# Patient Record
Sex: FEMALE | Race: WHITE | NOT HISPANIC OR LATINO | Employment: UNEMPLOYED | URBAN - METROPOLITAN AREA
[De-identification: names, ages, dates, MRNs, and addresses within clinical notes are randomized per-mention and may not be internally consistent; named-entity substitution may affect disease eponyms.]

---

## 2017-01-01 ENCOUNTER — APPOINTMENT (OUTPATIENT)
Dept: RADIOLOGY | Facility: MEDICAL CENTER | Age: 52
End: 2017-01-01
Attending: EMERGENCY MEDICINE
Payer: COMMERCIAL

## 2017-01-01 ENCOUNTER — HOSPITAL ENCOUNTER (EMERGENCY)
Facility: MEDICAL CENTER | Age: 52
End: 2017-01-01
Attending: EMERGENCY MEDICINE
Payer: COMMERCIAL

## 2017-01-01 VITALS
HEIGHT: 64 IN | HEART RATE: 101 BPM | RESPIRATION RATE: 18 BRPM | TEMPERATURE: 97.3 F | DIASTOLIC BLOOD PRESSURE: 104 MMHG | WEIGHT: 215.39 LBS | SYSTOLIC BLOOD PRESSURE: 141 MMHG | OXYGEN SATURATION: 96 % | BODY MASS INDEX: 36.77 KG/M2

## 2017-01-01 DIAGNOSIS — K52.9 GASTROENTERITIS, ACUTE: ICD-10-CM

## 2017-01-01 LAB
ALBUMIN SERPL BCP-MCNC: 3.7 G/DL (ref 3.2–4.9)
ALBUMIN/GLOB SERPL: 1.1 G/DL
ALP SERPL-CCNC: 265 U/L (ref 30–99)
ALT SERPL-CCNC: 23 U/L (ref 2–50)
ANION GAP SERPL CALC-SCNC: 15 MMOL/L (ref 0–11.9)
AST SERPL-CCNC: 14 U/L (ref 12–45)
BASOPHILS # BLD AUTO: 0.3 % (ref 0–1.8)
BASOPHILS # BLD: 0.04 K/UL (ref 0–0.12)
BILIRUB SERPL-MCNC: 0.5 MG/DL (ref 0.1–1.5)
BUN SERPL-MCNC: 22 MG/DL (ref 8–22)
CALCIUM SERPL-MCNC: 9.6 MG/DL (ref 8.5–10.5)
CHLORIDE SERPL-SCNC: 101 MMOL/L (ref 96–112)
CO2 SERPL-SCNC: 18 MMOL/L (ref 20–33)
CREAT SERPL-MCNC: 0.95 MG/DL (ref 0.5–1.4)
EOSINOPHIL # BLD AUTO: 0.07 K/UL (ref 0–0.51)
EOSINOPHIL NFR BLD: 0.5 % (ref 0–6.9)
ERYTHROCYTE [DISTWIDTH] IN BLOOD BY AUTOMATED COUNT: 43 FL (ref 35.9–50)
GFR SERPL CREATININE-BSD FRML MDRD: >60 ML/MIN/1.73 M 2
GLOBULIN SER CALC-MCNC: 3.3 G/DL (ref 1.9–3.5)
GLUCOSE SERPL-MCNC: 127 MG/DL (ref 65–99)
HCT VFR BLD AUTO: 41.2 % (ref 37–47)
HGB BLD-MCNC: 13.4 G/DL (ref 12–16)
IMM GRANULOCYTES # BLD AUTO: 0.06 K/UL (ref 0–0.11)
IMM GRANULOCYTES NFR BLD AUTO: 0.5 % (ref 0–0.9)
LACTATE BLD-SCNC: 1.5 MMOL/L (ref 0.5–2)
LACTATE BLD-SCNC: 3 MMOL/L (ref 0.5–2)
LYMPHOCYTES # BLD AUTO: 1.48 K/UL (ref 1–4.8)
LYMPHOCYTES NFR BLD: 11.1 % (ref 22–41)
MCH RBC QN AUTO: 28.8 PG (ref 27–33)
MCHC RBC AUTO-ENTMCNC: 32.5 G/DL (ref 33.6–35)
MCV RBC AUTO: 88.6 FL (ref 81.4–97.8)
MONOCYTES # BLD AUTO: 0.63 K/UL (ref 0–0.85)
MONOCYTES NFR BLD AUTO: 4.7 % (ref 0–13.4)
NEUTROPHILS # BLD AUTO: 11.03 K/UL (ref 2–7.15)
NEUTROPHILS NFR BLD: 82.9 % (ref 44–72)
NRBC # BLD AUTO: 0 K/UL
NRBC BLD AUTO-RTO: 0 /100 WBC
PLATELET # BLD AUTO: 568 K/UL (ref 164–446)
PMV BLD AUTO: 9 FL (ref 9–12.9)
POTASSIUM SERPL-SCNC: 4.1 MMOL/L (ref 3.6–5.5)
PROT SERPL-MCNC: 7 G/DL (ref 6–8.2)
RBC # BLD AUTO: 4.65 M/UL (ref 4.2–5.4)
SODIUM SERPL-SCNC: 134 MMOL/L (ref 135–145)
WBC # BLD AUTO: 13.3 K/UL (ref 4.8–10.8)

## 2017-01-01 PROCEDURE — 71010 DX-CHEST-PORTABLE (1 VIEW): CPT

## 2017-01-01 PROCEDURE — 99285 EMERGENCY DEPT VISIT HI MDM: CPT

## 2017-01-01 PROCEDURE — 96361 HYDRATE IV INFUSION ADD-ON: CPT

## 2017-01-01 PROCEDURE — 96374 THER/PROPH/DIAG INJ IV PUSH: CPT

## 2017-01-01 PROCEDURE — 80053 COMPREHEN METABOLIC PANEL: CPT

## 2017-01-01 PROCEDURE — 85025 COMPLETE CBC W/AUTO DIFF WBC: CPT

## 2017-01-01 PROCEDURE — 700111 HCHG RX REV CODE 636 W/ 250 OVERRIDE (IP): Performed by: EMERGENCY MEDICINE

## 2017-01-01 PROCEDURE — 83605 ASSAY OF LACTIC ACID: CPT | Mod: 91

## 2017-01-01 PROCEDURE — 96376 TX/PRO/DX INJ SAME DRUG ADON: CPT

## 2017-01-01 PROCEDURE — 74177 CT ABD & PELVIS W/CONTRAST: CPT

## 2017-01-01 PROCEDURE — 96375 TX/PRO/DX INJ NEW DRUG ADDON: CPT

## 2017-01-01 PROCEDURE — 87040 BLOOD CULTURE FOR BACTERIA: CPT

## 2017-01-01 RX ORDER — MORPHINE SULFATE 4 MG/ML
4 INJECTION, SOLUTION INTRAMUSCULAR; INTRAVENOUS ONCE
Status: COMPLETED | OUTPATIENT
Start: 2017-01-01 | End: 2017-01-01

## 2017-01-01 RX ORDER — SODIUM CHLORIDE 9 MG/ML
30 INJECTION, SOLUTION INTRAVENOUS
Status: COMPLETED | OUTPATIENT
Start: 2017-01-01 | End: 2017-01-01

## 2017-01-01 RX ORDER — DICYCLOMINE HCL 20 MG
20 TABLET ORAL EVERY 6 HOURS
Qty: 20 TAB | Refills: 0 | Status: SHIPPED | OUTPATIENT
Start: 2017-01-01 | End: 2017-07-28

## 2017-01-01 RX ORDER — ONDANSETRON 2 MG/ML
4 INJECTION INTRAMUSCULAR; INTRAVENOUS ONCE
Status: COMPLETED | OUTPATIENT
Start: 2017-01-01 | End: 2017-01-01

## 2017-01-01 RX ORDER — DIPHENOXYLATE HYDROCHLORIDE AND ATROPINE SULFATE 2.5; .025 MG/1; MG/1
1 TABLET ORAL 4 TIMES DAILY PRN
Qty: 30 TAB | Refills: 0 | Status: SHIPPED | OUTPATIENT
Start: 2017-01-01 | End: 2017-07-28

## 2017-01-01 RX ORDER — ONDANSETRON 4 MG/1
4 TABLET, ORALLY DISINTEGRATING ORAL EVERY 8 HOURS PRN
Qty: 15 TAB | Refills: 0 | Status: SHIPPED | OUTPATIENT
Start: 2017-01-01 | End: 2017-07-28

## 2017-01-01 RX ADMIN — MORPHINE SULFATE 4 MG: 4 INJECTION INTRAVENOUS at 13:47

## 2017-01-01 RX ADMIN — MORPHINE SULFATE 4 MG: 4 INJECTION INTRAVENOUS at 15:14

## 2017-01-01 RX ADMIN — SODIUM CHLORIDE 2931 ML: 9 INJECTION, SOLUTION INTRAVENOUS at 13:43

## 2017-01-01 RX ADMIN — ONDANSETRON 4 MG: 2 INJECTION, SOLUTION INTRAMUSCULAR; INTRAVENOUS at 13:47

## 2017-01-01 ASSESSMENT — LIFESTYLE VARIABLES: DO YOU DRINK ALCOHOL: NO

## 2017-01-01 ASSESSMENT — PAIN SCALES - GENERAL: PAINLEVEL_OUTOF10: 6

## 2017-01-01 NOTE — ED AVS SNAPSHOT
1/1/2017          Luann Morales  [address]  [facility] [address]    Dear Luann:    [facility] wants to ensure your discharge home is safe and you or your loved ones have had all your questions answered regarding your care after you leave the hospital.    You may receive a telephone call within two days of your discharge.  This call is to make certain you understand your discharge instructions as well as ensure we provided you with the best care possible during your stay with us.     The call will only last approximately 3-5 minutes and will be done by a nurse.    Once again, we want to ensure your discharge home is safe and that you have a clear understanding of any next steps in your care.  If you have any questions or concerns, please do not hesitate to contact us, we are here for you.  Thank you for choosing [facility] for your healthcare needs.    Sincerely,    Sergio Pinon    [facility]

## 2017-01-01 NOTE — ED PROVIDER NOTES
ED Provider Note    Scribed for Elio Mix M.D. by Pushpa Merritt. 1/1/2017  1:27 PM    Primary care provider: Lashon Ponce D.O.  Means of arrival: Walk-in  History obtained from: Patient  History limited by: None    CHIEF COMPLAINT  Chief Complaint   Patient presents with   • Nausea/Vomiting/Diarrhea       HPI  Luann Morales is a 51 y.o. female who presents to the Emergency Department with nausea, vomiting, diarrhea, and onset yesterday. The patient is 2 weeks status post breath reduction surgery and reports of diffuse abdominal pain since then. She began developing nausea, vomiting, and copious amounts of diarrhea yesterday with last emesis at 6:30 AM this morning with continued dry-heaves. She denies any hematochezia or shortness of breath. The patient states she has been around her daughter in law who is also sick with nausea and vomiting, but not as severe. She has past history of abdominal surgery cholecystectomy. She has an extensive list of allergies to medications. The patient does report recent antibiotic use and finished them on [place] Day.     REVIEW OF SYSTEMS  Pertinent negatives include no hematochezia or shortness of breath. As above, all other systems reviewed and are negative.   See HPI for further details.     PAST MEDICAL HISTORY   has a past medical history of Indigestion; Unspecified disorder of thyroid (1999); Heart burn; Arthritis; Cancer (HCC) (1990's); Essential hypertension; Hypothyroidism; Large breasts; and Perimenopausal.    SURGICAL HISTORY   has past surgical history that includes lumbar fusion posterior (9/2001); thyroidectomy total (2000); brody by laparoscopy (2005); wrist exploration (1998); primary c section (1987, 1995); egd w/endoscopic ultrasound (11/24/2009); hysteroscopy with video operative (4/11/2014); dilation and curettage (4/11/2014); and breast reconstruction.    SOCIAL HISTORY  Social History   Substance Use Topics   • Smoking status: Former Smoker -- 0.50  "packs/day for 5 years     Types: Cigarettes     Quit date: 01/01/1996   • Smokeless tobacco: Never Used      Comment: 1995   • Alcohol Use: Yes      Comment: 2 drink per week      History   Drug Use No       FAMILY HISTORY  Family History   Problem Relation Age of Onset   • Hypertension Mother    • Heart Attack Father        CURRENT MEDICATIONS  Home Medications     Reviewed by Humera Moss R.N. (Registered Nurse) on 01/01/17 at 1307  Med List Status: Complete    Medication Last Dose Status    albuterol 108 (90 BASE) MCG/ACT Aero Soln inhalation aerosol  Active    Cholecalciferol (VITAMIN D3) 2000 UNITS Tab  Active    levothyroxine (SYNTHROID) 125 MCG TABS  Active    lisinopril (PRINIVIL, ZESTRIL) 40 MG tablet  Active    omeprazole (PRILOSEC) 20 MG CPDR  Active                ALLERGIES  Allergies   Allergen Reactions   • Bactrim [Sulfamethoxazole W-Trimethoprim]    • Diclofenac Hives and Itching   • Doxycycline Hives   • Hydrocodone Hives   • Sulfamethizole Hives   • Tetracycline Hives   • Tramadol Hives   • Zpack [Azithromycin] Hives       PHYSICAL EXAM  VITAL SIGNS: /104 mmHg  Pulse 125  Temp(Src) 36.3 °C (97.3 °F) (Temporal)  Resp 24  Ht 1.626 m (5' 4\")  Wt 97.7 kg (215 lb 6.2 oz)  BMI 36.95 kg/m2  SpO2 100%  LMP 03/30/2014    Constitutional: Well developed, Well nourished, No acute distress, Non-toxic appearance.   HENT: Normocephalic, Atraumatic, Bilateral external ears normal, Oropharynx is clear mucous membranes are moist. No oral exudates or nasal discharge.   Eyes: Pupils are equal round and reactive, EOMI, Conjunctiva normal, No discharge.   Neck: Normal range of motion, No tenderness, Supple, No stridor. No meningismus.  Lymphatic: No lymphadenopathy noted.   Cardiovascular: Tachycardic, Regular rhythm without murmur rub or gallop.  Thorax & Lungs: Clear breath sounds bilaterally without wheezes, rhonchi or rales. There is no chest wall tenderness.   Abdomen: Diffuse 4 quadrant " tenderness, Soft non-distended. There is no rebound or guarding. No organomegaly is appreciated. Bowel sounds are normal.  Skin: Normal without rash.   Back: No CVA or spinal tenderness.   Extremities: Intact distal pulses, No edema, No tenderness, No cyanosis, No clubbing. Capillary refill is less than 2 seconds.  Musculoskeletal: Good range of motion in all major joints. No tenderness to palpation or major deformities noted.   Neurologic: Alert & oriented x 3, Normal motor function, Normal sensory function, No focal deficits noted. Reflexes are normal.  Psychiatric: Affect normal, Judgment normal, Mood normal. There is no suicidal ideation or patient reported hallucinations.     DIAGNOSTIC STUDIES / PROCEDURES    LABS  Labs Reviewed   LACTIC ACID - Abnormal; Notable for the following:     Lactic Acid 3.0 (*)     All other components within normal limits   CBC WITH DIFFERENTIAL - Abnormal; Notable for the following:     WBC 13.3 (*)     MCHC 32.5 (*)     Platelet Count 568 (*)     Neutrophils-Polys 82.90 (*)     Lymphocytes 11.10 (*)     Neutrophils (Absolute) 11.03 (*)     All other components within normal limits   COMP METABOLIC PANEL - Abnormal; Notable for the following:     Sodium 134 (*)     Co2 18 (*)     Anion Gap 15.0 (*)     Glucose 127 (*)     Alkaline Phosphatase 265 (*)     All other components within normal limits   LACTIC ACID   ESTIMATED GFR   URINALYSIS   URINE CULTURE(NEW)   BLOOD CULTURE   BLOOD CULTURE   All labs reviewed by me.    RADIOLOGY  CT-ABDOMEN-PELVIS WITH   Final Result      1.  There is bowel wall thickening with edema involving a large portion of the small bowel with normal appearing terminal ileum and duodenum. These findings may represent gastroenteritis. There is no bowel obstruction.   2.  There is no free air.   3.  There is a small amount of free fluid in the abdomen and in the pelvis.   4.  Surgically absent gallbladder without biliary dilatation.   5.  Left renal cortical  cyst.   6.  Cyst in the right lobe of the liver.   7.  There is postoperative change involving the right breast and axilla.      DX-CHEST-PORTABLE (1 VIEW)   Final Result      1.  There is no acute cardiopulmonary process.      The radiologist's interpretation of all radiological studies have been reviewed by me.    COURSE & MEDICAL DECISION MAKING  Nursing notes, VS, PMSFHx reviewed in chart.    1:27 PM Patient seen and examined at bedside. Ordered for DX-chest and labs to evaluate. Patient was treated with normal saline 2.931L infusion, morphine 4mg IV and Zofran 4mg IM for her symptoms.     2:57 PM Reviewed the patient's lab and imaging results, which are noted above. The patient was quite dehydrated and laboratory evaluation reveals a bicarb low at 18 with an anion gap of 15 and an initial lactate of 3.0. She was given significant fluid administration    3:02 PM Patient was reevaluated at bedside. The patient states her abdominal pain has not improved, but she has not experienced any emesis since arrival. Upon reassessment, the patient's physical exam is the same as before. Discussed lab and radiology results with the patient and informed them of the results, which are noted above. The patient does report recent antibiotic use and finished them on [place] Day. The patient will be medicated with another dose of morphine. Ordered CT-abdomen,pelvis with.     This shows bowel wall thickening with edema involving a large portion of the small bowel which is likely gastroenteritis. No evidence of free air, significant fluid collection, surgical process. The patient's white blood cell counts elevated at 13,300 with 83% neutrophil shift and she has significant elevation of platelet count at 568 but no evidence of clot    4:07 PM Reviewed CT result, which is noted above. I evaluated the patient at bedside and discussed the CT results with her. She is feeling better after her 2nd dose of morphine. If the patient is able  to tolerate PO challenge, then she is stable to be discharged home with Bentyl 20mg PO, Zofran 4mg ODT, and Limotil 2.5-0.025mg PO.    I have signed into and reviewed the patient's prescription monitoring program data prior to prescribing a scheduled drug. The patient will return for new or worsening symptoms and is stable at the time of discharge.    DISPOSITION:  Patient will be discharged home in stable condition.    FINAL IMPRESSION  1. Gastroenteritis, acute       IPushpa (Bonifacioibesperanza), am scribing for, and in the presence of, Elio Mix M.D..    Electronically signed by: Pushpa Merritt (Mira), 1/1/2017    Elio RUSSELL M.D. personally performed the services described in this documentation, as scribed by Pushpa Merritt in my presence, and it is both accurate and complete.    The note accurately reflects work and decisions made by me.  Elio Mix  1/1/2017  4:10 PM

## 2017-01-01 NOTE — ED NOTES
Pt reports abdominal pain. Pt is 2 weeks s/p breast reduction surgery. Pt reports n/v/d x 48 hours. Pt appears to be in mild distress. Sepsis score 3. Charge RN aware.

## 2017-01-01 NOTE — ED NOTES
Pt c/o midline abdominal pain, n/v/d x 2 days. Pt states pain in abdomen is intermittent, sharp. Pt states she is unable to tolerate anything PO. Pt had breast reduction surgery on Dec. 19, 2016. Incision CDI, no evidence of infection noted. Pt denies fevers, urinary symptoms. States he daughter in law was sick with same symptoms recently but not as bad. A/o x4, speaking in full sentences.

## 2017-01-01 NOTE — ED AVS SNAPSHOT
Revee Access Code: [email or web address]  Expires: 1/27/2017  9:03 AM    Revee  A secure, online tool to manage your health information     [employer]’s Revee® is a secure, online tool that connects you to your personalized health information from the privacy of your home -- day or night - making it very easy for you to manage your healthcare. Once the activation process is completed, you can even access your medical information using the Revee elena, which is available for free in the Apple Elena store or Google Play store.     Revee provides the following levels of access (as shown below):   My Chart Features   [facility] Primary Care Doctor [facility]  Specialists [facility]  Urgent  Care Non-[facility]  Primary Care  Doctor   Email your healthcare team securely and privately 24/7 X X X X   Manage appointments: schedule your next appointment; view details of past/upcoming appointments X      Request prescription refills. X      View recent personal medical records, including lab and immunizations X X X X   View health record, including health history, allergies, medications X X X X   Read reports about your outpatient visits, procedures, consult and ER notes X X X X   See your discharge summary, which is a recap of your hospital and/or ER visit that includes your diagnosis, lab results, and care plan. X X       How to register for Revee:  1. Go to  [email or web address].  2. Click on the Sign Up Now box, which takes you to the New Member Sign Up page. You will need to provide the following information:  a. Enter your Revee Access Code exactly as it appears at the top of this page. (You will not need to use this code after you’ve completed the sign-up process. If you do not sign up before the expiration date, you must request a new code.)   b. Enter your date of birth.   c. Enter your home email address.   d. Click Submit, and follow the next screen’s instructions.  3. Create a Revee ID. This will be your Revee  login ID and cannot be changed, so think of one that is secure and easy to remember.  4. Create a [employer] password. You can change your password at any time.  5. Enter your Password Reset Question and Answer. This can be used at a later time if you forget your password.   6. Enter your e-mail address. This allows you to receive e-mail notifications when new information is available in No Chains.  7. Click Sign Up. You can now view your health information.    For assistance activating your [employer] Chains account, call [phone]

## 2017-01-02 NOTE — DISCHARGE INSTRUCTIONS
Viral Gastroenteritis  Viral gastroenteritis is also known as stomach flu. This condition affects the stomach and intestinal tract. It can cause sudden diarrhea and vomiting. The illness typically lasts 3 to 8 days. Most people develop an immune response that eventually gets rid of the virus. While this natural response develops, the virus can make you quite ill.  CAUSES   Many different viruses can cause gastroenteritis, such as rotavirus or noroviruses. You can catch one of these viruses by consuming contaminated food or water. You may also catch a virus by sharing utensils or other personal items with an infected person or by touching a contaminated surface.  SYMPTOMS   The most common symptoms are diarrhea and vomiting. These problems can cause a severe loss of body fluids (dehydration) and a body salt (electrolyte) imbalance. Other symptoms may include:  · Fever.  · Headache.  · Fatigue.  · Abdominal pain.  DIAGNOSIS   Your caregiver can usually diagnose viral gastroenteritis based on your symptoms and a physical exam. A stool sample may also be taken to test for the presence of viruses or other infections.  TREATMENT   This illness typically goes away on its own. Treatments are aimed at rehydration. The most serious cases of viral gastroenteritis involve vomiting so severely that you are not able to keep fluids down. In these cases, fluids must be given through an intravenous line (IV).  HOME CARE INSTRUCTIONS   · Drink enough fluids to keep your urine clear or pale yellow. Drink small amounts of fluids frequently and increase the amounts as tolerated.  · Ask your caregiver for specific rehydration instructions.  · Avoid:  ¨ Foods high in sugar.  ¨ Alcohol.  ¨ Carbonated drinks.  ¨ Tobacco.  ¨ Juice.  ¨ Caffeine drinks.  ¨ Extremely hot or cold fluids.  ¨ Fatty, greasy foods.  ¨ Too much intake of anything at one time.  ¨ Dairy products until 24 to 48 hours after diarrhea stops.  · You may consume probiotics.  Probiotics are active cultures of beneficial bacteria. They may lessen the amount and number of diarrheal stools in adults. Probiotics can be found in yogurt with active cultures and in supplements.  · Wash your hands well to avoid spreading the virus.  · Only take over-the-counter or prescription medicines for pain, discomfort, or fever as directed by your caregiver. Do not give aspirin to children. Antidiarrheal medicines are not recommended.  · Ask your caregiver if you should continue to take your regular prescribed and over-the-counter medicines.  · Keep all follow-up appointments as directed by your caregiver.  SEEK IMMEDIATE MEDICAL CARE IF:   · You are unable to keep fluids down.  · You do not urinate at least once every 6 to 8 hours.  · You develop shortness of breath.  · You notice blood in your stool or vomit. This may look like coffee grounds.  · You have abdominal pain that increases or is concentrated in one small area (localized).  · You have persistent vomiting or diarrhea.  · You have a fever.  · The patient is a child younger than 3 months, and he or she has a fever.  · The patient is a child older than 3 months, and he or she has a fever and persistent symptoms.  · The patient is a child older than 3 months, and he or she has a fever and symptoms suddenly get worse.  · The patient is a baby, and he or she has no tears when crying.  MAKE SURE YOU:   · Understand these instructions.  · Will watch your condition.  · Will get help right away if you are not doing well or get worse.     This information is not intended to replace advice given to you by your health care provider. Make sure you discuss any questions you have with your health care provider.     Document Released: 12/18/2006 Document Revised: 03/11/2013 Document Reviewed: 10/03/2012  [employer] Interactive Patient Education ©2016 [employer]

## 2017-01-02 NOTE — ED NOTES
MD at bedside prior to d/c. Pt given d/c instruction. 3 rx's given. Pt to lobby by wheelchair. Pt took all belongings from room.

## 2017-01-06 LAB
BACTERIA BLD CULT: NORMAL
BACTERIA BLD CULT: NORMAL
SIGNIFICANT IND 70042: NORMAL
SIGNIFICANT IND 70042: NORMAL
SITE SITE: NORMAL
SITE SITE: NORMAL
SOURCE SOURCE: NORMAL
SOURCE SOURCE: NORMAL

## 2017-07-05 ENCOUNTER — HOSPITAL ENCOUNTER (OUTPATIENT)
Dept: RADIOLOGY | Facility: MEDICAL CENTER | Age: 52
End: 2017-07-05
Attending: SURGERY
Payer: COMMERCIAL

## 2017-07-05 DIAGNOSIS — M25.532 LEFT WRIST PAIN: ICD-10-CM

## 2017-07-05 DIAGNOSIS — S63.592D OTHER SPECIFIED SPRAIN OF LEFT WRIST, SUBSEQUENT ENCOUNTER: ICD-10-CM

## 2017-07-05 PROCEDURE — 73221 MRI JOINT UPR EXTREM W/O DYE: CPT | Mod: LT

## 2017-07-28 DIAGNOSIS — Z01.812 PRE-OPERATIVE LABORATORY EXAMINATION: ICD-10-CM

## 2017-07-28 DIAGNOSIS — Z01.810 PRE-OPERATIVE CARDIOVASCULAR EXAMINATION: ICD-10-CM

## 2017-07-28 LAB
ANION GAP SERPL CALC-SCNC: 7 MMOL/L (ref 0–11.9)
BUN SERPL-MCNC: 15 MG/DL (ref 8–22)
CALCIUM SERPL-MCNC: 9.4 MG/DL (ref 8.5–10.5)
CHLORIDE SERPL-SCNC: 104 MMOL/L (ref 96–112)
CO2 SERPL-SCNC: 24 MMOL/L (ref 20–33)
CREAT SERPL-MCNC: 1.01 MG/DL (ref 0.5–1.4)
EKG IMPRESSION: NORMAL
GFR SERPL CREATININE-BSD FRML MDRD: 57 ML/MIN/1.73 M 2
GLUCOSE SERPL-MCNC: 78 MG/DL (ref 65–99)
POTASSIUM SERPL-SCNC: 4 MMOL/L (ref 3.6–5.5)
SODIUM SERPL-SCNC: 135 MMOL/L (ref 135–145)

## 2017-07-28 PROCEDURE — 93005 ELECTROCARDIOGRAM TRACING: CPT

## 2017-07-28 PROCEDURE — 93010 ELECTROCARDIOGRAM REPORT: CPT | Performed by: INTERNAL MEDICINE

## 2017-07-28 PROCEDURE — 80048 BASIC METABOLIC PNL TOTAL CA: CPT

## 2017-07-28 PROCEDURE — 36415 COLL VENOUS BLD VENIPUNCTURE: CPT

## 2017-08-02 ENCOUNTER — HOSPITAL ENCOUNTER (OUTPATIENT)
Facility: MEDICAL CENTER | Age: 52
End: 2017-08-02
Attending: SURGERY | Admitting: SURGERY
Payer: COMMERCIAL

## 2017-08-02 VITALS
BODY MASS INDEX: 40.8 KG/M2 | RESPIRATION RATE: 17 BRPM | TEMPERATURE: 97.2 F | OXYGEN SATURATION: 90 % | HEART RATE: 72 BPM | HEIGHT: 64 IN | WEIGHT: 238.98 LBS

## 2017-08-02 PROBLEM — M25.532 LEFT WRIST PAIN: Status: ACTIVE | Noted: 2017-08-02

## 2017-08-02 PROBLEM — S63.599A: Status: ACTIVE | Noted: 2017-08-02

## 2017-08-02 PROCEDURE — 500312 HCHG CONNECTOR, BLAKE DRAIN 1-WAY: Performed by: SURGERY

## 2017-08-02 PROCEDURE — 160047 HCHG PACU  - EA ADDL 30 MINS PHASE II: Performed by: SURGERY

## 2017-08-02 PROCEDURE — 160041 HCHG SURGERY MINUTES - EA ADDL 1 MIN LEVEL 4: Performed by: SURGERY

## 2017-08-02 PROCEDURE — 160046 HCHG PACU - 1ST 60 MINS PHASE II: Performed by: SURGERY

## 2017-08-02 PROCEDURE — 160025 RECOVERY II MINUTES (STATS): Performed by: SURGERY

## 2017-08-02 PROCEDURE — 700102 HCHG RX REV CODE 250 W/ 637 OVERRIDE(OP)

## 2017-08-02 PROCEDURE — A9270 NON-COVERED ITEM OR SERVICE: HCPCS

## 2017-08-02 PROCEDURE — 160048 HCHG OR STATISTICAL LEVEL 1-5: Performed by: SURGERY

## 2017-08-02 PROCEDURE — 160036 HCHG PACU - EA ADDL 30 MINS PHASE I: Performed by: SURGERY

## 2017-08-02 PROCEDURE — 700111 HCHG RX REV CODE 636 W/ 250 OVERRIDE (IP)

## 2017-08-02 PROCEDURE — 700101 HCHG RX REV CODE 250

## 2017-08-02 PROCEDURE — 160002 HCHG RECOVERY MINUTES (STAT): Performed by: SURGERY

## 2017-08-02 PROCEDURE — 501838 HCHG SUTURE GENERAL: Performed by: SURGERY

## 2017-08-02 PROCEDURE — 160029 HCHG SURGERY MINUTES - 1ST 30 MINS LEVEL 4: Performed by: SURGERY

## 2017-08-02 PROCEDURE — 160035 HCHG PACU - 1ST 60 MINS PHASE I: Performed by: SURGERY

## 2017-08-02 PROCEDURE — 160009 HCHG ANES TIME/MIN: Performed by: SURGERY

## 2017-08-02 PROCEDURE — 501480 HCHG STOCKINETTE: Performed by: SURGERY

## 2017-08-02 PROCEDURE — 501336 HCHG SHAVER: Performed by: SURGERY

## 2017-08-02 PROCEDURE — 500881 HCHG PACK, EXTREMITY: Performed by: SURGERY

## 2017-08-02 PROCEDURE — 502240 HCHG MISC OR SUPPLY RC 0272: Performed by: SURGERY

## 2017-08-02 RX ORDER — HYDROMORPHONE HYDROCHLORIDE 2 MG/ML
INJECTION, SOLUTION INTRAMUSCULAR; INTRAVENOUS; SUBCUTANEOUS
Status: COMPLETED
Start: 2017-08-02 | End: 2017-08-02

## 2017-08-02 RX ORDER — OXYCODONE HYDROCHLORIDE 5 MG/1
5 TABLET ORAL EVERY 4 HOURS PRN
Qty: 30 TAB | Refills: 0 | Status: SHIPPED | OUTPATIENT
Start: 2017-08-02 | End: 2018-02-14

## 2017-08-02 RX ORDER — MAGNESIUM HYDROXIDE 1200 MG/15ML
LIQUID ORAL
Status: DISCONTINUED | OUTPATIENT
Start: 2017-08-02 | End: 2017-08-02 | Stop reason: HOSPADM

## 2017-08-02 RX ORDER — SODIUM CHLORIDE, SODIUM LACTATE, POTASSIUM CHLORIDE, CALCIUM CHLORIDE 600; 310; 30; 20 MG/100ML; MG/100ML; MG/100ML; MG/100ML
INJECTION, SOLUTION INTRAVENOUS CONTINUOUS
Status: DISCONTINUED | OUTPATIENT
Start: 2017-08-02 | End: 2017-08-02 | Stop reason: HOSPADM

## 2017-08-02 RX ORDER — BUPIVACAINE HYDROCHLORIDE AND EPINEPHRINE 5; 5 MG/ML; UG/ML
INJECTION, SOLUTION PERINEURAL
Status: DISCONTINUED | OUTPATIENT
Start: 2017-08-02 | End: 2017-08-02 | Stop reason: HOSPADM

## 2017-08-02 RX ORDER — HALOPERIDOL 5 MG/ML
INJECTION INTRAMUSCULAR
Status: COMPLETED
Start: 2017-08-02 | End: 2017-08-02

## 2017-08-02 RX ORDER — DIPHENHYDRAMINE HYDROCHLORIDE 50 MG/ML
INJECTION INTRAMUSCULAR; INTRAVENOUS
Status: COMPLETED
Start: 2017-08-02 | End: 2017-08-02

## 2017-08-02 RX ORDER — OXYCODONE HCL 5 MG/5 ML
SOLUTION, ORAL ORAL
Status: COMPLETED
Start: 2017-08-02 | End: 2017-08-02

## 2017-08-02 RX ORDER — ACETAMINOPHEN 500 MG
1500 TABLET ORAL
COMMUNITY
End: 2018-02-14

## 2017-08-02 RX ADMIN — FENTANYL CITRATE 50 MCG: 50 INJECTION, SOLUTION INTRAMUSCULAR; INTRAVENOUS at 11:45

## 2017-08-02 RX ADMIN — HYDROMORPHONE HYDROCHLORIDE 0.5 MG: 2 INJECTION INTRAMUSCULAR; INTRAVENOUS; SUBCUTANEOUS at 09:35

## 2017-08-02 RX ADMIN — HYDROMORPHONE HYDROCHLORIDE 0.5 MG: 2 INJECTION INTRAMUSCULAR; INTRAVENOUS; SUBCUTANEOUS at 09:45

## 2017-08-02 RX ADMIN — OXYCODONE HYDROCHLORIDE 10 MG: 5 SOLUTION ORAL at 09:22

## 2017-08-02 RX ADMIN — HYDROMORPHONE HYDROCHLORIDE 0.5 MG: 2 INJECTION INTRAMUSCULAR; INTRAVENOUS; SUBCUTANEOUS at 09:30

## 2017-08-02 RX ADMIN — SODIUM CHLORIDE, SODIUM LACTATE, POTASSIUM CHLORIDE, CALCIUM CHLORIDE 1000 ML: 600; 310; 30; 20 INJECTION, SOLUTION INTRAVENOUS at 07:30

## 2017-08-02 RX ADMIN — HALOPERIDOL LACTATE 1 MG: 5 INJECTION, SOLUTION INTRAMUSCULAR at 11:45

## 2017-08-02 RX ADMIN — HYDROMORPHONE HYDROCHLORIDE 0.5 MG: 2 INJECTION INTRAMUSCULAR; INTRAVENOUS; SUBCUTANEOUS at 09:40

## 2017-08-02 RX ADMIN — FENTANYL CITRATE 50 MCG: 50 INJECTION, SOLUTION INTRAMUSCULAR; INTRAVENOUS at 09:22

## 2017-08-02 RX ADMIN — DIPHENHYDRAMINE HYDROCHLORIDE 12.5 MG: 50 INJECTION INTRAMUSCULAR; INTRAVENOUS at 11:30

## 2017-08-02 ASSESSMENT — PAIN SCALES - GENERAL
PAINLEVEL_OUTOF10: 4
PAINLEVEL_OUTOF10: 6
PAINLEVEL_OUTOF10: 4
PAINLEVEL_OUTOF10: 10
PAINLEVEL_OUTOF10: 3
PAINLEVEL_OUTOF10: 3
PAINLEVEL_OUTOF10: 4
PAINLEVEL_OUTOF10: 3
PAINLEVEL_OUTOF10: 8
PAINLEVEL_OUTOF10: 4
PAINLEVEL_OUTOF10: 3
PAINLEVEL_OUTOF10: 3
PAINLEVEL_OUTOF10: 4
PAINLEVEL_OUTOF10: 5
PAINLEVEL_OUTOF10: 3
PAINLEVEL_OUTOF10: 5
PAINLEVEL_OUTOF10: 4
PAINLEVEL_OUTOF10: 3

## 2017-08-02 NOTE — OR SURGEON
Operative Report    PreOp Diagnosis: left wrist TFCC tear and ulnotriquetral ligament tear    PostOp Diagnosis: same    Procedure(s):  WRIST ARTHROSCOPY TRIANGULAR FIBROCARTILAGE COMPLEX DEBRIDEMENT - Wound Class: Clean  LIGAMENT REPAIR ULNOTRIQUETRAL  - Wound Class: Clean    Surgeon(s):  Davion Grey M.D.    Anesthesiologist/Type of Anesthesia:  Anesthesiologist: Liss Abbasi M.D./General    Surgical Staff:  Assistant: Ronal Wilson PA-C  Circulator: Eli Claire R.N.  Relief Circulator: Bowen Pagan R.N.  Scrub Person: Ilda Kramer    Specimens:  * No specimens in log *    Estimated Blood Loss: minimal    Findings: see dictation    Complications: none noted.         8/2/2017 9:12 AM Davion Grey

## 2017-08-02 NOTE — DISCHARGE INSTRUCTIONS
ACTIVITY: Rest and take it easy for the first 24 hours.  A responsible adult is recommended to remain with you during that time.  It is normal to feel sleepy.  We encourage you to not do anything that requires balance, judgment or coordination.    MILD FLU-LIKE SYMPTOMS ARE NORMAL. YOU MAY EXPERIENCE GENERALIZED MUSCLE ACHES, THROAT IRRITATION, HEADACHE AND/OR SOME NAUSEA.    FOR 24 HOURS DO NOT:  Drive, operate machinery or run household appliances.  Drink beer or alcoholic beverages.   Make important decisions or sign legal documents.    SPECIAL INSTRUCTIONS:   Keep splint on, clean, and dry until clinic follow-up.   Elevate above heart and ice frequently for at least 2 weeks.   No heavy lifting or grasping for at least 6 weeks.   No driving while on narcotic pain medications.   Contact auto-insurance carrier for clearance to begin driving again.   Call MD or come to ER for any major concerns.     .    DIET: To avoid nausea, slowly advance diet as tolerated, avoiding spicy or greasy foods for the first day.  Add more substantial food to your diet according to your physician's instructions. INCREASE FLUIDS AND FIBER TO AVOID CONSTIPATION.      FOLLOW-UP APPOINTMENT:  A follow-up appointment should be arranged with your doctor in 1-2 weeks; call to schedule.    You should CALL YOUR PHYSICIAN if you develop:  Fever greater than 101 degrees F.  Pain not relieved by medication, or persistent nausea or vomiting.  Excessive bleeding (blood soaking through dressing) or unexpected drainage from the wound.  Extreme redness or swelling around the incision site, drainage of pus or foul smelling drainage.  Inability to urinate or empty your bladder within 8 hours.  Problems with breathing or chest pain.    You should call 911 if you develop problems with breathing or chest pain.  If you are unable to contact your doctor or surgical center, you should go to the nearest emergency room or urgent care center.  Physician's  telephone #: [phone]    If any questions arise, call your doctor.  If your doctor is not available, please feel free to call the Surgical Center at [phone].  The Center is open Monday through Friday from 7AM to 7PM.  You can also call the HEALTH HOTLINE open 24 hours/day, 7 days/week and speak to a nurse at [phone], or toll free at [phone].    A registered nurse may call you a few days after your surgery to see how you are doing after your procedure.    MEDICATIONS: Resume taking daily medication.  Take prescribed pain medication with food.  If no medication is prescribed, you may take non-aspirin pain medication if needed.  PAIN MEDICATION CAN BE VERY CONSTIPATING.  Take a stool softener or laxative such as senokot, pericolace, or milk of magnesia if needed.    Prescription given for Roxicodone.  Last pain medication given at 9:22 am.    If your physician has prescribed pain medication that includes Acetaminophen (Tylenol), do not take additional Acetaminophen (Tylenol) while taking the prescribed medication.    Depression / Suicide Risk    As you are discharged from this [facility] facility, it is important to learn how to keep safe from harming yourself.    Recognize the warning signs:  · Abrupt changes in personality, positive or negative- including increase in energy   · Giving away possessions  · Change in eating patterns- significant weight changes-  positive or negative  · Change in sleeping patterns- unable to sleep or sleeping all the time   · Unwillingness or inability to communicate  · Depression  · Unusual sadness, discouragement and loneliness  · Talk of wanting to die  · Neglect of personal appearance   · Rebelliousness- reckless behavior  · Withdrawal from people/activities they love  · Confusion- inability to concentrate     If you or a loved one observes any of these behaviors or has concerns about self-harm, here's what you can do:  · Talk about it- your feelings and  reasons for harming yourself  · Remove any means that you might use to hurt yourself (examples: pills, rope, extension cords, firearm)  · Get professional help from the community (Mental Health, Substance Abuse, psychological counseling)  · Do not be alone:Call your Safe Contact- someone whom you trust who will be there for you.  · Call your local CRISIS HOTLINE 007-3963 or 678-814-7645  · Call your local Children's Mobile Crisis Response Team [place] (620) 283-8279 or www.[employer]  · Call the toll free National Suicide Prevention Hotlines   · National Suicide Prevention Lifeline 976-924-ANYR (7089)  · National Hope Line Network 800-SUICIDE (223-1427)

## 2017-08-02 NOTE — OP REPORT
DATE OF SERVICE:  08/02/2017    SURGEON:  Davion Grey MD    ASSISTANT:  Ronal Wilson PA-C    PREOPERATIVE DIAGNOSES:  1.  Left wrist triangular fibrocartilage complex tear.  2.  Left ulnotriquetral ligament tear.    POSTOPERATIVE DIAGNOSES:  1.  Left wrist triangular fibrocartilage complex tear.  2.  Left ulnotriquetral ligament tear.    PROCEDURES:  1.  Left wrist arthroscopic TFCC debridement.  2.  Left wrist open ulnotriquetral ligament repair.    ANESTHESIOLOGIST:  Liss Abbasi MD    ANESTHESIA:  General endotracheal anesthesia.    COMPLICATIONS:  None noted.    DRAINS:  None.    SPECIMENS:  None.    ESTIMATED BLOOD LOSS:  Minimal.    INDICATIONS:  The patient is a 51-year-old female well known to me through my   outpatient clinic for the above-mentioned diagnoses.  She believes and agrees   she has failed conservative treatment and is a candidate for the   above-mentioned procedures.  Prior to the procedure, she understood the risks,   benefits and alternatives to surgery.  She understood the risks to include,   but not limited to the risk of infection requiring repeat surgery, bleeding   requiring blood transfusion, nerve, blood vessel, and tendon injury requiring   repair, chronic pain, failure to alleviate her symptoms requiring revision   surgery, DVT, pulmonary embolism, heart attack, stroke, even death.  Patient   states despite these risks, she wished to proceed with surgery.    DESCRIPTION OF PROCEDURE:  Patient was met in the preoperative holding area.    Her left wrist was initialed as correct operative site.  She had an   opportunity to ask questions, all questions were answered and informed consent   was obtained.  The patient was brought to the operating room and laid supine   on the operating table.  All bony and dependent prominences were well padded.    General endotracheal anesthesia was induced without noted complication.    Ancef was administered for infection  prophylaxis.  Left upper extremity was   prepped and draped in usual sterile fashion.  A formal time-out was performed,   in which all parties agreed upon the correct patient, procedure, and   operative site.  I began the procedure by placing the patient's left wrist in   the traction tower, approximately 12 pounds of traction.  I then using Esmarch   to exsanguinate the extremity inflated the tourniquet to 250 mmHg.  I bluntly   established a 3-4 portal, performed diagnostic arthroscopy.  She had   well-preserved scaphoid, lunate and distal radius articular surfaces.  There   was some fraying of the membranous portion of the scapholunate ligament, but   the volar and dorsal portions were intact.  There was no evidence of   instability.  I visualized ulnarly.  There was a central TFCC tear with a flap   of TFCC that was unstable flipping back and forth with wrist motion.  I   bluntly established 4-5 portal, used a shaver to smoothly contour the central   TFCC tear to a smooth service without unstable flaps of cartilage, I have more   ulnar and volar.  I have visualized an ulnotriquetral ligament split tear   with significant amount of synovitis within.  I used the shaver to debride the   synovitis and then used the Meniscus Mender 2 set to pass in the interval   between ECU and FCU the cannulated needles and used a loop grasper to pass in   2-0 PDS suture on both sides of the ulnotriquetral ligament split tear.  I   then made an open incision on the ulnar side and the interval between ECU and   FCU, identified the superficial branch of the ulnar nerve.  I tied the suture   down well visualizing arthroscopically that close down the split in the   ulnotriquetral ligament.  I then deflated the tourniquet, used Bovie cautery   for hemostasis, copiously irrigated the wounds with normal saline and   infiltrated subcutaneous tissues with a total of 20 mL of 0.5% Marcaine with   epinephrine and closed the incision with  interrupted 4-0 nylon suture, covered   with sterile Xeroform, 4x4s, and a well-padded sugar-tong splint.  The   patient awoke from anesthesia without noted complication and was transferred   in stable condition to PACU where she had no immediate post-term complaints.    POSTOPERATIVE PLAN:  The patient will be discharged home per same day   criteria, sedentary use of the upper extremity and follow up in clinic in   10-14 days for suture removal and the cast.       ____________________________________     MD JOSELO Boykin / LEILANI    DD:  08/02/2017 09:27:40  DT:  08/02/2017 10:21:24    D#:  [id number]  Job#:  [id number]

## 2017-08-02 NOTE — IP AVS SNAPSHOT
" Home Care Instructions                                                                                                                Name:Luann Morales  Medical Record Number:[id number]  CSN: [id number]    YOB: 1965   Age: 51 y.o.  Sex: female  HT:1.626 m (5' 4\") WT: 108.4 kg (238 lb 15.7 oz)          Admit Date: 8/2/2017     Discharge Date:   Today's Date: 8/2/2017  Attending Doctor:  AASHISH Lin*                  Allergies:  Bactrim; Diclofenac; Doxycycline; Hydrocodone; Sulfamethizole; Tetracycline; Tramadol; and Zpack                Discharge Instructions           ACTIVITY: Rest and take it easy for the first 24 hours.  A responsible adult is recommended to remain with you during that time.  It is normal to feel sleepy.  We encourage you to not do anything that requires balance, judgment or coordination.    MILD FLU-LIKE SYMPTOMS ARE NORMAL. YOU MAY EXPERIENCE GENERALIZED MUSCLE ACHES, THROAT IRRITATION, HEADACHE AND/OR SOME NAUSEA.    FOR 24 HOURS DO NOT:  Drive, operate machinery or run household appliances.  Drink beer or alcoholic beverages.   Make important decisions or sign legal documents.    SPECIAL INSTRUCTIONS:   Keep splint on, clean, and dry until clinic follow-up.   Elevate above heart and ice frequently for at least 2 weeks.   No heavy lifting or grasping for at least 6 weeks.   No driving while on narcotic pain medications.   Contact auto-insurance carrier for clearance to begin driving again.   Call MD or come to ER for any major concerns.     .    DIET: To avoid nausea, slowly advance diet as tolerated, avoiding spicy or greasy foods for the first day.  Add more substantial food to your diet according to your physician's instructions. INCREASE FLUIDS AND FIBER TO AVOID CONSTIPATION.      FOLLOW-UP APPOINTMENT:  A follow-up appointment should be arranged with your doctor in 1-2 weeks; call to schedule.    You should CALL YOUR PHYSICIAN if you develop:  Fever greater " than 101 degrees F.  Pain not relieved by medication, or persistent nausea or vomiting.  Excessive bleeding (blood soaking through dressing) or unexpected drainage from the wound.  Extreme redness or swelling around the incision site, drainage of pus or foul smelling drainage.  Inability to urinate or empty your bladder within 8 hours.  Problems with breathing or chest pain.    You should call 911 if you develop problems with breathing or chest pain.  If you are unable to contact your doctor or surgical center, you should go to the nearest emergency room or urgent care center.  Physician's telephone #: [phone]    If any questions arise, call your doctor.  If your doctor is not available, please feel free to call the Surgical Center at [phone].  The Center is open Monday through Friday from 7AM to 7PM.  You can also call the [facility] HOTLINE open 24 hours/day, 7 days/week and speak to a nurse at [phone], or toll free at [phone].    A registered nurse may call you a few days after your surgery to see how you are doing after your procedure.    MEDICATIONS: Resume taking daily medication.  Take prescribed pain medication with food.  If no medication is prescribed, you may take non-aspirin pain medication if needed.  PAIN MEDICATION CAN BE VERY CONSTIPATING.  Take a stool softener or laxative such as senokot, pericolace, or milk of magnesia if needed.    Prescription given for Roxicodone.  Last pain medication given at 9:22 am.    If your physician has prescribed pain medication that includes Acetaminophen (Tylenol), do not take additional Acetaminophen (Tylenol) while taking the prescribed medication.    Depression / Suicide Risk    As you are discharged from this [facility] facility, it is important to learn how to keep safe from harming yourself.    Recognize the warning signs:  · Abrupt changes in personality, positive or negative- including increase in energy   · Giving away  possessions  · Change in eating patterns- significant weight changes-  positive or negative  · Change in sleeping patterns- unable to sleep or sleeping all the time   · Unwillingness or inability to communicate  · Depression  · Unusual sadness, discouragement and loneliness  · Talk of wanting to die  · Neglect of personal appearance   · Rebelliousness- reckless behavior  · Withdrawal from people/activities they love  · Confusion- inability to concentrate     If you or a loved one observes any of these behaviors or has concerns about self-harm, here's what you can do:  · Talk about it- your feelings and reasons for harming yourself  · Remove any means that you might use to hurt yourself (examples: pills, rope, extension cords, firearm)  · Get professional help from the community (Mental Health, Substance Abuse, psychological counseling)  · Do not be alone:Call your Safe Contact- someone whom you trust who will be there for you.  · Call your local CRISIS HOTLINE 435-2938 or 037-985-2380  · Call your local Children's Mobile Crisis Response Team [place] (219) 330-0572 or wwwDadShed  · Call the toll free National Suicide Prevention Hotlines   · National Suicide Prevention Lifeline 420-703-OHTW (7638)  · National Hope Line Network 800-SUICIDE (428-0406)       Medication List      START taking these medications        Instructions    Morning Afternoon Evening Bedtime    oxycodone immediate-release 5 MG Tabs   Commonly known as:  ROXICODONE        Take 1 Tab by mouth every four hours as needed for Severe Pain.   Dose:  5 mg                          CONTINUE taking these medications        Instructions    Morning Afternoon Evening Bedtime    acetaminophen 500 MG Tabs   Commonly known as:  TYLENOL        Take 1,500 mg by mouth 1 time daily as needed.   Dose:  1500 mg                        levothyroxine 125 MCG Tabs   Commonly known as:  SYNTHROID        Take 125 mcg by mouth Every morning on an empty stomach.    Dose:  125 mcg                        lisinopril 40 MG tablet   Commonly known as:  PRINIVIL, ZESTRIL        Take 40 mg by mouth every morning. Indications: High Blood Pressure   Dose:  40 mg                        MOVE FREE PO        Take 2 Tabs by mouth every morning.   Dose:  2 Tab                        omeprazole 20 MG delayed-release capsule   Commonly known as:  PRILOSEC        Take 20 mg by mouth every morning. Indications: Gastroesophageal Reflux Disease   Dose:  20 mg                             Where to Get Your Medications      You can get these medications from any pharmacy     Bring a paper prescription for each of these medications    - oxycodone immediate-release 5 MG Tabs            Medication Information     Above and/or attached are the medications your physician expects you to take upon discharge. Review all of your home medications and newly ordered medications with your doctor and/or pharmacist. Follow medication instructions as directed by your doctor and/or pharmacist. Please keep your medication list with you and share with your physician. Update the information when medications are discontinued, doses are changed, or new medications (including over-the-counter products) are added; and carry medication information at all times in the event of emergency situations.        Resources     Quit Smoking / Tobacco Use:    I understand the use of any tobacco products increases my chance of suffering from future heart disease or stroke and could cause other illnesses which may shorten my life. Quitting the use of tobacco products is the single most important thing I can do to improve my health. For further information on smoking / tobacco cessation call a Toll Free Quit Line at 1-521.793.2552 (*[employer]) or 1-206.913.7644 (American Lung Association) or you can access the web based program at www.lungusa.org.    [place] Tobacco Users Help Line:  (510) 893-6371       Toll Free:  7-904-690-7113  Quit Tobacco Program [facility] (399)873-9105    Crisis Hotline:    National Crisis Hotline:  4-991-YECHCCD or 1-455.337.5222    [place] Crisis Hotline:    1-324.466.9644 or 666-960-4227    Discharge Survey:   Thank you for choosing [facility]. We hope we did everything we could to make your hospital stay a pleasant one. You may be receiving a survey and we would appreciate your time and participation in answering the questions. Your input is very valuable to us in our efforts to improve our service to our patients and their families.            Signatures     My signature on this form indicates that:    1. I acknowledge receipt and understanding of these Home Care Instruction.  2. My questions regarding this information have been answered to my satisfaction.  3. I have formulated a plan with my discharge nurse to obtain my prescribed medications for home.    __________________________________      __________________________________                   Patient Signature                                 Guardian/Responsible Adult Signature      __________________________________                 __________       ________                       Nurse Signature                                               Date                 Time

## 2017-08-02 NOTE — IP AVS SNAPSHOT
8/2/2017    Luann Morales  [address]  [facility] [address]    Dear Luann:    [facility] wants to ensure your discharge home is safe and you or your loved ones have had all of your questions answered regarding your care after you leave the hospital.    Below is a list of resources and contact information should you have any questions regarding your hospital stay, follow-up instructions, or active medical symptoms.    Questions or Concerns Regarding… Contact   Medical Questions Related to Your Discharge  (7 days a week, 8am-5pm) Contact a Nurse Care Coordinator   [phone]   Medical Questions Not Related to Your Discharge  (24 hours a day / 7 days a week)  Contact the Nurse Health Line   [phone]    Medications or Discharge Instructions Refer to your discharge packet   or contact your [facility] Primary Care Provider   [phone]   Follow-up Appointment(s) Schedule your appointment via [employer]   or contact Scheduling [phone]   Billing Review your statement via [employer]  or contact Billing [phone]   Medical Records Review your records via [employer]   or contact Medical Records [phone]     You may receive a telephone call within two days of discharge. This call is to make certain you understand your discharge instructions and have the opportunity to have any questions answered. You can also easily access your medical information, test results and upcoming appointments via the [employer] free online health management tool. You can learn more and sign up at [employer]. For assistance setting up your [employer] account, please call [phone].    Once again, we want to ensure your discharge home is safe and that you have a clear understanding of any next steps in your care. If you have any questions or concerns, please do not hesitate to contact us, we are here for you. Thank you for choosing [facility] for your healthcare needs.    Sincerely,    Your [facility] Healthcare Team

## 2017-09-23 ENCOUNTER — HOSPITAL ENCOUNTER (OUTPATIENT)
Dept: LAB | Facility: MEDICAL CENTER | Age: 52
End: 2017-09-23
Attending: FAMILY MEDICINE
Payer: COMMERCIAL

## 2017-09-23 LAB
25(OH)D3 SERPL-MCNC: 59 NG/ML (ref 30–100)
ALBUMIN SERPL BCP-MCNC: 4.1 G/DL (ref 3.2–4.9)
ALBUMIN/GLOB SERPL: 1.3 G/DL
ALP SERPL-CCNC: 156 U/L (ref 30–99)
ALT SERPL-CCNC: 16 U/L (ref 2–50)
ANION GAP SERPL CALC-SCNC: 8 MMOL/L (ref 0–11.9)
APPEARANCE UR: CLEAR
AST SERPL-CCNC: 16 U/L (ref 12–45)
BASOPHILS # BLD AUTO: 0.7 % (ref 0–1.8)
BASOPHILS # BLD: 0.03 K/UL (ref 0–0.12)
BILIRUB SERPL-MCNC: 0.4 MG/DL (ref 0.1–1.5)
BILIRUB UR QL STRIP.AUTO: NEGATIVE
BUN SERPL-MCNC: 14 MG/DL (ref 8–22)
CALCIUM SERPL-MCNC: 9.2 MG/DL (ref 8.5–10.5)
CHLORIDE SERPL-SCNC: 106 MMOL/L (ref 96–112)
CHOLEST SERPL-MCNC: 172 MG/DL (ref 100–199)
CO2 SERPL-SCNC: 25 MMOL/L (ref 20–33)
COLOR UR: YELLOW
CREAT SERPL-MCNC: 0.88 MG/DL (ref 0.5–1.4)
CULTURE IF INDICATED INDCX: NO UA CULTURE
EOSINOPHIL # BLD AUTO: 0.16 K/UL (ref 0–0.51)
EOSINOPHIL NFR BLD: 3.6 % (ref 0–6.9)
ERYTHROCYTE [DISTWIDTH] IN BLOOD BY AUTOMATED COUNT: 48.1 FL (ref 35.9–50)
EST. AVERAGE GLUCOSE BLD GHB EST-MCNC: 128 MG/DL
GFR SERPL CREATININE-BSD FRML MDRD: >60 ML/MIN/1.73 M 2
GLOBULIN SER CALC-MCNC: 3.2 G/DL (ref 1.9–3.5)
GLUCOSE SERPL-MCNC: 92 MG/DL (ref 65–99)
GLUCOSE UR STRIP.AUTO-MCNC: NEGATIVE MG/DL
HBA1C MFR BLD: 6.1 % (ref 0–5.6)
HCT VFR BLD AUTO: 39.3 % (ref 37–47)
HDLC SERPL-MCNC: 55 MG/DL
HGB BLD-MCNC: 12.5 G/DL (ref 12–16)
IMM GRANULOCYTES # BLD AUTO: 0.02 K/UL (ref 0–0.11)
IMM GRANULOCYTES NFR BLD AUTO: 0.4 % (ref 0–0.9)
KETONES UR STRIP.AUTO-MCNC: NEGATIVE MG/DL
LDLC SERPL CALC-MCNC: 97 MG/DL
LEUKOCYTE ESTERASE UR QL STRIP.AUTO: NEGATIVE
LYMPHOCYTES # BLD AUTO: 1.26 K/UL (ref 1–4.8)
LYMPHOCYTES NFR BLD: 28.2 % (ref 22–41)
MCH RBC QN AUTO: 28 PG (ref 27–33)
MCHC RBC AUTO-ENTMCNC: 31.8 G/DL (ref 33.6–35)
MCV RBC AUTO: 88.1 FL (ref 81.4–97.8)
MICRO URNS: NORMAL
MONOCYTES # BLD AUTO: 0.34 K/UL (ref 0–0.85)
MONOCYTES NFR BLD AUTO: 7.6 % (ref 0–13.4)
NEUTROPHILS # BLD AUTO: 2.66 K/UL (ref 2–7.15)
NEUTROPHILS NFR BLD: 59.5 % (ref 44–72)
NITRITE UR QL STRIP.AUTO: NEGATIVE
NRBC # BLD AUTO: 0 K/UL
NRBC BLD AUTO-RTO: 0 /100 WBC
PH UR STRIP.AUTO: 6 [PH]
PLATELET # BLD AUTO: 291 K/UL (ref 164–446)
PMV BLD AUTO: 10.1 FL (ref 9–12.9)
POTASSIUM SERPL-SCNC: 4.1 MMOL/L (ref 3.6–5.5)
PROT SERPL-MCNC: 7.3 G/DL (ref 6–8.2)
PROT UR QL STRIP: NEGATIVE MG/DL
RBC # BLD AUTO: 4.46 M/UL (ref 4.2–5.4)
RBC UR QL AUTO: NEGATIVE
SODIUM SERPL-SCNC: 139 MMOL/L (ref 135–145)
SP GR UR STRIP.AUTO: 1.02
TRIGL SERPL-MCNC: 101 MG/DL (ref 0–149)
TSH SERPL DL<=0.005 MIU/L-ACNC: 0.14 UIU/ML (ref 0.3–3.7)
UROBILINOGEN UR STRIP.AUTO-MCNC: 0.2 MG/DL
WBC # BLD AUTO: 4.5 K/UL (ref 4.8–10.8)

## 2017-09-23 PROCEDURE — 80053 COMPREHEN METABOLIC PANEL: CPT

## 2017-09-23 PROCEDURE — 84443 ASSAY THYROID STIM HORMONE: CPT

## 2017-09-23 PROCEDURE — 82306 VITAMIN D 25 HYDROXY: CPT

## 2017-09-23 PROCEDURE — 81003 URINALYSIS AUTO W/O SCOPE: CPT

## 2017-09-23 PROCEDURE — 80061 LIPID PANEL: CPT

## 2017-09-23 PROCEDURE — 36415 COLL VENOUS BLD VENIPUNCTURE: CPT

## 2017-09-23 PROCEDURE — 83036 HEMOGLOBIN GLYCOSYLATED A1C: CPT

## 2017-09-23 PROCEDURE — 85025 COMPLETE CBC W/AUTO DIFF WBC: CPT

## 2017-11-18 ENCOUNTER — HOSPITAL ENCOUNTER (OUTPATIENT)
Dept: LAB | Facility: MEDICAL CENTER | Age: 52
End: 2017-11-18
Attending: FAMILY MEDICINE
Payer: COMMERCIAL

## 2017-11-18 LAB
ALBUMIN SERPL BCP-MCNC: 3.8 G/DL (ref 3.2–4.9)
ALBUMIN/GLOB SERPL: 1.3 G/DL
ALP SERPL-CCNC: 138 U/L (ref 30–99)
ALT SERPL-CCNC: 14 U/L (ref 2–50)
ANION GAP SERPL CALC-SCNC: 7 MMOL/L (ref 0–11.9)
AST SERPL-CCNC: 15 U/L (ref 12–45)
BILIRUB SERPL-MCNC: 0.3 MG/DL (ref 0.1–1.5)
BUN SERPL-MCNC: 18 MG/DL (ref 8–22)
CALCIUM SERPL-MCNC: 9.1 MG/DL (ref 8.5–10.5)
CHLORIDE SERPL-SCNC: 107 MMOL/L (ref 96–112)
CO2 SERPL-SCNC: 24 MMOL/L (ref 20–33)
CREAT SERPL-MCNC: 0.88 MG/DL (ref 0.5–1.4)
GFR SERPL CREATININE-BSD FRML MDRD: >60 ML/MIN/1.73 M 2
GLOBULIN SER CALC-MCNC: 3 G/DL (ref 1.9–3.5)
GLUCOSE SERPL-MCNC: 90 MG/DL (ref 65–99)
POTASSIUM SERPL-SCNC: 4.1 MMOL/L (ref 3.6–5.5)
PROT SERPL-MCNC: 6.8 G/DL (ref 6–8.2)
SODIUM SERPL-SCNC: 138 MMOL/L (ref 135–145)

## 2017-11-18 PROCEDURE — 80053 COMPREHEN METABOLIC PANEL: CPT

## 2017-11-18 PROCEDURE — 36415 COLL VENOUS BLD VENIPUNCTURE: CPT

## 2018-01-03 ENCOUNTER — OFFICE VISIT (OUTPATIENT)
Dept: URGENT CARE | Facility: PHYSICIAN GROUP | Age: 53
End: 2018-01-03
Payer: COMMERCIAL

## 2018-01-03 VITALS
SYSTOLIC BLOOD PRESSURE: 102 MMHG | TEMPERATURE: 98.2 F | HEART RATE: 76 BPM | BODY MASS INDEX: 39.95 KG/M2 | OXYGEN SATURATION: 97 % | HEIGHT: 64 IN | WEIGHT: 234 LBS | DIASTOLIC BLOOD PRESSURE: 70 MMHG

## 2018-01-03 DIAGNOSIS — E66.01 MORBID OBESITY WITH BMI OF 40.0-44.9, ADULT (HCC): ICD-10-CM

## 2018-01-03 DIAGNOSIS — J01.00 ACUTE NON-RECURRENT MAXILLARY SINUSITIS: ICD-10-CM

## 2018-01-03 PROCEDURE — 99203 OFFICE O/P NEW LOW 30 MIN: CPT | Performed by: PHYSICIAN ASSISTANT

## 2018-01-03 RX ORDER — AMOXICILLIN AND CLAVULANATE POTASSIUM 875; 125 MG/1; MG/1
1 TABLET, FILM COATED ORAL 2 TIMES DAILY
Qty: 14 TAB | Refills: 0 | Status: SHIPPED | OUTPATIENT
Start: 2018-01-03 | End: 2018-01-10

## 2018-01-03 ASSESSMENT — ENCOUNTER SYMPTOMS
SORE THROAT: 1
WHEEZING: 0
SINUS PRESSURE: 1
CHILLS: 1
SHORTNESS OF BREATH: 0
MYALGIAS: 0
GASTROINTESTINAL NEGATIVE: 1
SINUS PAIN: 1
FEVER: 1
PALPITATIONS: 0
COUGH: 1
HEADACHES: 1
SWOLLEN GLANDS: 1

## 2018-01-03 NOTE — LETTER
January 3, 2018         Patient: Luann Morales   YOB: 1965   Date of Visit: 1/3/2018           To Whom it May Concern:    Luann Morales was seen in my clinic on 1/3/2018. She may return to work on Thursday Jan. 4th.    If you have any questions or concerns, please don't hesitate to call.        Sincerely,           Jefry Hahn P.A.-C.  Electronically Signed

## 2018-01-03 NOTE — PROGRESS NOTES
Subjective:      Luann Morales is a 52 y.o. female who presents with Cough (Chest and sinus congestion, runny nose x 1 wk )            Sinus Problem   This is a new problem. The current episode started 1 to 4 weeks ago. The problem has been gradually worsening since onset. There has been no fever. The fever has been present for less than 1 day. Associated symptoms include chills, congestion, coughing, ear pain, headaches, sinus pressure, a sore throat and swollen glands. Pertinent negatives include no shortness of breath. Past treatments include spray decongestants (OTC cough and cold). The treatment provided mild relief.       PMH:  has a past medical history of Arthritis; Cancer ([facility]) (2000); Dental disorder; Essential hypertension; Heart burn; Hypothyroidism; Indigestion; Pain (07-); Snoring; and Unspecified disorder of thyroid (1999).  MEDS:   Current Outpatient Prescriptions:   •  acetaminophen (TYLENOL) 500 MG Tab, Take 1,500 mg by mouth 1 time daily as needed., Disp: , Rfl:   •  Glucosamine-Chondroitin (MOVE FREE PO), Take 2 Tabs by mouth every morning., Disp: , Rfl:   •  levothyroxine (SYNTHROID) 125 MCG TABS, Take 125 mcg by mouth Every morning on an empty stomach., Disp: , Rfl:   •  lisinopril (PRINIVIL, ZESTRIL) 40 MG tablet, Take 40 mg by mouth every morning. Indications: High Blood Pressure, Disp: , Rfl:   •  omeprazole (PRILOSEC) 20 MG CPDR, Take 20 mg by mouth every morning. Indications: Gastroesophageal Reflux Disease, Disp: , Rfl:   •  oxycodone immediate-release (ROXICODONE) 5 MG Tab, Take 1 Tab by mouth every four hours as needed for Severe Pain., Disp: 30 Tab, Rfl: 0  ALLERGIES:   Allergies   Allergen Reactions   • Bactrim [Sulfamethoxazole W-Trimethoprim] Itching   • Diclofenac Hives and Itching   • Doxycycline Hives   • Hydrocodone Itching and Vomiting   • Sulfamethizole Itching   • Tetracycline Itching   • Tramadol Itching   • Zpack [Azithromycin] Itching     SURGHX:   Past  Surgical History:   Procedure Laterality Date   • WRIST ARTHROSCOPY Left 2017    Procedure: WRIST ARTHROSCOPY TRIANGULAR FIBROCARTILAGE COMPLEX DEBRIDEMENT;  Surgeon: Davion Grey M.D.;  Location: SURGERY [facility];  Service:    • LIGAMENT REPAIR  2017    Procedure: LIGAMENT REPAIR ULNOTRIQUETRAL ;  Surgeon: Davion Grey M.D.;  Location: SURGERY [facility];  Service:    • BREAST RECONSTRUCTION  2016    reduction   • HYSTEROSCOPY WITH VIDEO OPERATIVE  2014    Performed by Pepe Deleon M.D. at SURGERY SAME DAY [facility]   • DILATION AND CURETTAGE  2014    Performed by Pepe Deleon M.D. at SURGERY SAME DAY [facility]   • TUBAL LIGATION     • EGD W/ENDOSCOPIC ULTRASOUND  2009    Performed by YARELI ANDERS at SURGERY [facility]   • ANIBAL BY LAPAROSCOPY     • LUMBAR FUSION POSTERIOR  2001   • THYROIDECTOMY TOTAL     • WRIST EXPLORATION      right; excision bone   • PRIMARY C SECTION  ,          SOCHX:  reports that she quit smoking about 22 years ago. Her smoking use included Cigarettes. She has a 2.50 pack-year smoking history. She has never used smokeless tobacco. She reports that she drinks alcohol. She reports that she does not use drugs.  FH: family history includes Heart Attack in her father; Hypertension in her mother.      Review of Systems   Constitutional: Positive for chills and fever (Resolved).   HENT: Positive for congestion, ear pain, sinus pain, sinus pressure and sore throat.    Respiratory: Positive for cough. Negative for shortness of breath and wheezing.    Cardiovascular: Negative for chest pain, palpitations and leg swelling.   Gastrointestinal: Negative.    Musculoskeletal: Negative for joint pain and myalgias.   Neurological: Positive for headaches.       Medications, Allergies, and current problem list reviewed today in Epic     Objective:     /70   Pulse 76   Temp 36.8 °C (98.2  "°F)   Ht 1.626 m (5' 4\")   Wt 106.1 kg (234 lb)   LMP 03/30/2014   SpO2 97%   BMI 40.17 kg/m²      Physical Exam   Constitutional: She is oriented to person, place, and time. She appears well-developed and well-nourished. No distress.   HENT:   Head: Normocephalic and atraumatic.   Right Ear: Hearing, tympanic membrane, external ear and ear canal normal. Tympanic membrane is not erythematous. No decreased hearing is noted.   Left Ear: Hearing, tympanic membrane, external ear and ear canal normal. Tympanic membrane is not erythematous. No decreased hearing is noted.   Nose: Right sinus exhibits maxillary sinus tenderness. Left sinus exhibits maxillary sinus tenderness.   Mouth/Throat: Normal dentition. Posterior oropharyngeal erythema present. No oropharyngeal exudate.   Eyes: Conjunctivae are normal. Right eye exhibits no discharge. Left eye exhibits no discharge. No scleral icterus.   Neck: Normal range of motion. Neck supple.   Cardiovascular: Normal rate, regular rhythm and normal heart sounds.    No murmur heard.  Pulmonary/Chest: Effort normal and breath sounds normal. No respiratory distress. She has no wheezes.   Lymphadenopathy:        Head (right side): Submandibular adenopathy present.        Head (left side): Submandibular adenopathy present.     She has no cervical adenopathy.        Right cervical: No superficial cervical adenopathy present.       Left cervical: No superficial cervical adenopathy present.   Neurological: She is alert and oriented to person, place, and time.   Skin: Skin is warm, dry and intact. She is not diaphoretic. No cyanosis. Nails show no clubbing.   Psychiatric: She has a normal mood and affect. Her behavior is normal. Judgment and thought content normal.   Nursing note and vitals reviewed.              Assessment/Plan:     1. Acute non-recurrent maxillary sinusitis  amoxicillin-clavulanate (AUGMENTIN) 875-125 MG Tab   2. Morbid obesity with BMI of 40.0-44.9, adult (CMS-HCC) "  Patient identified as having weight management issue.  Appropriate orders and counseling given.     Take full course of antibiotic  Tylenol and Motrin for fever and pain  OTC meds as discussed including oral decongestant if tolerated  Increase fluids and rest  Nasal spray, nasal wash, Chrissy pot  Return to clinic or go to ED if symptoms worsen or persist. Indications for ED discussed at length. Patient voices understanding. Follow-up with your primary care provider in 3-5 days. Red flags discussed.    Please note that this dictation was created using voice recognition software. I have made every reasonable attempt to correct obvious errors, but I expect that there are errors of grammar and possibly content that I did not discover before finalizing the note.

## 2018-01-04 PROBLEM — E66.01 MORBID OBESITY WITH BMI OF 40.0-44.9, ADULT (HCC): Status: ACTIVE | Noted: 2018-01-04

## 2018-01-06 ENCOUNTER — HOSPITAL ENCOUNTER (OUTPATIENT)
Dept: LAB | Facility: MEDICAL CENTER | Age: 53
End: 2018-01-06
Attending: FAMILY MEDICINE
Payer: COMMERCIAL

## 2018-01-06 LAB
T4 FREE SERPL-MCNC: 1.56 NG/DL (ref 0.53–1.43)
TSH SERPL DL<=0.005 MIU/L-ACNC: 0.03 UIU/ML (ref 0.38–5.33)

## 2018-01-06 PROCEDURE — 36415 COLL VENOUS BLD VENIPUNCTURE: CPT

## 2018-01-06 PROCEDURE — 84443 ASSAY THYROID STIM HORMONE: CPT

## 2018-01-06 PROCEDURE — 84439 ASSAY OF FREE THYROXINE: CPT

## 2018-01-07 ENCOUNTER — HOSPITAL ENCOUNTER (OUTPATIENT)
Facility: MEDICAL CENTER | Age: 53
End: 2018-01-07
Attending: FAMILY MEDICINE
Payer: COMMERCIAL

## 2018-01-07 PROCEDURE — 87493 C DIFF AMPLIFIED PROBE: CPT

## 2018-01-07 PROCEDURE — 89055 LEUKOCYTE ASSESSMENT FECAL: CPT

## 2018-01-07 PROCEDURE — 87338 HPYLORI STOOL AG IA: CPT

## 2018-01-08 ENCOUNTER — OFFICE VISIT (OUTPATIENT)
Dept: URGENT CARE | Facility: PHYSICIAN GROUP | Age: 53
End: 2018-01-08
Payer: COMMERCIAL

## 2018-01-08 ENCOUNTER — HOSPITAL ENCOUNTER (OUTPATIENT)
Facility: MEDICAL CENTER | Age: 53
End: 2018-01-08
Attending: FAMILY MEDICINE
Payer: COMMERCIAL

## 2018-01-08 VITALS
DIASTOLIC BLOOD PRESSURE: 74 MMHG | WEIGHT: 228 LBS | RESPIRATION RATE: 18 BRPM | HEIGHT: 64 IN | BODY MASS INDEX: 38.93 KG/M2 | TEMPERATURE: 98.4 F | SYSTOLIC BLOOD PRESSURE: 126 MMHG | OXYGEN SATURATION: 96 % | HEART RATE: 119 BPM

## 2018-01-08 DIAGNOSIS — R19.7 DIARRHEA, UNSPECIFIED TYPE: ICD-10-CM

## 2018-01-08 DIAGNOSIS — G43.A0 CYCLICAL VOMITING WITH NAUSEA, INTRACTABILITY OF VOMITING NOT SPECIFIED: ICD-10-CM

## 2018-01-08 LAB
C DIFF DNA SPEC QL NAA+PROBE: NEGATIVE
C DIFF TOX GENS STL QL NAA+PROBE: NEGATIVE
G LAMBLIA+C PARVUM AG STL QL RAPID: NORMAL
H PYLORI AG STL QL IA: DETECTED
SIGNIFICANT IND 70042: NORMAL
SITE SITE: NORMAL
SOURCE SOURCE: NORMAL
WBC STL QL MICRO: NORMAL

## 2018-01-08 PROCEDURE — 87329 GIARDIA AG IA: CPT

## 2018-01-08 PROCEDURE — 89055 LEUKOCYTE ASSESSMENT FECAL: CPT

## 2018-01-08 PROCEDURE — 87046 STOOL CULTR AEROBIC BACT EA: CPT

## 2018-01-08 PROCEDURE — 87338 HPYLORI STOOL AG IA: CPT

## 2018-01-08 PROCEDURE — 87899 AGENT NOS ASSAY W/OPTIC: CPT

## 2018-01-08 PROCEDURE — 87045 FECES CULTURE AEROBIC BACT: CPT

## 2018-01-08 PROCEDURE — 87493 C DIFF AMPLIFIED PROBE: CPT

## 2018-01-08 PROCEDURE — 99214 OFFICE O/P EST MOD 30 MIN: CPT | Performed by: PHYSICIAN ASSISTANT

## 2018-01-08 PROCEDURE — 87328 CRYPTOSPORIDIUM AG IA: CPT

## 2018-01-08 RX ORDER — DICYCLOMINE HCL 20 MG
20 TABLET ORAL EVERY 6 HOURS
Qty: 20 TAB | Refills: 0 | Status: SHIPPED | OUTPATIENT
Start: 2018-01-08 | End: 2018-01-13

## 2018-01-08 RX ORDER — ONDANSETRON 2 MG/ML
4 INJECTION INTRAMUSCULAR; INTRAVENOUS ONCE
Status: COMPLETED | OUTPATIENT
Start: 2018-01-08 | End: 2018-01-08

## 2018-01-08 RX ORDER — DIPHENOXYLATE HYDROCHLORIDE AND ATROPINE SULFATE 2.5; .025 MG/1; MG/1
1 TABLET ORAL 4 TIMES DAILY PRN
Qty: 12 TAB | Refills: 0 | Status: SHIPPED | OUTPATIENT
Start: 2018-01-08 | End: 2018-01-11

## 2018-01-08 RX ORDER — ONDANSETRON 4 MG/1
4 TABLET, ORALLY DISINTEGRATING ORAL EVERY 8 HOURS PRN
Qty: 15 TAB | Refills: 0 | Status: SHIPPED | OUTPATIENT
Start: 2018-01-08 | End: 2018-01-13

## 2018-01-08 RX ADMIN — ONDANSETRON 4 MG: 2 INJECTION INTRAMUSCULAR; INTRAVENOUS at 11:45

## 2018-01-08 ASSESSMENT — ENCOUNTER SYMPTOMS
ANOREXIA: 1
FLANK PAIN: 0
EYES NEGATIVE: 1
BELCHING: 0
HEMATOCHEZIA: 0
MYALGIAS: 0
DIARRHEA: 1
FEVER: 0
VOMITING: 1
ABDOMINAL PAIN: 1
ARTHRALGIAS: 0
NAUSEA: 1
CARDIOVASCULAR NEGATIVE: 1
DIZZINESS: 1
RESPIRATORY NEGATIVE: 1

## 2018-01-08 NOTE — PATIENT INSTRUCTIONS
Nausea and Vomiting  Nausea is a sick feeling that often comes before throwing up (vomiting). Vomiting is a reflex where stomach contents come out of your mouth. Vomiting can cause severe loss of body fluids (dehydration). Children and elderly adults can become dehydrated quickly, especially if they also have diarrhea. Nausea and vomiting are symptoms of a condition or disease. It is important to find the cause of your symptoms.  CAUSES   · Direct irritation of the stomach lining. This irritation can result from increased acid production (gastroesophageal reflux disease), infection, food poisoning, taking certain medicines (such as nonsteroidal anti-inflammatory drugs), alcohol use, or tobacco use.  · Signals from the brain. These signals could be caused by a headache, heat exposure, an inner ear disturbance, increased pressure in the brain from injury, infection, a tumor, or a concussion, pain, emotional stimulus, or metabolic problems.  · An obstruction in the gastrointestinal tract (bowel obstruction).  · Illnesses such as diabetes, hepatitis, gallbladder problems, appendicitis, kidney problems, cancer, sepsis, atypical symptoms of a heart attack, or eating disorders.  · Medical treatments such as chemotherapy and radiation.  · Receiving medicine that makes you sleep (general anesthetic) during surgery.  DIAGNOSIS  Your caregiver may ask for tests to be done if the problems do not improve after a few days. Tests may also be done if symptoms are severe or if the reason for the nausea and vomiting is not clear. Tests may include:  · Urine tests.  · Blood tests.  · Stool tests.  · Cultures (to look for evidence of infection).  · X-rays or other imaging studies.  Test results can help your caregiver make decisions about treatment or the need for additional tests.  TREATMENT  You need to stay well hydrated. Drink frequently but in small amounts. You may wish to drink water, sports drinks, clear broth, or eat frozen  ice pops or gelatin dessert to help stay hydrated. When you eat, eating slowly may help prevent nausea. There are also some antinausea medicines that may help prevent nausea.  HOME CARE INSTRUCTIONS   · Take all medicine as directed by your caregiver.  · If you do not have an appetite, do not force yourself to eat. However, you must continue to drink fluids.  · If you have an appetite, eat a normal diet unless your caregiver tells you differently.  ¨ Eat a variety of complex carbohydrates (rice, wheat, potatoes, bread), lean meats, yogurt, fruits, and vegetables.  ¨ Avoid high-fat foods because they are more difficult to digest.  · Drink enough water and fluids to keep your urine clear or pale yellow.  · If you are dehydrated, ask your caregiver for specific rehydration instructions. Signs of dehydration may include:  ¨ Severe thirst.  ¨ Dry lips and mouth.  ¨ Dizziness.  ¨ Dark urine.  ¨ Decreasing urine frequency and amount.  ¨ Confusion.  ¨ Rapid breathing or pulse.  SEEK IMMEDIATE MEDICAL CARE IF:   · You have blood or brown flecks (like coffee grounds) in your vomit.  · You have black or bloody stools.  · You have a severe headache or stiff neck.  · You are confused.  · You have severe abdominal pain.  · You have chest pain or trouble breathing.  · You do not urinate at least once every 8 hours.  · You develop cold or clammy skin.  · You continue to vomit for longer than 24 to 48 hours.  · You have a fever.  MAKE SURE YOU:   · Understand these instructions.  · Will watch your condition.  · Will get help right away if you are not doing well or get worse.     This information is not intended to replace advice given to you by your health care provider. Make sure you discuss any questions you have with your health care provider.     Document Released: 12/18/2006 Document Revised: 03/11/2013 Document Reviewed: 05/16/2012  [employer] Patient Education ©2016 [employer]    Go to the ER if symptoms change,  get worse, new symptoms develop.

## 2018-01-08 NOTE — LETTER
January 8, 2018         Patient: Luann Morales   YOB: 1965   Date of Visit: 1/8/2018           To Whom it May Concern:    Luann Morales was seen in my clinic on 1/8/2018. She may return to work on 01/10/18.    If you have any questions or concerns, please don't hesitate to call.        Sincerely,           Mary Lazaro P.A.-C.  Electronically Signed

## 2018-01-08 NOTE — PROGRESS NOTES
Subjective:      Luann Morales is a 52 y.o. female who presents with Abdominal Pain (vomiting, diarrhea x 3 days)            Abdominal Pain   This is a new problem. Episode onset: 3 days. The onset quality is gradual. The problem occurs constantly. The problem has been unchanged. The pain is located in the generalized abdominal region. The pain is moderate. The quality of the pain is cramping. The abdominal pain does not radiate. Associated symptoms include anorexia, diarrhea, nausea and vomiting. Pertinent negatives include no arthralgias, belching, dysuria, fever, frequency, hematochezia, hematuria, melena or myalgias. The pain is aggravated by eating (any oral intake of fluids/food). The pain is relieved by nothing. She has tried nothing for the symptoms. The treatment provided no relief. History of similar symptoms of abdominal pain, N/V/D every three months.  Has been seen in ER and by PCP for symptoms multiple times in the past.  Current episode feels the same as previous episodes.  Denies use of marijuana.     Patient states that medication given while in the ED on her previous visit helped resolve abdominal cramping.  Prescribed Zofran, Bentyl and Lomotil with resolution of symptoms.  Current episode with vomiting 10-20 episodes in the past 24 hours, Diarrhea 10-20 episodes in the past 24 hours.  Now with small amount of bright red blood on TP at end of diarrhea;  No history of blood in stool.      Review of Systems   Constitutional: Positive for malaise/fatigue. Negative for fever.   HENT: Negative.    Eyes: Negative.    Respiratory: Negative.    Cardiovascular: Negative.    Gastrointestinal: Positive for abdominal pain, anorexia, diarrhea, nausea and vomiting. Negative for hematochezia and melena.   Genitourinary: Negative for dysuria, flank pain, frequency, hematuria and urgency.   Musculoskeletal: Negative for arthralgias and myalgias.   Neurological: Positive for dizziness.          Objective:  "    /74   Pulse (!) 119   Temp 36.9 °C (98.4 °F)   Resp 18   Ht 1.626 m (5' 4\")   Wt 103.4 kg (228 lb)   LMP 03/30/2014   SpO2 96%   BMI 39.14 kg/m²      Physical Exam   Constitutional: She is oriented to person, place, and time. She appears well-developed and well-nourished.   HENT:   Head: Normocephalic.   Right Ear: External ear normal.   Left Ear: External ear normal.   Mouth/Throat: Oropharynx is clear and moist.   Oral mucous membranes are moist   Eyes: Conjunctivae and EOM are normal. Pupils are equal, round, and reactive to light.   Neck: Normal range of motion. Neck supple.   Cardiovascular: Normal rate, regular rhythm and normal heart sounds.    Pulmonary/Chest: Effort normal and breath sounds normal.   Abdominal: Soft. She exhibits no mass. There is tenderness. There is no guarding.   Diffuse abdominal tenderness   Neurological: She is alert and oriented to person, place, and time.   Skin: Skin is warm and dry.   Psychiatric: She has a normal mood and affect. Her behavior is normal. Judgment and thought content normal.   Nursing note and vitals reviewed.              Assessment/Plan:     1. Cyclical vomiting with nausea, intractability of vomiting not specified  - ondansetron (ZOFRAN) syringe/vial injection 4 mg; 2 mL by Intramuscular route Once.  - REFERRAL TO GASTROENTEROLOGY  - dicyclomine (BENTYL) 20 MG Tab; Take 1 Tab by mouth every 6 hours for 5 days.  Dispense: 20 Tab; Refill: 0  - ondansetron (ZOFRAN ODT) 4 MG TABLET DISPERSIBLE; Take 1 Tab by mouth every 8 hours as needed for up to 5 days.  Dispense: 15 Tab; Refill: 0    2. Diarrhea, unspecified type  - diphenoxylate-atropine (LOMOTIL) 2.5-0.025 MG Tab; Take 1 Tab by mouth 4 times a day as needed for Diarrhea for up to 3 days.  Dispense: 12 Tab; Refill: 0    Patient states current symptoms are identical to previous episodes she has about every 3 months.  She has been evaluated at the ER and by her PCP.  She just submitted stool " samples this morning for evaluation.  Abdominal cramping is diffuse, no point pain/tenderness.  Given Zofran in the UC today IM.  States medications prescribed by the ER on her previous visit helped her symptoms, these including Bentyl, Lomotil and Zofran.  Will provide this same treatment regimen to the patient.  Go to the ER if symptoms change, get worse, new symptoms develop.  Referral placed to follow-up with GI specialist.  Follow-up if symptoms change, get worse or new symptoms develop.

## 2018-01-09 LAB
E COLI SXT1+2 STL IA: NORMAL
SIGNIFICANT IND 70042: NORMAL
SITE SITE: NORMAL
SOURCE SOURCE: NORMAL

## 2018-01-11 ENCOUNTER — HOSPITAL ENCOUNTER (OUTPATIENT)
Dept: LAB | Facility: MEDICAL CENTER | Age: 53
End: 2018-01-11
Attending: INTERNAL MEDICINE
Payer: COMMERCIAL

## 2018-01-11 LAB
AMYLASE SERPL-CCNC: 31 U/L (ref 20–103)
BACTERIA STL CULT: NORMAL
CRP SERPL HS-MCNC: 2 MG/DL (ref 0–0.75)
E COLI SXT1+2 STL IA: NORMAL
ERYTHROCYTE [SEDIMENTATION RATE] IN BLOOD BY WESTERGREN METHOD: 21 MM/HOUR (ref 0–30)
GGT SERPL-CCNC: 71 U/L (ref 7–34)
HAV IGM SERPL QL IA: NEGATIVE
HBV CORE IGM SER QL: NEGATIVE
HBV SURFACE AG SER QL: NEGATIVE
HCV AB SER QL: NEGATIVE
LIPASE SERPL-CCNC: 21 U/L (ref 11–82)
SIGNIFICANT IND 70042: NORMAL
SITE SITE: NORMAL
SOURCE SOURCE: NORMAL
TSH SERPL DL<=0.005 MIU/L-ACNC: 0.16 UIU/ML (ref 0.38–5.33)

## 2018-01-11 PROCEDURE — 82784 ASSAY IGA/IGD/IGG/IGM EACH: CPT

## 2018-01-11 PROCEDURE — 84443 ASSAY THYROID STIM HORMONE: CPT

## 2018-01-11 PROCEDURE — 80074 ACUTE HEPATITIS PANEL: CPT

## 2018-01-11 PROCEDURE — 36415 COLL VENOUS BLD VENIPUNCTURE: CPT

## 2018-01-11 PROCEDURE — 82977 ASSAY OF GGT: CPT

## 2018-01-11 PROCEDURE — 86140 C-REACTIVE PROTEIN: CPT

## 2018-01-11 PROCEDURE — 82150 ASSAY OF AMYLASE: CPT

## 2018-01-11 PROCEDURE — 83516 IMMUNOASSAY NONANTIBODY: CPT

## 2018-01-11 PROCEDURE — 83690 ASSAY OF LIPASE: CPT

## 2018-01-11 PROCEDURE — 85652 RBC SED RATE AUTOMATED: CPT

## 2018-01-13 LAB
IGA SERPL-MCNC: 319 MG/DL (ref 68–408)
TTG IGA SER IA-ACNC: 1 U/ML (ref 0–3)

## 2018-01-27 ENCOUNTER — HOSPITAL ENCOUNTER (OUTPATIENT)
Dept: RADIOLOGY | Facility: MEDICAL CENTER | Age: 53
End: 2018-01-27
Attending: INTERNAL MEDICINE
Payer: COMMERCIAL

## 2018-01-27 DIAGNOSIS — R19.4 CHANGE IN BOWEL HABITS: ICD-10-CM

## 2018-01-27 DIAGNOSIS — R10.13 EPIGASTRIC PAIN: ICD-10-CM

## 2018-01-27 DIAGNOSIS — A04.8 HELICOBACTER PYLORI INFECTION: ICD-10-CM

## 2018-01-27 DIAGNOSIS — R19.7 DIARRHEA, UNSPECIFIED TYPE: ICD-10-CM

## 2018-01-27 DIAGNOSIS — R11.2 NAUSEA AND VOMITING, INTRACTABILITY OF VOMITING NOT SPECIFIED, UNSPECIFIED VOMITING TYPE: ICD-10-CM

## 2018-01-27 DIAGNOSIS — R74.01 ALT (SGPT) LEVEL RAISED: ICD-10-CM

## 2018-01-27 DIAGNOSIS — R63.4 UNEXPLAINED WEIGHT LOSS: ICD-10-CM

## 2018-01-27 DIAGNOSIS — R74.8 ACID PHOSPHATASE ELEVATED: ICD-10-CM

## 2018-01-27 PROCEDURE — 76700 US EXAM ABDOM COMPLETE: CPT

## 2018-02-14 ENCOUNTER — OFFICE VISIT (OUTPATIENT)
Dept: INTERNAL MEDICINE | Facility: MEDICAL CENTER | Age: 53
End: 2018-02-14
Payer: COMMERCIAL

## 2018-02-14 VITALS
OXYGEN SATURATION: 94 % | TEMPERATURE: 97.9 F | BODY MASS INDEX: 39.09 KG/M2 | HEART RATE: 74 BPM | SYSTOLIC BLOOD PRESSURE: 105 MMHG | DIASTOLIC BLOOD PRESSURE: 75 MMHG | WEIGHT: 229 LBS | HEIGHT: 64 IN

## 2018-02-14 DIAGNOSIS — R73.03 PREDIABETES: ICD-10-CM

## 2018-02-14 DIAGNOSIS — E66.9 OBESITY (BMI 30-39.9): ICD-10-CM

## 2018-02-14 DIAGNOSIS — A04.8 HELICOBACTER PYLORI INFECTION: ICD-10-CM

## 2018-02-14 DIAGNOSIS — I10 ESSENTIAL HYPERTENSION: Chronic | ICD-10-CM

## 2018-02-14 DIAGNOSIS — K21.9 GERD WITHOUT ESOPHAGITIS: ICD-10-CM

## 2018-02-14 DIAGNOSIS — Z29.9 PREVENTIVE MEASURE: ICD-10-CM

## 2018-02-14 DIAGNOSIS — E89.0 POSTOPERATIVE HYPOTHYROIDISM: ICD-10-CM

## 2018-02-14 PROCEDURE — 99204 OFFICE O/P NEW MOD 45 MIN: CPT | Mod: GC | Performed by: INTERNAL MEDICINE

## 2018-02-14 RX ORDER — M-VIT,TX,IRON,MINS/CALC/FOLIC 27MG-0.4MG
1 TABLET ORAL DAILY
COMMUNITY
End: 2021-10-07

## 2018-02-14 RX ORDER — LISINOPRIL 5 MG/1
5 TABLET ORAL DAILY
Qty: 30 TAB | Refills: 0 | Status: SHIPPED | OUTPATIENT
Start: 2018-02-14 | End: 2018-03-01

## 2018-02-14 ASSESSMENT — PATIENT HEALTH QUESTIONNAIRE - PHQ9: CLINICAL INTERPRETATION OF PHQ2 SCORE: 0

## 2018-02-14 ASSESSMENT — PAIN SCALES - GENERAL: PAINLEVEL: NO PAIN

## 2018-02-14 NOTE — LETTER
February 14, 2018       Patient: Luann Morales   YOB: 1965   Date of Visit: 2/14/2018         To Whom It May Concern:    Ms. Luann Morales was seen in the office for her appointment today 2/14/2018.    If you have any questions or concerns, please don't hesitate to call [phone]          Sincerely,          Christiana Santos M.D.  Electronically Signed

## 2018-02-14 NOTE — PROGRESS NOTES
New Patient to Establish    Reason to establish: evaluation of ongoing symptoms and f/u chronic conditions    CC: hypotension and right breast redness    HPI: 52-year-old female patient with a past medical history of hypertension on antihypertensive medication, thyroid cancer status post thyroidectomy 1999, recent H. pylori infection and GERD comes in to establish care and follow-up of her coronary conditions.    #1 Hypertension: Patient states she is currently on lisinopril 10 mg and feels dizzy whenever she gets up suddenly and brought blood pressure monitoring log with readings showing systolic blood pressure in 90s and diastolic blood pressure ranging in the 60s. Denies chest pain, palpitations, shortness of breath, blurry vision or problems with urination. She was on lisinopril 20-40 mg once daily in the past which were decreased to her current dosage by her previous PCP.    #2 Postoperative hypothyroidism: Patient was diagnosed with thyroid cancer in the remote past and undergone thyroidectomy in 1999 since then has been on thyroid replacement with levothyroxine. Currently on levothyroxine 137mcg by mouth once daily. Her recent TSH level on 1/11/18 showed up in 0.160. Her previous PCP increased her levothyroxine dose from 125mcg to 137mcg by mouth once daily.  Denies any related symptoms.    #3 Helicobacter pylori infection: The patient was recently diagnosed with H. pylori infection when she was having problems with abdomen like abdominal pain. Followed up with GI-and completed 2 weeks course of antibiotics. Has an upcoming follow-up appointment with GI in March. Denies abdominal pain, nausea, vomiting, dark stools, constipation, blood in the urine.    #4 GERD: Patient states she has a long history of heartburn with reflux symptoms and currently on omeprazole 40 mg with good control of the symptoms. As long as patient takes Prilosec her symptoms are under control.       Patient Active Problem List     Diagnosis Date Noted   • Morbid obesity with BMI of 40.0-44.9, adult ([facility]) 01/04/2018   • Left wrist pain 08/02/2017   • Lunotriquetral ligament tear 08/02/2017   • Palpitations 07/26/2016   • Essential hypertension    • Postoperative hypothyroidism    • Large breasts    • Perimenopausal    • Unspecified disorder of menstruation and other abnormal bleeding from female genital tract 04/11/2014       Past Medical History:   Diagnosis Date   • Arthritis      knees   • Cancer ([facility]) 2000    thyroid ca   • Dental disorder     upper/lower partials   • Essential hypertension    • Heart burn    • Hypothyroidism    • Indigestion    • Pain 07-    left wrist, 6/10   • Snoring     no sleep study   • Unspecified disorder of thyroid 1999    thyroid cancer w/ radiation/surgery       Current Outpatient Prescriptions   Medication Sig Dispense Refill   • therapeutic multivitamin-minerals (THERAGRAN-M) Tab Take 1 Tab by mouth every day.     • Probiotic Product (PROBIOTIC DAILY PO) Take  by mouth.     • Glucosamine-Chondroitin (MOVE FREE PO) Take 2 Tabs by mouth every morning.     • levothyroxine (SYNTHROID) 125 MCG TABS Take 137 mcg by mouth Every morning on an empty stomach.     • lisinopril (PRINIVIL, ZESTRIL) 40 MG tablet Take 10 mg by mouth every morning.     • omeprazole (PRILOSEC) 20 MG CPDR Take 40 mg by mouth every morning.       No current facility-administered medications for this visit.        Allergies as of 02/14/2018 - Reviewed 02/14/2018   Allergen Reaction Noted   • Bactrim [sulfamethoxazole w-trimethoprim] Itching 04/09/2014   • Diclofenac Hives and Itching 10/16/2013   • Doxycycline Hives 10/16/2013   • Hydrocodone Itching and Vomiting 10/16/2013   • Sulfamethizole Itching 10/16/2013   • Tetracycline Itching 10/16/2013   • Tramadol Itching 10/16/2013   • Zpack [azithromycin] Itching 11/20/2009       Social History     Social History   • Marital status:      Spouse name: N/A   • Number of children:  N/A   • Years of education: N/A     Occupational History   • Not on file.     Social History Main Topics   • Smoking status: Former Smoker     Packs/day: 0.50     Years: 5.00     Types: Cigarettes     Quit date: 1996   • Smokeless tobacco: Never Used      Comment:    • Alcohol use 0.0 oz/week      Comment: 5/week   • Drug use: No   • Sexual activity: Not on file     Other Topics Concern   • Not on file     Social History Narrative   • No narrative on file       Family History   Problem Relation Age of Onset   • Hypertension Mother    • Heart Attack Father    • Heart Disease Father    • Alzheimer's Disease Father    • Suicide Attempts Brother    • Diabetes Maternal Grandmother    • Colon Cancer Maternal Grandfather        Past Surgical History:   Procedure Laterality Date   • WRIST ARTHROSCOPY Left 2017    Procedure: WRIST ARTHROSCOPY TRIANGULAR FIBROCARTILAGE COMPLEX DEBRIDEMENT;  Surgeon: Davion Grey M.D.;  Location: SURGERY [facility];  Service:    • LIGAMENT REPAIR  2017    Procedure: LIGAMENT REPAIR ULNOTRIQUETRAL ;  Surgeon: Davion Grey M.D.;  Location: SURGERY [facility];  Service:    • BREAST RECONSTRUCTION  2016    reduction   • HYSTEROSCOPY WITH VIDEO OPERATIVE  2014    Performed by Pepe Deleon M.D. at SURGERY SAME DAY [facility]   • DILATION AND CURETTAGE  2014    Performed by Pepe Deleon M.D. at SURGERY SAME DAY [facility]   • TUBAL LIGATION     • EGD W/ENDOSCOPIC ULTRASOUND  2009    Performed by YARELI ANDERS at SURGERY [facility]   • ANIBAL BY LAPAROSCOPY     • LUMBAR FUSION POSTERIOR  2001   • THYROIDECTOMY TOTAL     • WRIST EXPLORATION      right; excision bone   • PRIMARY C SECTION  ,            ROS: As per HPI. Additional pertinent symptoms as noted below.    Constitutional: Denies fever/chills/weight changes.   Eyes: Denies changes/pain in vision  ENT: Denies sore  "throat/congestion/ear ache.   Cardiovascular: Denies chest pain /palpitations/edema.   Respiratory: Denies SOB/cough/PND/orthopnea  Abdomen: Denies difficulty swallowing/ diarrhea/constipation/abdominal pain/nausea/vomiting  Genitourinary: Normal urinary habits.   Musculo-skeletal: normal ambulation.Denies muscle or joint pains.  Skin: Denies rash/lesions. Positive for rash noted on the right breast yesterday which is not evident today  Neurological: Denies weakness/tingling/numbness/headache  Psychological: good mood and cooperative. Denies anxiety /depression       /75   Pulse 74   Temp 36.6 °C (97.9 °F)   Ht 1.626 m (5' 4\")   Wt 103.9 kg (229 lb)   LMP 03/30/2014   SpO2 94%   BMI 39.31 kg/m²     Physical Exam  General:  Alert and oriented, No apparent distress.    Eyes: Pupils equal and reactive. No scleral icterus.    Throat: Clear no erythema or exudates noted.    Neck: Supple. No lymphadenopathy noted. Thyroid not enlarged.    Lungs: Clear to auscultation and percussion bilaterally. Scars from the previous surgery are noted on the right breast and scar tissue palpated. No redness or swelling or discharge or signs of infection noted    Cardiovascular: Regular rate and rhythm. No murmurs, rubs or gallops.    Abdomen:  Obese .Benign. No rebound or guarding noted.    Extremities: No clubbing, cyanosis, edema.    Skin: Clear. No rash or suspicious skin lesions noted.    Neurological: Oriented to time, place, and person .Cranial nerves intact. No motor/sensory deficits.Reflexes were normal and symmetrical in both upper and lower extremities     Musculoskeletal : NROM of all extremities. No tenderness or deformity noted.     Note: I have reviewed all pertinent labs and diagnostic tests associated with this visit with specific comments listed under the assessment and plan below      Assessment and Plan    1. Essential hypertension  -Today blood pressure is 105/75 patient states her blood pressure readings " at home have been systolic ranging in 90s and diastolic pressures ranging in the 60s  -Also complains of dizziness when she suddenly gets up.  -Currently on lisinopril 10 mg by mouth once daily.  -Advised to decrease the dose of lisinopril from 10 mg to 5 mg by mouth once daily.  -Ordered microalbumin creatinine ratio, CMP and follow-up in 2 weeks to monitor the blood pressure.    2. Helicobacter pylori infection  -Recently diagnosed in January with H. pylori infection when she was having abdominal pain  -Followed up with gastroenterologist and completed 2 weeks course of antibiotics  - Denies any related symptoms currently  -Has an upcoming appointment with gastroenterologist in early March   -Will continue to follow up    4. Obesity (BMI 30-39.9)  -Advised to eat healthy-DASH diet  -Encouraged to exercise regularly at least 30 minutes daily for 5 days a week    5. Postoperative hypothyroidism  -Has a remote history of thyroid cancer and undergone surgery in 1999  -Since then has been on levothyroxine.  -Her recent TSH level in January showed 0.16 and her previous PCP increased her dose of levothyroxine from 125mcg to 137mcg by mouth once daily and patient is advised to continue the same  -Will follow up with repeat TSH level in 3 months   -currently denies any related symptoms    6. GERD without esophagitis  - Has a long history and currently stable with Prilosec 40 mg by mouth once daily.  -Denies any related symptoms and advised to continue the same.    7. Preventive measure  - patient did not had flu vaccination this season  - had tetanus vaccination in 2015  - has upcoming first colonoscopy for screening colorectal cancer in march 2018  - had mammogram last year when she had breast reduction surgery December 2016 and due for one this year and ordered today.  - Had Pap smear around 2-3 years back and due for one. Will order in the next visit    Follow-up: Return in 2 weeks for follow-up of blood pressure or  early if any acute issues.    Risk Assessment (discuss potential complications a function of chronic problems): spent 35 min's discussing plans of management and educating patient regarding her current conditions and related complications    Complexity (discuss number of co-morbidities): discussed HTN, H.pylori infection, redness of mass of right breast, obesity, hypothyroidism and GERD    Signed by: Christiana Santos M.D.

## 2018-02-14 NOTE — PATIENT INSTRUCTIONS

## 2018-02-24 ENCOUNTER — HOSPITAL ENCOUNTER (OUTPATIENT)
Dept: LAB | Facility: MEDICAL CENTER | Age: 53
End: 2018-02-24
Attending: INTERNAL MEDICINE
Payer: COMMERCIAL

## 2018-02-24 DIAGNOSIS — R73.03 PREDIABETES: ICD-10-CM

## 2018-02-24 DIAGNOSIS — I10 ESSENTIAL HYPERTENSION: Chronic | ICD-10-CM

## 2018-02-24 LAB
ALBUMIN SERPL BCP-MCNC: 3.9 G/DL (ref 3.2–4.9)
ALBUMIN/GLOB SERPL: 1.3 G/DL
ALP SERPL-CCNC: 174 U/L (ref 30–99)
ALT SERPL-CCNC: 23 U/L (ref 2–50)
ANION GAP SERPL CALC-SCNC: 7 MMOL/L (ref 0–11.9)
AST SERPL-CCNC: 20 U/L (ref 12–45)
BILIRUB SERPL-MCNC: 0.3 MG/DL (ref 0.1–1.5)
BUN SERPL-MCNC: 16 MG/DL (ref 8–22)
CALCIUM SERPL-MCNC: 9.2 MG/DL (ref 8.5–10.5)
CHLORIDE SERPL-SCNC: 106 MMOL/L (ref 96–112)
CO2 SERPL-SCNC: 26 MMOL/L (ref 20–33)
CREAT SERPL-MCNC: 0.85 MG/DL (ref 0.5–1.4)
CREAT UR-MCNC: 75.7 MG/DL
EST. AVERAGE GLUCOSE BLD GHB EST-MCNC: 134 MG/DL
GLOBULIN SER CALC-MCNC: 3.1 G/DL (ref 1.9–3.5)
GLUCOSE SERPL-MCNC: 86 MG/DL (ref 65–99)
HBA1C MFR BLD: 6.3 % (ref 0–5.6)
MICROALBUMIN UR-MCNC: <0.7 MG/DL
MICROALBUMIN/CREAT UR: NORMAL MG/G (ref 0–30)
POTASSIUM SERPL-SCNC: 4 MMOL/L (ref 3.6–5.5)
PROT SERPL-MCNC: 7 G/DL (ref 6–8.2)
SODIUM SERPL-SCNC: 139 MMOL/L (ref 135–145)

## 2018-02-24 PROCEDURE — 82043 UR ALBUMIN QUANTITATIVE: CPT

## 2018-02-24 PROCEDURE — 83036 HEMOGLOBIN GLYCOSYLATED A1C: CPT

## 2018-02-24 PROCEDURE — 80053 COMPREHEN METABOLIC PANEL: CPT

## 2018-02-24 PROCEDURE — 36415 COLL VENOUS BLD VENIPUNCTURE: CPT

## 2018-02-24 PROCEDURE — 82570 ASSAY OF URINE CREATININE: CPT

## 2018-03-01 ENCOUNTER — OFFICE VISIT (OUTPATIENT)
Dept: INTERNAL MEDICINE | Facility: MEDICAL CENTER | Age: 53
End: 2018-03-01
Payer: COMMERCIAL

## 2018-03-01 VITALS
HEIGHT: 64 IN | OXYGEN SATURATION: 97 % | TEMPERATURE: 97.9 F | WEIGHT: 230 LBS | BODY MASS INDEX: 39.27 KG/M2 | DIASTOLIC BLOOD PRESSURE: 71 MMHG | HEART RATE: 89 BPM | SYSTOLIC BLOOD PRESSURE: 97 MMHG

## 2018-03-01 DIAGNOSIS — Z00.00 HEALTH CARE MAINTENANCE: ICD-10-CM

## 2018-03-01 DIAGNOSIS — E89.0 POSTOPERATIVE HYPOTHYROIDISM: ICD-10-CM

## 2018-03-01 DIAGNOSIS — R73.03 PREDIABETES: ICD-10-CM

## 2018-03-01 DIAGNOSIS — E66.9 OBESITY (BMI 30-39.9): ICD-10-CM

## 2018-03-01 PROCEDURE — 99213 OFFICE O/P EST LOW 20 MIN: CPT | Mod: GE | Performed by: INTERNAL MEDICINE

## 2018-03-01 RX ORDER — LEVOTHYROXINE SODIUM 0.12 MG/1
137 TABLET ORAL
Qty: 30 TAB | Refills: 2 | Status: SHIPPED | OUTPATIENT
Start: 2018-03-01 | End: 2018-06-08 | Stop reason: SDUPTHER

## 2018-03-01 NOTE — PATIENT INSTRUCTIONS
Prediabetes Eating Plan  Prediabetes--also called impaired glucose tolerance or impaired fasting glucose--is a condition that causes blood sugar (blood glucose) levels to be higher than normal. Following a healthy diet can help to keep prediabetes under control. It can also help to lower the risk of type 2 diabetes and heart disease, which are increased in people who have prediabetes. Along with regular exercise, a healthy diet:  · Promotes weight loss.  · Helps to control blood sugar levels.  · Helps to improve the way that the body uses insulin.  What do I need to know about this eating plan?  · Use the glycemic index (GI) to plan your meals. The index tells you how quickly a food will raise your blood sugar. Choose low-GI foods. These foods take a longer time to raise blood sugar.  · Pay close attention to the amount of carbohydrates in the food that you eat. Carbohydrates increase blood sugar levels.  · Keep track of how many calories you take in. Eating the right amount of calories will help you to achieve a healthy weight. Losing about 7 percent of your starting weight can help to prevent type 2 diabetes.  · You may want to follow a Mediterranean diet. This diet includes a lot of vegetables, lean meats or fish, whole grains, fruits, and healthy oils and fats.  What foods can I eat?  Grains   Whole grains, such as whole-wheat or whole-grain breads, crackers, cereals, and pasta. Unsweetened oatmeal. Bulgur. Barley. Quinoa. Brown rice. Corn or whole-wheat flour tortillas or taco shells.  Vegetables   Lettuce. Spinach. Peas. Beets. Cauliflower. Cabbage. Broccoli. Carrots. Tomatoes. Squash. Eggplant. Herbs. Peppers. Onions. Cucumbers. [place] sprouts.  Fruits   Berries. Bananas. Apples. Oranges. Grapes. Papaya. Hay. Pomegranate. Kiwi. Grapefruit. Cherries.  Meats and Other Protein Sources   Seafood. Lean meats, such as chicken and turkey or lean cuts of pork and beef. Tofu. Eggs. Nuts. Beans.  Dairy   Low-fat or  fat-free dairy products, such as yogurt, cottage cheese, and cheese.  Beverages   Water. Tea. Coffee. Sugar-free or diet soda. [place] water. Milk. Milk alternatives, such as soy or almond milk.  Condiments   Mustard. Relish. Low-fat, low-sugar ketchup. Low-fat, low-sugar barbecue sauce. Low-fat or fat-free mayonnaise.  Sweets and Desserts   Sugar-free or low-fat pudding. Sugar-free or low-fat ice cream and other frozen treats.  Fats and Oils   Avocado. Walnuts. Olive oil.  The items listed above may not be a complete list of recommended foods or beverages. Contact your dietitian for more options.   What foods are not recommended?  Grains   Refined white flour and flour products, such as bread, pasta, snack foods, and cereals.  Beverages   Sweetened drinks, such as sweet iced tea and soda.  Sweets and Desserts   Baked goods, such as cake, cupcakes, pastries, cookies, and cheesecake.  The items listed above may not be a complete list of foods and beverages to avoid. Contact your dietitian for more information.   This information is not intended to replace advice given to you by your health care provider. Make sure you discuss any questions you have with your health care provider.  Document Released: 05/03/2016 Document Revised: 05/25/2017 Document Reviewed: 01/13/2016  [employer] Interactive Patient Education © 2017 [employer]  Prediabetes  Prediabetes is the condition of having a blood sugar (blood glucose) level that is higher than it should be, but not high enough for you to be diagnosed with type 2 diabetes. Having prediabetes puts you at risk for developing type 2 diabetes (type 2 diabetes mellitus). Prediabetes may be called impaired glucose tolerance or impaired fasting glucose.  Prediabetes usually does not cause symptoms. Your health care provider can diagnose this condition with blood tests. You may be tested for prediabetes if you are overweight and if you have at least one other risk factor for  prediabetes.  Risk factors for prediabetes include:  · Having a family member with type 2 diabetes.  · Being overweight or obese.  · Being older than age 45.  · Being of American-[place], -American, /, or / descent.  · Having an inactive (sedentary) lifestyle.  · Having a history of gestational diabetes or polycystic ovarian syndrome (PCOS).  · Having low levels of good cholesterol (HDL-C) or high levels of blood fats (triglycerides).  · Having high blood pressure.  What is blood glucose and how is blood glucose measured?     Blood glucose refers to the amount of glucose in your bloodstream. Glucose comes from eating foods that contain sugars and starches (carbohydrates) that the body breaks down into glucose. Your blood glucose level may be measured in mg/dL (milligrams per deciliter) or mmol/L (millimoles per liter). Your blood glucose may be checked with one or more of the following blood tests:  · A fasting blood glucose (FBG) test. You will not be allowed to eat (you will fast) for at least 8 hours before a blood sample is taken.  ¨ A normal range for FBG is  mg/dl (3.9-5.6 mmol/L).  · An A1c (hemoglobin A1c) blood test. This test provides information about blood glucose control over the previous 2?3 months.  · An oral glucose tolerance test (OGTT). This test measures your blood glucose twice:  ¨ After fasting. This is your baseline level.  ¨ Two hours after you drink a beverage that contains glucose.  You may be diagnosed with prediabetes:  · If your FBG is 100?125 mg/dL (5.6-6.9 mmol/L).  · If your A1c level is 5.7?6.4%.  · If your OGGT result is 140?199 mg/dL (7.8-11 mmol/L).  These blood tests may be repeated to confirm your diagnosis.  What happens if blood glucose is too high?  The pancreas produces a hormone (insulin) that helps move glucose from the bloodstream into cells. When cells in the body do not respond properly to insulin that the body makes  (insulin resistance), excess glucose builds up in the blood instead of going into cells. As a result, high blood glucose (hyperglycemia) can develop, which can cause many complications. This is a symptom of prediabetes.  What can happen if blood glucose stays higher than normal for a long time?  Having high blood glucose for a long time is dangerous. Too much glucose in your blood can damage your nerves and blood vessels. Long-term damage can lead to complications from diabetes, which may include:  · Heart disease.  · Stroke.  · Blindness.  · Kidney disease.  · Depression.  · Poor circulation in the feet and legs, which could lead to surgical removal (amputation) in severe cases.  How can prediabetes be prevented from turning into type 2 diabetes?     To help prevent type 2 diabetes, take the following actions:  · Be physically active.  ¨ Do moderate-intensity physical activity for at least 30 minutes on at least 5 days of the week, or as much as told by your health care provider. This could be brisk walking, biking, or water aerobics.  ¨ Ask your health care provider what activities are safe for you. A mix of physical activities may be best, such as walking, swimming, cycling, and strength training.  · Lose weight as told by your health care provider.  ¨ Losing 5-7% of your body weight can reverse insulin resistance.  ¨ Your health care provider can determine how much weight loss is best for you and can help you lose weight safely.  · Follow a healthy meal plan. This includes eating lean proteins, complex carbohydrates, fresh fruits and vegetables, low-fat dairy products, and healthy fats.  ¨ Follow instructions from your health care provider about eating or drinking restrictions.  ¨ Make an appointment to see a diet and nutrition specialist (registered dietitian) to help you create a healthy eating plan that is right for you.  · Do not smoke or use any tobacco products, such as cigarettes, chewing tobacco, and  e-cigarettes. If you need help quitting, ask your health care provider.  · Take over-the-counter and prescription medicines as told by your health care provider. You may be prescribed medicines that help lower the risk of type 2 diabetes.  This information is not intended to replace advice given to you by your health care provider. Make sure you discuss any questions you have with your health care provider.  Document Released: 04/10/2017 Document Revised: 05/25/2017 Document Reviewed: 02/07/2017  [employer] Interactive Patient Education © 2017 [employer]  Hypothyroidism  Hypothyroidism is a disorder of the thyroid. The thyroid is a large gland that is located in the lower front of the neck. The thyroid releases hormones that control how the body works. With hypothyroidism, the thyroid does not make enough of these hormones.  What are the causes?  Causes of hypothyroidism may include:  · Viral infections.  · Pregnancy.  · Your own defense system (immune system) attacking your thyroid.  · Certain medicines.  · Birth defects.  · Past radiation treatments to your head or neck.  · Past treatment with radioactive iodine.  · Past surgical removal of part or all of your thyroid.  · Problems with the gland that is located in the center of your brain (pituitary).  What are the signs or symptoms?  Signs and symptoms of hypothyroidism may include:  · Feeling as though you have no energy (lethargy).  · Inability to tolerate cold.  · Weight gain that is not explained by a change in diet or exercise habits.  · Dry skin.  · Coarse hair.  · Menstrual irregularity.  · Slowing of thought processes.  · Constipation.  · Sadness or depression.  How is this diagnosed?  Your health care provider may diagnose hypothyroidism with blood tests and ultrasound tests.  How is this treated?  Hypothyroidism is treated with medicine that replaces the hormones that your body does not make. After you begin treatment, it may take several weeks for  symptoms to go away.  Follow these instructions at home:  · Take medicines only as directed by your health care provider.  · If you start taking any new medicines, tell your health care provider.  · Keep all follow-up visits as directed by your health care provider. This is important. As your condition improves, your dosage needs may change. You will need to have blood tests regularly so that your health care provider can watch your condition.  Contact a health care provider if:  · Your symptoms do not get better with treatment.  · You are taking thyroid replacement medicine and:  ¨ You sweat excessively.  ¨ You have tremors.  ¨ You feel anxious.  ¨ You lose weight rapidly.  ¨ You cannot tolerate heat.  ¨ You have emotional swings.  ¨ You have diarrhea.  ¨ You feel weak.  Get help right away if:  · You develop chest pain.  · You develop an irregular heartbeat.  · You develop a rapid heartbeat.  This information is not intended to replace advice given to you by your health care provider. Make sure you discuss any questions you have with your health care provider.  Document Released: 12/18/2006 Document Revised: 05/25/2017 Document Reviewed: 05/05/2015  [employer] Interactive Patient Education © 2017 [employer]

## 2018-03-02 NOTE — PROGRESS NOTES
Established Patient    Luann presents today with the following:    CC: Follow-up on the blood pressure    HPI: 52-year-old female patient with a past medical history of hypertension on antihypertensive medication, thyroid cancer status post thyroidectomy 1999, recent H. pylori infection and GERD comes in to follow-up to review labs and her chronic conditions.     #1 Hypertension: Patient was on lisinopril 10 mg and complained feeling dizzy whenever she gets up suddenly in the last visit. Patient's previous blood pressure monitoring log with readings systolic blood pressure in 90s and diastolic blood pressure ranging in the 60s. The last visit lisinopril dose was reduced from 10 mg to 5 mg by mouth once daily. Denies chest pain, palpitations, shortness of breath, blurry vision or problems with urination. Blood pressure today is 97 /71 on current dose of lisinopril 5 mg by mouth once daily.     #2 Postoperative hypothyroidism: Patient was diagnosed with thyroid cancer in the remote past and undergone thyroidectomy in 1999 since then has been on thyroid replacement with levothyroxine. Currently on levothyroxine 137mcg by mouth once daily. Her recent TSH level on 1/11/18 showed up in 0.160. Her previous PCP increased her levothyroxine dose from 125mcg to 137mcg by mouth once daily.  Denies any related symptoms.     Otherwise patient does not have any other complaints today.    Patient Active Problem List    Diagnosis Date Noted   • Morbid obesity with BMI of 40.0-44.9, adult ([facility]) 01/04/2018   • Left wrist pain 08/02/2017   • Lunotriquetral ligament tear 08/02/2017   • Palpitations 07/26/2016   • Essential hypertension    • Postoperative hypothyroidism    • Large breasts    • Perimenopausal    • Unspecified disorder of menstruation and other abnormal bleeding from female genital tract 04/11/2014       Current Outpatient Prescriptions   Medication Sig Dispense Refill   • levothyroxine (SYNTHROID) 125 MCG Tab Take 1 Tab  by mouth Every morning on an empty stomach. 30 Tab 2   • therapeutic multivitamin-minerals (THERAGRAN-M) Tab Take 1 Tab by mouth every day.     • Glucosamine-Chondroitin (MOVE FREE PO) Take 2 Tabs by mouth every morning.     • Probiotic Product (PROBIOTIC DAILY PO) Take  by mouth.     • omeprazole (PRILOSEC) 20 MG CPDR Take 40 mg by mouth every morning.       No current facility-administered medications for this visit.        Social History     Social History   • Marital status:      Spouse name: N/A   • Number of children: N/A   • Years of education: N/A     Occupational History   • Not on file.     Social History Main Topics   • Smoking status: Former Smoker     Packs/day: 0.50     Years: 5.00     Types: Cigarettes     Quit date: 1/1/1996   • Smokeless tobacco: Never Used      Comment: 1995   • Alcohol use 0.0 oz/week      Comment: 5/week   • Drug use: No   • Sexual activity: Not on file     Other Topics Concern   • Not on file     Social History Narrative   • No narrative on file       Family History   Problem Relation Age of Onset   • Hypertension Mother    • Heart Attack Father    • Heart Disease Father    • Alzheimer's Disease Father    • Suicide Attempts Brother    • Diabetes Maternal Grandmother    • Colon Cancer Maternal Grandfather        ROS: As per HPI. Additional pertinent symptoms as noted below.    Constitutional: Denies fever/chills/weight changes.   Eyes: Denies changes/pain in vision  ENT: Denies sore throat/congestion/ear ache.   Cardiovascular: Denies chest pain /palpitations/edema.   Respiratory: Denies SOB/cough/PND/orthopnea  Abdomen: Denies difficulty swallowing/ diarrhea/constipation/abdominal pain/nausea/vomiting  Genitourinary: Normal urinary habits.   Musculo-skeletal: normal ambulation.Denies muscle or joint pains.  Skin: Denies rash/lesions.  Neurological: Denies weakness/tingling/numbness/headache  Psychological: good mood and cooperative. Denies anxiety /depression       BP (!)  "97/71   Pulse 89   Temp 36.6 °C (97.9 °F)   Ht 1.626 m (5' 4\")   Wt 104.3 kg (230 lb)   LMP 03/30/2014   SpO2 97%   BMI 39.48 kg/m²     Physical Exam  General:  Alert and oriented, No apparent distress.    Eyes: Pupils equal and reactive. No scleral icterus.    Throat: Clear no erythema or exudates noted.    Neck: Supple. No lymphadenopathy noted. Thyroid not enlarged.    Lungs: Clear to auscultation and percussion bilaterally.    Cardiovascular: Regular rate and rhythm. No murmurs, rubs or gallops.    Abdomen:  Obese .Benign. No rebound or guarding noted.    Extremities: No clubbing, cyanosis, edema.    Skin: Clear. No rash or suspicious skin lesions noted.    Neurological: Oriented to time, place, and person .Cranial nerves intact. No motor/sensory deficits.Reflexes were normal and symmetrical in both upper and lower extremities     Musculoskeletal : NROM of all extremities. No tenderness or deformity noted.     Note: I have reviewed all pertinent labs and diagnostic tests associated with this visit with specific comments listed under the assessment and plan below    Assessment and Plan    1. Postoperative hypothyroidism  -Has a remote history of thyroid cancer and undergone surgery in 1999 and since then has been on levothyroxine.  -Her recent TSH level in January showed 0.16 and her previous PCP increased her dose of levothyroxine from 125mcg to 137mcg by mouth once daily and patient is advised to continue the same dosage and refill today.  -Will follow up with repeat TSH level and free T4 in 3 months   -currently denies any related symptoms and blood pressure being low this visit, advised to stop lisinopril and monitor the blood pressure at home.    2. Obesity (BMI 30-39.9)   -Advised to eat healthy-DASH diet  -Encouraged to exercise regularly at least 30 minutes daily for 5 days a week    3. Prediabetes  -Recent HbA1c 6.3 on the labs done on 24 February 2018 compared to 6.1 in September 2017.  -Denies " any related symptoms  -Advised to follow lifestyle modification-weight loss, healthy diet, exercise regularly at least 30 minutes daily for 5 days a week    4. Health care maintenance  - patient did not had flu vaccination this season  - had tetanus vaccination in 2015  - has upcoming first colonoscopy for screening colorectal cancer in march 2018  - had mammogram last year when she had breast reduction surgery December 2016 and due for one this year and ordered last visit.  - Had Pap smear around 2-3 years back and due for one. Ordered referral to GYN for Pap smear.    Follow up: Return in 3 months or early if any acute issues.    Signed by: Christiana Santos M.D.

## 2018-04-21 ENCOUNTER — HOSPITAL ENCOUNTER (OUTPATIENT)
Dept: LAB | Facility: MEDICAL CENTER | Age: 53
End: 2018-04-21
Attending: INTERNAL MEDICINE
Payer: COMMERCIAL

## 2018-04-21 LAB
ALBUMIN SERPL BCP-MCNC: 3.7 G/DL (ref 3.2–4.9)
ALBUMIN/GLOB SERPL: 1 G/DL
ALP SERPL-CCNC: 144 U/L (ref 30–99)
ALT SERPL-CCNC: 24 U/L (ref 2–50)
ANION GAP SERPL CALC-SCNC: 9 MMOL/L (ref 0–11.9)
AST SERPL-CCNC: 23 U/L (ref 12–45)
BASOPHILS # BLD AUTO: 0.7 % (ref 0–1.8)
BASOPHILS # BLD: 0.04 K/UL (ref 0–0.12)
BILIRUB SERPL-MCNC: 0.4 MG/DL (ref 0.1–1.5)
BUN SERPL-MCNC: 20 MG/DL (ref 8–22)
CALCIUM SERPL-MCNC: 9 MG/DL (ref 8.5–10.5)
CHLORIDE SERPL-SCNC: 106 MMOL/L (ref 96–112)
CO2 SERPL-SCNC: 24 MMOL/L (ref 20–33)
CREAT SERPL-MCNC: 0.87 MG/DL (ref 0.5–1.4)
EOSINOPHIL # BLD AUTO: 0.33 K/UL (ref 0–0.51)
EOSINOPHIL NFR BLD: 5.7 % (ref 0–6.9)
ERYTHROCYTE [DISTWIDTH] IN BLOOD BY AUTOMATED COUNT: 48.1 FL (ref 35.9–50)
FERRITIN SERPL-MCNC: 8.4 NG/ML (ref 10–291)
GLOBULIN SER CALC-MCNC: 3.7 G/DL (ref 1.9–3.5)
GLUCOSE SERPL-MCNC: 88 MG/DL (ref 65–99)
HCT VFR BLD AUTO: 39.9 % (ref 37–47)
HGB BLD-MCNC: 12.8 G/DL (ref 12–16)
IMM GRANULOCYTES # BLD AUTO: 0.07 K/UL (ref 0–0.11)
IMM GRANULOCYTES NFR BLD AUTO: 1.2 % (ref 0–0.9)
IRON SATN MFR SERPL: 12 % (ref 15–55)
IRON SERPL-MCNC: 49 UG/DL (ref 40–170)
LYMPHOCYTES # BLD AUTO: 1.29 K/UL (ref 1–4.8)
LYMPHOCYTES NFR BLD: 22.1 % (ref 22–41)
MCH RBC QN AUTO: 27.9 PG (ref 27–33)
MCHC RBC AUTO-ENTMCNC: 32.1 G/DL (ref 33.6–35)
MCV RBC AUTO: 87.1 FL (ref 81.4–97.8)
MONOCYTES # BLD AUTO: 0.36 K/UL (ref 0–0.85)
MONOCYTES NFR BLD AUTO: 6.2 % (ref 0–13.4)
NEUTROPHILS # BLD AUTO: 3.74 K/UL (ref 2–7.15)
NEUTROPHILS NFR BLD: 64.1 % (ref 44–72)
NRBC # BLD AUTO: 0 K/UL
NRBC BLD-RTO: 0 /100 WBC
PLATELET # BLD AUTO: 279 K/UL (ref 164–446)
PMV BLD AUTO: 10 FL (ref 9–12.9)
POTASSIUM SERPL-SCNC: 4.2 MMOL/L (ref 3.6–5.5)
PROT SERPL-MCNC: 7.4 G/DL (ref 6–8.2)
RBC # BLD AUTO: 4.58 M/UL (ref 4.2–5.4)
SODIUM SERPL-SCNC: 139 MMOL/L (ref 135–145)
TIBC SERPL-MCNC: 414 UG/DL (ref 250–450)
WBC # BLD AUTO: 5.8 K/UL (ref 4.8–10.8)

## 2018-04-21 PROCEDURE — 36415 COLL VENOUS BLD VENIPUNCTURE: CPT

## 2018-04-21 PROCEDURE — 86038 ANTINUCLEAR ANTIBODIES: CPT

## 2018-04-21 PROCEDURE — 83550 IRON BINDING TEST: CPT

## 2018-04-21 PROCEDURE — 86225 DNA ANTIBODY NATIVE: CPT

## 2018-04-21 PROCEDURE — 82728 ASSAY OF FERRITIN: CPT

## 2018-04-21 PROCEDURE — 86255 FLUORESCENT ANTIBODY SCREEN: CPT

## 2018-04-21 PROCEDURE — 83516 IMMUNOASSAY NONANTIBODY: CPT

## 2018-04-21 PROCEDURE — 83540 ASSAY OF IRON: CPT

## 2018-04-21 PROCEDURE — 85025 COMPLETE CBC W/AUTO DIFF WBC: CPT

## 2018-04-21 PROCEDURE — 82103 ALPHA-1-ANTITRYPSIN TOTAL: CPT

## 2018-04-21 PROCEDURE — 80053 COMPREHEN METABOLIC PANEL: CPT

## 2018-04-21 PROCEDURE — 86235 NUCLEAR ANTIGEN ANTIBODY: CPT

## 2018-04-24 LAB
A1AT SERPL-MCNC: 94 MG/DL (ref 90–200)
MITOCHONDRIA M2 IGG SER-ACNC: 3.4 UNITS (ref 0–20)
NUCLEAR IGG SER QL IA: NORMAL
SMA IGG SER-ACNC: 5 UNITS (ref 0–19)

## 2018-05-03 ENCOUNTER — OFFICE VISIT (OUTPATIENT)
Dept: URGENT CARE | Facility: PHYSICIAN GROUP | Age: 53
End: 2018-05-03
Payer: COMMERCIAL

## 2018-05-03 VITALS
WEIGHT: 231 LBS | SYSTOLIC BLOOD PRESSURE: 128 MMHG | OXYGEN SATURATION: 97 % | TEMPERATURE: 97.6 F | RESPIRATION RATE: 12 BRPM | HEIGHT: 64 IN | HEART RATE: 71 BPM | BODY MASS INDEX: 39.44 KG/M2 | DIASTOLIC BLOOD PRESSURE: 78 MMHG

## 2018-05-03 DIAGNOSIS — J01.41 ACUTE RECURRENT PANSINUSITIS: ICD-10-CM

## 2018-05-03 PROCEDURE — 99214 OFFICE O/P EST MOD 30 MIN: CPT | Performed by: FAMILY MEDICINE

## 2018-05-03 RX ORDER — AMOXICILLIN AND CLAVULANATE POTASSIUM 875; 125 MG/1; MG/1
1 TABLET, FILM COATED ORAL 2 TIMES DAILY
Qty: 20 TAB | Refills: 0 | Status: SHIPPED | OUTPATIENT
Start: 2018-05-03 | End: 2018-05-13

## 2018-05-03 ASSESSMENT — ENCOUNTER SYMPTOMS
MYALGIAS: 0
EYE DISCHARGE: 0
EYE REDNESS: 0
WEIGHT LOSS: 0

## 2018-05-03 ASSESSMENT — PAIN SCALES - GENERAL: PAINLEVEL: 5=MODERATE PAIN

## 2018-05-03 NOTE — PROGRESS NOTES
"Subjective:      Luann Morales is a 52 y.o. female who presents with Nasal Congestion (Rt facial pressure, nausea, dizziness, yellow mucus x2weeks )            2 weeks right frontal and maxillary sinus pressure/drainage. Drainage is giving he nausea. Subjective fever. +PMH sinusitis/no sinus surgery. OTC mucinex without relief. No ST. No cough. No other aggravating or alleviating factors.          Review of Systems   Constitutional: Negative for malaise/fatigue and weight loss.   HENT: Positive for ear pain. Negative for ear discharge.    Eyes: Negative for discharge and redness.   Musculoskeletal: Negative for joint pain and myalgias.   Skin: Negative for itching and rash.     .  Medications, Allergies, and current problem list reviewed today in Epic       Objective:     /78   Pulse 71   Temp 36.4 °C (97.6 °F)   Resp 12   Ht 1.626 m (5' 4\")   Wt 104.8 kg (231 lb)   LMP 03/30/2014   SpO2 97%   BMI 39.65 kg/m²      Physical Exam   Constitutional: She appears well-developed and well-nourished. No distress.   HENT:   Head: Normocephalic.   Right Ear: External ear normal.   Left Ear: External ear normal.   Tender right frontal and maxillary sinus tenderness. +PND   Eyes: Conjunctivae are normal.   Neck: Neck supple.   Cardiovascular: Normal rate, regular rhythm and normal heart sounds.    Pulmonary/Chest: Effort normal and breath sounds normal. She has no wheezes.   Neurological:   Speech is clear. Patient is appropriate and cooperative.     Skin: Skin is warm and dry. No rash noted.               Assessment/Plan:     1. Acute recurrent pansinusitis  amoxicillin-clavulanate (AUGMENTIN) 875-125 MG Tab     Differential diagnosis, natural history, supportive care, and indications for immediate follow-up discussed at length.     Nasal saline  Has never responded to nasal CS  "

## 2018-05-16 ENCOUNTER — OFFICE VISIT (OUTPATIENT)
Dept: INTERNAL MEDICINE | Facility: MEDICAL CENTER | Age: 53
End: 2018-05-16
Payer: COMMERCIAL

## 2018-05-16 VITALS
HEIGHT: 64 IN | WEIGHT: 234.6 LBS | OXYGEN SATURATION: 97 % | RESPIRATION RATE: 20 BRPM | TEMPERATURE: 97.9 F | DIASTOLIC BLOOD PRESSURE: 80 MMHG | SYSTOLIC BLOOD PRESSURE: 132 MMHG | BODY MASS INDEX: 40.05 KG/M2 | HEART RATE: 91 BPM

## 2018-05-16 DIAGNOSIS — J34.89 SINUS PAIN: ICD-10-CM

## 2018-05-16 DIAGNOSIS — I10 ESSENTIAL HYPERTENSION: Chronic | ICD-10-CM

## 2018-05-16 DIAGNOSIS — E89.0 POSTOPERATIVE HYPOTHYROIDISM: ICD-10-CM

## 2018-05-16 DIAGNOSIS — D50.9 IRON DEFICIENCY ANEMIA, UNSPECIFIED IRON DEFICIENCY ANEMIA TYPE: ICD-10-CM

## 2018-05-16 PROCEDURE — 99213 OFFICE O/P EST LOW 20 MIN: CPT | Mod: GE | Performed by: INTERNAL MEDICINE

## 2018-05-16 RX ORDER — FLUTICASONE PROPIONATE 50 MCG
2 SPRAY, SUSPENSION (ML) NASAL DAILY
Qty: 16 G | Refills: 1 | Status: SHIPPED | OUTPATIENT
Start: 2018-05-16 | End: 2021-10-07

## 2018-05-16 RX ORDER — OMEGA-3 FATTY ACIDS/FISH OIL 300-1000MG
1 CAPSULE ORAL 3 TIMES DAILY
Qty: 20 CAP | Refills: 0 | Status: SHIPPED | OUTPATIENT
Start: 2018-05-16 | End: 2021-10-07

## 2018-05-16 RX ORDER — FEXOFENADINE HCL 60 MG/1
60 TABLET, FILM COATED ORAL DAILY
Qty: 15 TAB | Refills: 0 | Status: SHIPPED | OUTPATIENT
Start: 2018-05-16 | End: 2021-10-07

## 2018-05-16 ASSESSMENT — PAIN SCALES - GENERAL: PAINLEVEL: NO PAIN

## 2018-05-16 NOTE — PATIENT INSTRUCTIONS
Follow-up with your PCP in 2 weeks  Please follow with ENT  perform Epley maneuver at home    Epley M  Epley Maneuver Self-Care  WHAT IS THE EPLEY MANEUVER?  The Epley maneuver is an exercise you can do to relieve symptoms of benign paroxysmal positional vertigo (BPPV). This condition is often just referred to as vertigo. BPPV is caused by the movement of tiny crystals (canaliths) inside your inner ear. The accumulation and movement of canaliths in your inner ear causes a sudden spinning sensation (vertigo) when you move your head to certain positions. Vertigo usually lasts about 30 seconds. BPPV usually occurs in just one ear. If you get vertigo when you lie on your left side, you probably have BPPV in your left ear. Your health care provider can tell you which ear is involved.   BPPV may be caused by a head injury. Many people older than 50 get BPPV for unknown reasons. If you have been diagnosed with BPPV, your health care provider may teach you how to do this maneuver. BPPV is not life threatening (benign) and usually goes away in time.   WHEN SHOULD I PERFORM THE EPLEY MANEUVER?  You can do this maneuver at home whenever you have symptoms of vertigo. You may do the Epley maneuver up to 3 times a day until your symptoms of vertigo go away.  HOW SHOULD I DO THE EPLEY MANEUVER?  1. Sit on the edge of a bed or table with your back straight. Your legs should be extended or hanging over the edge of the bed or table.    2. Turn your head skilled nursing toward the affected ear.    3. Lie backward quickly with your head turned until you are lying flat on your back. You may want to position a pillow under your shoulders.    4. Hold this position for 30 seconds. You may experience an attack of vertigo. This is normal. Hold this position until the vertigo stops.  5. Then turn your head to the opposite direction until your unaffected ear is facing the floor.    6. Hold this position for 30 seconds. You may experience an attack of  vertigo. This is normal. Hold this position until the vertigo stops.  7. Now turn your whole body to the same side as your head. Hold for another 30 seconds.    8. You can then sit back up.  ARE THERE RISKS TO THIS MANEUVER?  In some cases, you may have other symptoms (such as changes in your vision, weakness, or numbness). If you have these symptoms, stop doing the maneuver and call your health care provider. Even if doing these maneuvers relieves your vertigo, you may still have dizziness. Dizziness is the sensation of light-headedness but without the sensation of movement. Even though the Epley maneuver may relieve your vertigo, it is possible that your symptoms will return within 5 years.  WHAT SHOULD I DO AFTER THIS MANEUVER?  After doing the Epley maneuver, you can return to your normal activities. Ask your doctor if there is anything you should do at home to prevent vertigo. This may include:  · Sleeping with two or more pillows to keep your head elevated.  · Not sleeping on the side of your affected ear.  · Getting up slowly from bed.  · Avoiding sudden movements during the day.  · Avoiding extreme head movement, like looking up or bending over.  · Wearing a cervical collar to prevent sudden head movements.  WHAT SHOULD I DO IF MY SYMPTOMS GET WORSE?  Call your health care provider if your vertigo gets worse. Call your provider right way if you have other symptoms, including:   · Nausea.  · Vomiting.  · Headache.  · Weakness.  · Numbness.  · Vision changes.     This information is not intended to replace advice given to you by your health care provider. Make sure you discuss any questions you have with your health care provider.     Document Released: 12/23/2014 Document Reviewed: 12/23/2014  [employer] Interactive Patient Education ©2016 [employer]

## 2018-05-16 NOTE — PROGRESS NOTES
Established Patient    Luann presents today with the following:    CC: Sinus pain    HPI:     52-year-old female with past medical history of obesity, iron deficiency anemia, hypertension, hypothyroidism came in for follow-up. Patient is having an episode of maxillary sinusitis which started at the end of April. She has nasal congestion and yellow mucus. She also had postnasal drip. She denies sore throat or difficulty swallowing. She was seen in the urgent care on 05/03 and was given 10 days of Augmentin and nasal saline. She had noticed some improvement but continues to have sinus pain, yellow mucus and postnasal drip. She has long-standing history of recurrent sinusitis 2-3 episodes per year and has never been evaluated by ENT. She also endorses seasonal allergies. She has recent diagnosis of iron deficiency anemia and was evaluated by gastroenterology.       Patient Active Problem List    Diagnosis Date Noted   • Sinus pain 05/16/2018   • Morbid obesity with BMI of 40.0-44.9, adult ([facility]) 01/04/2018   • Left wrist pain 08/02/2017   • Lunotriquetral ligament tear 08/02/2017   • Palpitations 07/26/2016   • Essential hypertension    • Postoperative hypothyroidism    • Large breasts    • Perimenopausal    • Unspecified disorder of menstruation and other abnormal bleeding from female genital tract 04/11/2014       Current Outpatient Prescriptions   Medication Sig Dispense Refill   • levothyroxine (SYNTHROID) 125 MCG Tab Take 1 Tab by mouth Every morning on an empty stomach. 30 Tab 2   • therapeutic multivitamin-minerals (THERAGRAN-M) Tab Take 1 Tab by mouth every day.     • Probiotic Product (PROBIOTIC DAILY PO) Take  by mouth.     • Glucosamine-Chondroitin (MOVE FREE PO) Take 2 Tabs by mouth every morning.     • omeprazole (PRILOSEC) 20 MG CPDR Take 40 mg by mouth every morning.       No current facility-administered medications for this visit.        ROS: As per HPI. Additional pertinent symptoms as noted  "below.    GI: Denies nausea vomiting:  HEENT: Positive for postnasal drip, yellow mucus, sinus tenderness   Genitourinary: Denies dysuria, hematuria, suprapubic discomfort  Neurological: Denies any focal weakness  Musculoskeletal: Denies swelling  Neuro: Positive for vertigo  Psychiatric: Denies any suicidal or homicidal ideation    /80   Pulse 91   Temp 36.6 °C (97.9 °F)   Resp 20   Ht 1.626 m (5' 4\")   Wt 106.4 kg (234 lb 9.6 oz)   LMP 03/30/2014   SpO2 97%   Breastfeeding? No   BMI 40.27 kg/m²     Physical Exam   Constitutional:  oriented to person, place, and time. No distress.   Eyes: Pupils are equal, round, and reactive to light. No scleral icterus.  Neck: Neck supple. No thyromegaly present.   Tender anterior cervical lymphadenopathy, maxillary sinus tenderness,  Cardiovascular: Normal rate, regular rhythm and normal heart sounds.  Exam reveals no gallop and no friction rub.  No murmur heard.  Pulmonary/Chest: Breath sounds normal. Chest wall is not dull to percussion.   Musculoskeletal:   no edema.   Lymphadenopathy: no cervical adenopathy  Neurological: alert and oriented to person, place, and time.   Skin: No cyanosis. Nails show no clubbing.    Note: I have reviewed all pertinent labs and diagnostic tests associated with this visit with specific comments listed under the assessment and plan below    Assessment and Plan        Sinus pain  Recurrent sinusitis  Overall symptoms have improved but continues to have postnasal drip, yellow mucus and sinus tenderness  Afebrile, clear auscultation  Completed 10 days of antibiotics  Complains of vertigo  Plan  Will give ibuprofen 200 mg 3 times a day  Will recommend Allegra daily  Will prescribe Flonase  Educated and provide instruction about Epley Maneuver  Referral to ENT because of long-standing history and multiple episodes per year  Will get x-ray PNS      Iron deficiency anemia  Serum ferritin level is 8.4, hemoglobin 12.8  On omeprazole: " Currently not on any iron supplement  In process to establish with the gastroenterology  Plan  Follow-up with PCP and consider starting iron supplements   follow-up with gastroenterology       Postoperative hypothyroidism  Continue levothyroxine without any dose change      Followup: No Follow-up on file.      Signed by: Lewis Singer M.D.

## 2018-06-11 RX ORDER — LEVOTHYROXINE SODIUM 0.12 MG/1
TABLET ORAL
Qty: 90 TAB | Refills: 0 | Status: SHIPPED | OUTPATIENT
Start: 2018-06-11 | End: 2018-09-04 | Stop reason: SDUPTHER

## 2018-06-11 NOTE — TELEPHONE ENCOUNTER
Last seen: 05/16/18 by Dr. Singer  Next appt: None     Was the patient seen in the last year in this department? Yes   Does patient have an active prescription for medications requested? No   Received Request Via: Pharmacy

## 2018-09-04 ENCOUNTER — PATIENT MESSAGE (OUTPATIENT)
Dept: INTERNAL MEDICINE | Facility: MEDICAL CENTER | Age: 53
End: 2018-09-04

## 2018-09-04 DIAGNOSIS — E89.0 POSTOPERATIVE HYPOTHYROIDISM: ICD-10-CM

## 2018-09-04 RX ORDER — LEVOTHYROXINE SODIUM 0.12 MG/1
125 TABLET ORAL
Qty: 90 TAB | Refills: 0 | Status: SHIPPED | OUTPATIENT
Start: 2018-09-04 | End: 2018-10-19 | Stop reason: SDUPTHER

## 2018-09-04 NOTE — TELEPHONE ENCOUNTER
From: Luann Morales  Sent: 9/4/2018 6:52 AM PDT  Subject: Medication Renewal Request    Luann Morales would like a refill of the following medications:     levothyroxine (SYNTHROID) 125 MCG Tab [Christiana Santos M.D.]    [facility]erred pharmacy: [facility] [address] [place] [address] [address] [facility]

## 2018-09-04 NOTE — TELEPHONE ENCOUNTER
From: Luann Moraels  To: Christiana Santos M.D.  Sent: 9/4/2018 6:59 AM PDT  Subject: Prescription Question    Hi Dr. Santos,  I know that I need to get blood work done for my thyroid but I just started a new job and I need to get a new blood work slip and I need my medication refilled just so I dont run out. And if you can mail it to me I would appreciate it. And I will set up an appointment with you soon just waiting on my new insurance cards.  Thank you  Luann Morales

## 2018-09-04 NOTE — TELEPHONE ENCOUNTER
Last seen: 05/16/18 by Dr. Singer  Next appt: None     Was the patient seen in the last year in this department? Yes   Does patient have an active prescription for medications requested? No   Received Request Via: Patient

## 2018-10-06 ENCOUNTER — HOSPITAL ENCOUNTER (OUTPATIENT)
Dept: LAB | Facility: MEDICAL CENTER | Age: 53
End: 2018-10-06
Attending: INTERNAL MEDICINE
Payer: COMMERCIAL

## 2018-10-06 DIAGNOSIS — E89.0 POSTOPERATIVE HYPOTHYROIDISM: ICD-10-CM

## 2018-10-06 LAB — TSH SERPL DL<=0.005 MIU/L-ACNC: 0.87 UIU/ML (ref 0.38–5.33)

## 2018-10-06 PROCEDURE — 84443 ASSAY THYROID STIM HORMONE: CPT

## 2018-10-06 PROCEDURE — 36415 COLL VENOUS BLD VENIPUNCTURE: CPT

## 2018-10-19 ENCOUNTER — OFFICE VISIT (OUTPATIENT)
Dept: INTERNAL MEDICINE | Facility: MEDICAL CENTER | Age: 53
End: 2018-10-19
Payer: COMMERCIAL

## 2018-10-19 VITALS
RESPIRATION RATE: 20 BRPM | DIASTOLIC BLOOD PRESSURE: 90 MMHG | OXYGEN SATURATION: 96 % | HEIGHT: 64 IN | SYSTOLIC BLOOD PRESSURE: 150 MMHG | TEMPERATURE: 99.4 F | BODY MASS INDEX: 40.46 KG/M2 | WEIGHT: 237 LBS | HEART RATE: 86 BPM

## 2018-10-19 DIAGNOSIS — E89.0 POSTOPERATIVE HYPOTHYROIDISM: ICD-10-CM

## 2018-10-19 DIAGNOSIS — Z82.61 FAMILY HISTORY OF RHEUMATOID ARTHRITIS: ICD-10-CM

## 2018-10-19 DIAGNOSIS — R09.81 SINUS CONGESTION: ICD-10-CM

## 2018-10-19 DIAGNOSIS — Z12.31 SCREENING MAMMOGRAM, ENCOUNTER FOR: ICD-10-CM

## 2018-10-19 DIAGNOSIS — M25.541 ARTHRALGIA OF RIGHT HAND: ICD-10-CM

## 2018-10-19 PROCEDURE — 99214 OFFICE O/P EST MOD 30 MIN: CPT | Mod: GC | Performed by: INTERNAL MEDICINE

## 2018-10-19 RX ORDER — LEVOTHYROXINE SODIUM 0.12 MG/1
TABLET ORAL
Qty: 90 TAB | Refills: 3 | Status: SHIPPED | OUTPATIENT
Start: 2018-10-19 | End: 2022-11-16 | Stop reason: SDUPTHER

## 2018-10-19 RX ORDER — GUAIFENESIN 200 MG/1
200 TABLET ORAL 2 TIMES DAILY PRN
Qty: 60 TAB | Refills: 0 | Status: SHIPPED | OUTPATIENT
Start: 2018-10-19 | End: 2019-02-01

## 2018-10-19 ASSESSMENT — ENCOUNTER SYMPTOMS
WEIGHT LOSS: 0
FEVER: 0
BACK PAIN: 0
FOCAL WEAKNESS: 0
NAUSEA: 0
WEAKNESS: 0
MYALGIAS: 0
BLURRED VISION: 0
DIZZINESS: 0
PALPITATIONS: 0
SENSORY CHANGE: 0
HEADACHES: 0
CHILLS: 0
SHORTNESS OF BREATH: 0
ABDOMINAL PAIN: 0
COUGH: 0

## 2018-10-19 ASSESSMENT — PAIN SCALES - GENERAL: PAINLEVEL: 6=MODERATE PAIN

## 2018-10-19 NOTE — PROGRESS NOTES
Established Patient    Luann presents today with the following:    CC: Joint pain    HPI: Patient is a pleasant 53-year-old lady who is here complaining of new onset joint pain that started 2 weeks ago.  This joint pain is worse in her right first metacarpal joint, and third distal interphalangeal joint that triggers in the morning periodically.  She states she has increased stiffness and pain for more than an hour in the morning, but pain is steady throughout the day.  She works packing boxes all day.  She does have a family history of rheumatoid arthritis in her mom, aunt and grandma.    Patient Active Problem List    Diagnosis Date Noted   • Sinus pain 05/16/2018   • Iron deficiency anemia 05/16/2018   • Morbid obesity with BMI of 40.0-44.9, adult ([facility]) 01/04/2018   • Left wrist pain 08/02/2017   • Lunotriquetral ligament tear 08/02/2017   • Palpitations 07/26/2016   • Essential hypertension    • Postoperative hypothyroidism    • Large breasts    • Perimenopausal    • Unspecified disorder of menstruation and other abnormal bleeding from female genital tract 04/11/2014       Current Outpatient Prescriptions   Medication Sig Dispense Refill   • levothyroxine (SYNTHROID) 125 MCG Tab Take 1 Tab by mouth Every morning on an empty stomach. 90 Tab 0   • Ibuprofen 200 MG Cap Take 1 Cap by mouth 3 times a day. 20 Cap 0   • fexofenadine (ALLEGRA) 60 MG Tab Take 1 Tab by mouth every day. 15 Tab 0   • fluticasone (FLONASE) 50 MCG/ACT nasal spray Spray 2 Sprays in nose every day. 16 g 1   • therapeutic multivitamin-minerals (THERAGRAN-M) Tab Take 1 Tab by mouth every day.     • Probiotic Product (PROBIOTIC DAILY PO) Take  by mouth.     • Glucosamine-Chondroitin (MOVE FREE PO) Take 2 Tabs by mouth every morning.     • omeprazole (PRILOSEC) 20 MG CPDR Take 40 mg by mouth every morning.       No current facility-administered medications for this visit.        ROS: As per HPI. Additional pertinent symptoms as noted  below.    Review of Systems   Constitutional: Negative for chills, fever, malaise/fatigue and weight loss.   HENT: Negative for hearing loss.    Eyes: Negative for blurred vision.   Respiratory: Negative for cough and shortness of breath.         Sinus congestion   Cardiovascular: Negative for chest pain, palpitations and leg swelling.   Gastrointestinal: Negative for abdominal pain and nausea.   Musculoskeletal: Positive for joint pain. Negative for back pain and myalgias.   Skin: Negative for rash.   Neurological: Negative for dizziness, sensory change, focal weakness, weakness and headaches.     LMP 03/30/2014     Physical Exam   Constitutional:  oriented to person, place, and time. No distress.  Overweight.  Eyes: Pupils are equal, round. No scleral icterus.  Neck: Neck supple. No visible goiter.   Cardiovascular: Normal rate, regular rhythm and normal heart sounds.  Exam reveals no gallop and no friction rub.  No murmur heard.  No lower extremity edema.  Pulmonary/Chest: Clear to auscultation bilaterally, no crackles or wheezes.  Musculoskeletal:   No joint deformities or nodules grossly nonfocal.  On bilateral hands.  Good range of motion and nontender joints in both hands.  Neurological: alert and oriented to person, place, and time.   Skin: No cyanosis. Nails show no clubbing.  Other: Mildly tender frontal sinus    Assessment and Plan    Arthralgia of right hand  Osteoarthritis vs less likely rheumatoid arthritis  -Family history of RA -could be the beginning of RA  -First metacarpal joint is typical for osteoarthritis  -Patient works a lot with her hands during the day  -Advised to not abuse her hands   -Gave lab slip for anti-CCP and advised patient to only have this lab done if her pain persists more than 2-3 weeks.  -Okay to use ibuprofen 6-800 mg once or twice a day as needed    Sinus congestion  -Occasional nasal congestion with white to yellow mucus.  -Denies fevers, sore throat, cough.  -Patient  already on Allegra and Flonase  -Prescribed Mucinex as needed    RTC with PCP in the next few weeks.    Prevention  Flu at pharmacy  Shingrix at the pharmacy - advised to get second dose in 2-6 months  Mammogram reordered    Signed by: Susan Mireles M.D.

## 2019-02-01 ENCOUNTER — OFFICE VISIT (OUTPATIENT)
Dept: INTERNAL MEDICINE | Facility: MEDICAL CENTER | Age: 54
End: 2019-02-01
Payer: COMMERCIAL

## 2019-02-01 VITALS
BODY MASS INDEX: 40.97 KG/M2 | DIASTOLIC BLOOD PRESSURE: 104 MMHG | TEMPERATURE: 98.8 F | HEIGHT: 64 IN | WEIGHT: 240 LBS | OXYGEN SATURATION: 97 % | HEART RATE: 84 BPM | SYSTOLIC BLOOD PRESSURE: 204 MMHG

## 2019-02-01 DIAGNOSIS — M75.42 IMPINGEMENT SYNDROME OF LEFT SHOULDER: Primary | ICD-10-CM

## 2019-02-01 DIAGNOSIS — R03.0 ELEVATED BP WITHOUT DIAGNOSIS OF HYPERTENSION: ICD-10-CM

## 2019-02-01 DIAGNOSIS — M54.2 NECK PAIN: ICD-10-CM

## 2019-02-01 PROCEDURE — 99214 OFFICE O/P EST MOD 30 MIN: CPT | Mod: GC | Performed by: INTERNAL MEDICINE

## 2019-02-01 RX ORDER — LISINOPRIL 5 MG/1
5 TABLET ORAL DAILY
Qty: 30 TAB | Refills: 0 | Status: SHIPPED | OUTPATIENT
Start: 2019-02-01 | End: 2019-02-19

## 2019-02-01 RX ORDER — CYCLOBENZAPRINE HCL 5 MG
5 TABLET ORAL 3 TIMES DAILY PRN
Qty: 15 TAB | Refills: 0 | Status: SHIPPED | OUTPATIENT
Start: 2019-02-01 | End: 2021-10-07

## 2019-02-01 RX ORDER — GABAPENTIN 100 MG/1
100 CAPSULE ORAL 3 TIMES DAILY
Qty: 25 CAP | Refills: 0 | Status: SHIPPED | OUTPATIENT
Start: 2019-02-01 | End: 2021-10-07

## 2019-02-01 ASSESSMENT — PATIENT HEALTH QUESTIONNAIRE - PHQ9: CLINICAL INTERPRETATION OF PHQ2 SCORE: 0

## 2019-02-02 ENCOUNTER — HOSPITAL ENCOUNTER (OUTPATIENT)
Dept: RADIOLOGY | Facility: MEDICAL CENTER | Age: 54
End: 2019-02-02
Attending: STUDENT IN AN ORGANIZED HEALTH CARE EDUCATION/TRAINING PROGRAM
Payer: COMMERCIAL

## 2019-02-02 DIAGNOSIS — M54.2 NECK PAIN: ICD-10-CM

## 2019-02-02 PROCEDURE — 72040 X-RAY EXAM NECK SPINE 2-3 VW: CPT

## 2019-02-19 ENCOUNTER — OFFICE VISIT (OUTPATIENT)
Dept: INTERNAL MEDICINE | Facility: MEDICAL CENTER | Age: 54
End: 2019-02-19
Payer: COMMERCIAL

## 2019-02-19 VITALS
HEIGHT: 64 IN | SYSTOLIC BLOOD PRESSURE: 134 MMHG | WEIGHT: 240.8 LBS | OXYGEN SATURATION: 97 % | DIASTOLIC BLOOD PRESSURE: 82 MMHG | TEMPERATURE: 98.2 F | HEART RATE: 92 BPM | BODY MASS INDEX: 41.11 KG/M2

## 2019-02-19 DIAGNOSIS — R73.03 PREDIABETES: ICD-10-CM

## 2019-02-19 DIAGNOSIS — M54.2 NECK PAIN: ICD-10-CM

## 2019-02-19 DIAGNOSIS — E66.01 MORBID OBESITY WITH BMI OF 40.0-44.9, ADULT (HCC): ICD-10-CM

## 2019-02-19 DIAGNOSIS — R03.0 ELEVATED BP WITHOUT DIAGNOSIS OF HYPERTENSION: ICD-10-CM

## 2019-02-19 LAB
HBA1C MFR BLD: 5.8 % (ref ?–5.8)
INT CON NEG: NORMAL
INT CON POS: NORMAL

## 2019-02-19 PROCEDURE — 83036 HEMOGLOBIN GLYCOSYLATED A1C: CPT | Mod: GC | Performed by: INTERNAL MEDICINE

## 2019-02-19 PROCEDURE — 99213 OFFICE O/P EST LOW 20 MIN: CPT | Mod: GE | Performed by: INTERNAL MEDICINE

## 2019-02-19 RX ORDER — LISINOPRIL 10 MG/1
10 TABLET ORAL DAILY
Qty: 30 TAB | Refills: 2 | Status: SHIPPED | OUTPATIENT
Start: 2019-02-19 | End: 2021-10-07

## 2019-02-19 NOTE — PROGRESS NOTES
Established Patient    Luann presents today with the following:    CC:   Hypertension  Neck pain  Obesity    HPI:   53-year-old female with a past medical history of prediabetes, morbid obesity, degenerative disease of the cervical spine, thyroid cancer presented to the clinic for follow-up care.  She was previously seen by Dr. Blake for left shoulder pain, neck pain, and elevated blood pressure.  During that period of time the patient was using significant amount of ibuprofen for her back and neck pain.  Currently, she avoids all NSAIDs and uses Tylenol for pain as needed.  She reports improvement of her symptoms with physical therapy and medication as prescribed by Dr. Blake.  With her, the patient has brought a blood pressure log which shows improvement and gradual decline of her blood pressure however she is currently not at goal.  Average systolic blood pressures are in 150s and diastolic blood pressures are in 's.  Reports compliance with medications.  Denies any exertional chest pain, shortness of breath, orthopnea, pedal edema.  Denies prior CAD or CVA.    Patient Active Problem List    Diagnosis Date Noted   • Arthralgia of right hand 10/19/2018   • Sinus pain 05/16/2018   • Iron deficiency anemia 05/16/2018   • Morbid obesity with BMI of 40.0-44.9, adult ([facility]) 01/04/2018   • Left wrist pain 08/02/2017   • Lunotriquetral ligament tear 08/02/2017   • Palpitations 07/26/2016   • Essential hypertension    • Postoperative hypothyroidism    • Large breasts    • Perimenopausal    • Unspecified disorder of menstruation and other abnormal bleeding from female genital tract 04/11/2014       Current Outpatient Prescriptions   Medication Sig Dispense Refill   • lisinopril (PRINIVIL) 10 MG Tab Take 1 Tab by mouth every day. 30 Tab 2   • gabapentin (NEURONTIN) 100 MG Cap Take 1 Cap by mouth 3 times a day. 25 Cap 0   • cyclobenzaprine (FLEXERIL) 5 MG tablet Take 1 Tab by mouth 3 times a day as needed for  Moderate Pain. 15 Tab 0   • levothyroxine (SYNTHROID) 125 MCG Tab Take 1 tablet Monday through Saturday, then 2 tablets on Sunday 90 Tab 3   • Ibuprofen 200 MG Cap Take 1 Cap by mouth 3 times a day. 20 Cap 0   • fexofenadine (ALLEGRA) 60 MG Tab Take 1 Tab by mouth every day. 15 Tab 0   • fluticasone (FLONASE) 50 MCG/ACT nasal spray Spray 2 Sprays in nose every day. 16 g 1   • therapeutic multivitamin-minerals (THERAGRAN-M) Tab Take 1 Tab by mouth every day.     • Probiotic Product (PROBIOTIC DAILY PO) Take  by mouth.     • Glucosamine-Chondroitin (MOVE FREE PO) Take 2 Tabs by mouth every morning.     • omeprazole (PRILOSEC) 20 MG CPDR Take 40 mg by mouth every morning.       No current facility-administered medications for this visit.        ROS: As per HPI. Additional pertinent symptoms as noted below.  All others reviewed and negative    Family History   Problem Relation Age of Onset   • Hypertension Mother    • Heart Attack Father    • Heart Disease Father    • Alzheimer's Disease Father    • Suicide Attempts Brother    • Diabetes Maternal Grandmother    • Colon Cancer Maternal Grandfather      Social History   Substance Use Topics   • Smoking status: Former Smoker     Packs/day: 0.50     Years: 5.00     Types: Cigarettes     Quit date: 1/1/1996   • Smokeless tobacco: Never Used      Comment: 1995   • Alcohol use 0.0 oz/week      Comment: 5/week or less     Past Surgical History:   Procedure Laterality Date   • WRIST ARTHROSCOPY Left 8/2/2017    Procedure: WRIST ARTHROSCOPY TRIANGULAR FIBROCARTILAGE COMPLEX DEBRIDEMENT;  Surgeon: Davion Grey M.D.;  Location: SURGERY [facility];  Service:    • LIGAMENT REPAIR  8/2/2017    Procedure: LIGAMENT REPAIR ULNOTRIQUETRAL ;  Surgeon: Davion Grey M.D.;  Location: SURGERY [facility];  Service:    • BREAST RECONSTRUCTION  2016    reduction   • HYSTEROSCOPY WITH VIDEO OPERATIVE  4/11/2014    Performed by Pepe Deleon M.D. at SURGERY SAME DAY  "[facility] ORS   • DILATION AND CURETTAGE  2014    Performed by Pepe Deleon M.D. at SURGERY SAME DAY [facility] ORS   • TUBAL LIGATION     • EGD W/ENDOSCOPIC ULTRASOUND  2009    Performed by YARELI ANDERS at SURGERY [facility]   • ANIBAL BY LAPAROSCOPY     • LUMBAR FUSION POSTERIOR  2001   • THYROIDECTOMY TOTAL     • WRIST EXPLORATION      right; excision bone   • PRIMARY C SECTION  ,          Allergies: Bactrim [sulfamethoxazole w-trimethoprim]; Diclofenac; Doxycycline; Hydrocodone; Sulfamethizole; Tetracycline; Tramadol; and Zpack [azithromycin]    /82 (BP Location: Right arm, Patient Position: Sitting, BP Cuff Size: Large adult)   Pulse 92   Temp 36.8 °C (98.2 °F) (Temporal)   Ht 1.626 m (5' 4\")   Wt 109.2 kg (240 lb 12.8 oz)   LMP 2014   SpO2 97%   BMI 41.33 kg/m²     Physical Exam   Constitutional:  oriented to person, place, and time. No distress.   Eyes: Pupils are equal, round, and reactive to light. No scleral icterus.  Neck: Neck supple. No thyromegaly present.   Cardiovascular: Normal rate, regular rhythm and normal heart sounds.  Exam reveals no gallop and no friction rub.  No murmur heard.  Pulmonary/Chest: Breath sounds normal. Chest wall is not dull to percussion.   Musculoskeletal:   no edema.   Lymphadenopathy: no cervical adenopathy  Neurological: alert and oriented to person, place, and time.   Skin: No cyanosis. Nails show no clubbing.        Assessment and Plan    1. Neck pain  -Improvement of her symptoms with gabapentin, Flexeril, physical therapy.  -Continue to monitor    2. Elevated BP without diagnosis of hypertension  -Currently on lisinopril 5 mg  -Increase to lisinopril 10 mg  -Continue to monitor daily blood pressure and log.  Increase lisinopril to 20 mg if blood pressure is not within goal of less than 130/80 over the next 2 weeks.  -Return to clinic in 5 weeks    3. Prediabetes  4. Morbid obesity with BMI of " 40.0-44.9, adult ([facility])  -Last A1c was 6.3 in 02/2018  -Point-of-care A1c today 5.8  -Advised all added sugar cessation.  Weight loss with increased aerobic exercise.  -refused HIP classes or nutrition counseling         Return in about 5 weeks (around 3/26/2019).   Follow up with Dr. Santos   Signed by: Andrea Haney M.D.

## 2019-02-21 ENCOUNTER — APPOINTMENT (OUTPATIENT)
Dept: INTERNAL MEDICINE | Facility: MEDICAL CENTER | Age: 54
End: 2019-02-21
Payer: COMMERCIAL

## 2019-03-09 ENCOUNTER — HOSPITAL ENCOUNTER (OUTPATIENT)
Dept: LAB | Facility: MEDICAL CENTER | Age: 54
End: 2019-03-09
Attending: FAMILY MEDICINE
Payer: COMMERCIAL

## 2019-03-09 LAB
ALBUMIN SERPL BCP-MCNC: 4.1 G/DL (ref 3.2–4.9)
ALBUMIN/GLOB SERPL: 1.4 G/DL
ALP SERPL-CCNC: 117 U/L (ref 30–99)
ALT SERPL-CCNC: 20 U/L (ref 2–50)
ANION GAP SERPL CALC-SCNC: 7 MMOL/L (ref 0–11.9)
AST SERPL-CCNC: 19 U/L (ref 12–45)
BILIRUB SERPL-MCNC: 0.4 MG/DL (ref 0.1–1.5)
BUN SERPL-MCNC: 28 MG/DL (ref 8–22)
CALCIUM SERPL-MCNC: 9.6 MG/DL (ref 8.5–10.5)
CHLORIDE SERPL-SCNC: 106 MMOL/L (ref 96–112)
CHOLEST SERPL-MCNC: 180 MG/DL (ref 100–199)
CO2 SERPL-SCNC: 27 MMOL/L (ref 20–33)
CREAT SERPL-MCNC: 0.85 MG/DL (ref 0.5–1.4)
CREAT UR-MCNC: 80.9 MG/DL
GLOBULIN SER CALC-MCNC: 3 G/DL (ref 1.9–3.5)
GLUCOSE SERPL-MCNC: 88 MG/DL (ref 65–99)
HDLC SERPL-MCNC: 65 MG/DL
LDLC SERPL CALC-MCNC: 94 MG/DL
MICROALBUMIN UR-MCNC: <0.7 MG/DL
MICROALBUMIN/CREAT UR: NORMAL MG/G (ref 0–30)
POTASSIUM SERPL-SCNC: 4.3 MMOL/L (ref 3.6–5.5)
PROT SERPL-MCNC: 7.1 G/DL (ref 6–8.2)
RHEUMATOID FACT SER IA-ACNC: <10 IU/ML (ref 0–14)
SODIUM SERPL-SCNC: 140 MMOL/L (ref 135–145)
TRIGL SERPL-MCNC: 105 MG/DL (ref 0–149)
TSH SERPL DL<=0.005 MIU/L-ACNC: 0.22 UIU/ML (ref 0.38–5.33)

## 2019-03-09 PROCEDURE — 80053 COMPREHEN METABOLIC PANEL: CPT

## 2019-03-09 PROCEDURE — 84443 ASSAY THYROID STIM HORMONE: CPT

## 2019-03-09 PROCEDURE — 80061 LIPID PANEL: CPT

## 2019-03-09 PROCEDURE — 36415 COLL VENOUS BLD VENIPUNCTURE: CPT

## 2019-03-09 PROCEDURE — 86431 RHEUMATOID FACTOR QUANT: CPT

## 2019-03-09 PROCEDURE — 82043 UR ALBUMIN QUANTITATIVE: CPT

## 2019-03-09 PROCEDURE — 82570 ASSAY OF URINE CREATININE: CPT

## 2019-03-09 PROCEDURE — 86038 ANTINUCLEAR ANTIBODIES: CPT

## 2019-03-11 LAB — NUCLEAR IGG SER QL IA: NORMAL

## 2019-03-18 ENCOUNTER — HOSPITAL ENCOUNTER (OUTPATIENT)
Dept: HOSPITAL 8 - CFH | Age: 54
Discharge: HOME | End: 2019-03-18
Attending: FAMILY MEDICINE
Payer: COMMERCIAL

## 2019-03-18 DIAGNOSIS — Z12.31: Primary | ICD-10-CM

## 2019-03-18 PROCEDURE — 77063 BREAST TOMOSYNTHESIS BI: CPT

## 2019-04-05 ENCOUNTER — PATIENT MESSAGE (OUTPATIENT)
Dept: INTERNAL MEDICINE | Facility: MEDICAL CENTER | Age: 54
End: 2019-04-05

## 2019-04-05 DIAGNOSIS — M25.512 LEFT SHOULDER PAIN, UNSPECIFIED CHRONICITY: ICD-10-CM

## 2019-05-15 ENCOUNTER — HOSPITAL ENCOUNTER (OUTPATIENT)
Dept: HOSPITAL 8 - ED | Age: 54
Setting detail: OBSERVATION
LOS: 1 days | Discharge: HOME | End: 2019-05-16
Attending: HOSPITALIST | Admitting: HOSPITALIST
Payer: COMMERCIAL

## 2019-05-15 VITALS — SYSTOLIC BLOOD PRESSURE: 136 MMHG | DIASTOLIC BLOOD PRESSURE: 81 MMHG

## 2019-05-15 VITALS — WEIGHT: 241.19 LBS | HEIGHT: 64 IN | BODY MASS INDEX: 41.18 KG/M2

## 2019-05-15 VITALS — SYSTOLIC BLOOD PRESSURE: 128 MMHG | DIASTOLIC BLOOD PRESSURE: 86 MMHG

## 2019-05-15 DIAGNOSIS — Z79.899: ICD-10-CM

## 2019-05-15 DIAGNOSIS — Z82.49: ICD-10-CM

## 2019-05-15 DIAGNOSIS — I10: ICD-10-CM

## 2019-05-15 DIAGNOSIS — E66.01: ICD-10-CM

## 2019-05-15 DIAGNOSIS — E89.0: ICD-10-CM

## 2019-05-15 DIAGNOSIS — R07.89: Primary | ICD-10-CM

## 2019-05-15 DIAGNOSIS — Z85.850: ICD-10-CM

## 2019-05-15 DIAGNOSIS — Z88.9: ICD-10-CM

## 2019-05-15 DIAGNOSIS — N17.9: ICD-10-CM

## 2019-05-15 DIAGNOSIS — K21.9: ICD-10-CM

## 2019-05-15 LAB
ALBUMIN SERPL-MCNC: 3.8 G/DL (ref 3.4–5)
ANION GAP SERPL CALC-SCNC: 5 MMOL/L (ref 5–15)
APTT BLD: 26 SECONDS (ref 25–31)
BASOPHILS # BLD AUTO: 0.02 X10^3/UL (ref 0–0.1)
BASOPHILS NFR BLD AUTO: 0 % (ref 0–1)
CALCIUM SERPL-MCNC: 10.1 MG/DL (ref 8.5–10.1)
CHLORIDE SERPL-SCNC: 108 MMOL/L (ref 98–107)
CREAT SERPL-MCNC: 1.1 MG/DL (ref 0.55–1.02)
EOSINOPHIL # BLD AUTO: 0.11 X10^3/UL (ref 0–0.4)
EOSINOPHIL NFR BLD AUTO: 1 % (ref 1–7)
ERYTHROCYTE [DISTWIDTH] IN BLOOD BY AUTOMATED COUNT: 14.3 % (ref 9.6–15.2)
INR PPP: 0.93 (ref 0.93–1.1)
LYMPHOCYTES # BLD AUTO: 1.49 X10^3/UL (ref 1–3.4)
LYMPHOCYTES NFR BLD AUTO: 17 % (ref 22–44)
MCH RBC QN AUTO: 31.8 PG (ref 27–34.8)
MCHC RBC AUTO-ENTMCNC: 34.4 G/DL (ref 32.4–35.8)
MCV RBC AUTO: 92.4 FL (ref 80–100)
MD: NO
MICROSCOPIC: (no result)
MONOCYTES # BLD AUTO: 0.55 X10^3/UL (ref 0.2–0.8)
MONOCYTES NFR BLD AUTO: 6 % (ref 2–9)
NEUTROPHILS # BLD AUTO: 6.66 X10^3/UL (ref 1.8–6.8)
NEUTROPHILS NFR BLD AUTO: 75 % (ref 42–75)
PLATELET # BLD AUTO: 294 X10^3/UL (ref 130–400)
PMV BLD AUTO: 7.8 FL (ref 7.4–10.4)
PROTHROMBIN TIME: 9.8 SECONDS (ref 9.6–11.5)
RBC # BLD AUTO: 4.61 X10^6/UL (ref 3.82–5.3)
T4 FREE SERPL-MCNC: 0.94 NG/DL (ref 0.76–1.46)
TROPONIN I SERPL-MCNC: < 0.015 NG/ML (ref 0–0.04)

## 2019-05-15 PROCEDURE — 84484 ASSAY OF TROPONIN QUANT: CPT

## 2019-05-15 PROCEDURE — 84439 ASSAY OF FREE THYROXINE: CPT

## 2019-05-15 PROCEDURE — 93306 TTE W/DOPPLER COMPLETE: CPT

## 2019-05-15 PROCEDURE — A9502 TC99M TETROFOSMIN: HCPCS

## 2019-05-15 PROCEDURE — 78452 HT MUSCLE IMAGE SPECT MULT: CPT

## 2019-05-15 PROCEDURE — 81003 URINALYSIS AUTO W/O SCOPE: CPT

## 2019-05-15 PROCEDURE — 85025 COMPLETE CBC W/AUTO DIFF WBC: CPT

## 2019-05-15 PROCEDURE — 85730 THROMBOPLASTIN TIME PARTIAL: CPT

## 2019-05-15 PROCEDURE — 96372 THER/PROPH/DIAG INJ SC/IM: CPT

## 2019-05-15 PROCEDURE — 93017 CV STRESS TEST TRACING ONLY: CPT

## 2019-05-15 PROCEDURE — 85610 PROTHROMBIN TIME: CPT

## 2019-05-15 PROCEDURE — 83036 HEMOGLOBIN GLYCOSYLATED A1C: CPT

## 2019-05-15 PROCEDURE — 83880 ASSAY OF NATRIURETIC PEPTIDE: CPT

## 2019-05-15 PROCEDURE — 82040 ASSAY OF SERUM ALBUMIN: CPT

## 2019-05-15 PROCEDURE — 99284 EMERGENCY DEPT VISIT MOD MDM: CPT

## 2019-05-15 PROCEDURE — G0378 HOSPITAL OBSERVATION PER HR: HCPCS

## 2019-05-15 PROCEDURE — 93005 ELECTROCARDIOGRAM TRACING: CPT

## 2019-05-15 PROCEDURE — 80061 LIPID PANEL: CPT

## 2019-05-15 PROCEDURE — 84443 ASSAY THYROID STIM HORMONE: CPT

## 2019-05-15 PROCEDURE — C9898 INPNT STAY RADIOLABELED ITEM: HCPCS

## 2019-05-15 PROCEDURE — 36415 COLL VENOUS BLD VENIPUNCTURE: CPT

## 2019-05-15 PROCEDURE — 80048 BASIC METABOLIC PNL TOTAL CA: CPT

## 2019-05-15 PROCEDURE — 71045 X-RAY EXAM CHEST 1 VIEW: CPT

## 2019-05-15 RX ADMIN — NITROGLYCERIN PRN MG: 0.4 TABLET SUBLINGUAL at 11:36

## 2019-05-15 RX ADMIN — ENOXAPARIN SODIUM SCH MG: 40 INJECTION SUBCUTANEOUS at 15:01

## 2019-05-15 RX ADMIN — NITROGLYCERIN PRN MG: 0.4 TABLET SUBLINGUAL at 19:38

## 2019-05-15 RX ADMIN — SODIUM CHLORIDE AND POTASSIUM CHLORIDE SCH MLS/HR: .9; .15 SOLUTION INTRAVENOUS at 15:01

## 2019-05-15 RX ADMIN — NITROGLYCERIN PRN MG: 0.4 TABLET SUBLINGUAL at 20:44

## 2019-05-16 VITALS — DIASTOLIC BLOOD PRESSURE: 77 MMHG | SYSTOLIC BLOOD PRESSURE: 105 MMHG

## 2019-05-16 VITALS — DIASTOLIC BLOOD PRESSURE: 71 MMHG | SYSTOLIC BLOOD PRESSURE: 108 MMHG

## 2019-05-16 VITALS — SYSTOLIC BLOOD PRESSURE: 124 MMHG | DIASTOLIC BLOOD PRESSURE: 85 MMHG

## 2019-05-16 LAB
ANION GAP SERPL CALC-SCNC: 6 MMOL/L (ref 5–15)
BASOPHILS # BLD AUTO: 0.03 X10^3/UL (ref 0–0.1)
BASOPHILS NFR BLD AUTO: 1 % (ref 0–1)
CALCIUM SERPL-MCNC: 9.1 MG/DL (ref 8.5–10.1)
CHLORIDE SERPL-SCNC: 110 MMOL/L (ref 98–107)
CHOL/HDL RATIO: 3.5
CREAT SERPL-MCNC: 0.95 MG/DL (ref 0.55–1.02)
EOSINOPHIL # BLD AUTO: 0.11 X10^3/UL (ref 0–0.4)
EOSINOPHIL NFR BLD AUTO: 1 % (ref 1–7)
ERYTHROCYTE [DISTWIDTH] IN BLOOD BY AUTOMATED COUNT: 14.4 % (ref 9.6–15.2)
EST. AVERAGE GLUCOSE BLD GHB EST-MCNC: 117 MG/DL (ref 0–126)
HBA1C MFR BLD: 5.7 % (ref 4.2–6.3)
HDL CHOL %: 29 % (ref 28–40)
HDL CHOLESTEROL (DIRECT): 58 MG/DL (ref 40–60)
LDL CHOLESTEROL,CALCULATED: 116 MG/DL (ref 54–169)
LDLC/HDLC SERPL: 2 {RATIO} (ref 0.5–3)
LYMPHOCYTES # BLD AUTO: 1.43 X10^3/UL (ref 1–3.4)
LYMPHOCYTES NFR BLD AUTO: 19 % (ref 22–44)
MCH RBC QN AUTO: 31.4 PG (ref 27–34.8)
MCHC RBC AUTO-ENTMCNC: 33.9 G/DL (ref 32.4–35.8)
MCV RBC AUTO: 92.6 FL (ref 80–100)
MD: NO
MONOCYTES # BLD AUTO: 0.55 X10^3/UL (ref 0.2–0.8)
MONOCYTES NFR BLD AUTO: 7 % (ref 2–9)
NEUTROPHILS # BLD AUTO: 5.44 X10^3/UL (ref 1.8–6.8)
NEUTROPHILS NFR BLD AUTO: 72 % (ref 42–75)
PLATELET # BLD AUTO: 247 X10^3/UL (ref 130–400)
PMV BLD AUTO: 7.7 FL (ref 7.4–10.4)
RBC # BLD AUTO: 4.31 X10^6/UL (ref 3.82–5.3)
TRIGL SERPL-MCNC: 144 MG/DL (ref 50–200)
VLDLC SERPL CALC-MCNC: 29 MG/DL (ref 0–25)

## 2019-05-16 RX ADMIN — SODIUM CHLORIDE AND POTASSIUM CHLORIDE SCH MLS/HR: .9; .15 SOLUTION INTRAVENOUS at 00:36

## 2019-05-16 RX ADMIN — ENOXAPARIN SODIUM SCH MG: 40 INJECTION SUBCUTANEOUS at 14:10

## 2019-06-29 ENCOUNTER — HOSPITAL ENCOUNTER (OUTPATIENT)
Dept: LAB | Facility: MEDICAL CENTER | Age: 54
End: 2019-06-29
Attending: ANESTHESIOLOGY
Payer: COMMERCIAL

## 2019-06-29 LAB
ANION GAP SERPL CALC-SCNC: 7 MMOL/L (ref 0–11.9)
BUN SERPL-MCNC: 15 MG/DL (ref 8–22)
CALCIUM SERPL-MCNC: 8.9 MG/DL (ref 8.5–10.5)
CHLORIDE SERPL-SCNC: 110 MMOL/L (ref 96–112)
CO2 SERPL-SCNC: 26 MMOL/L (ref 20–33)
CREAT SERPL-MCNC: 1.19 MG/DL (ref 0.5–1.4)
GLUCOSE SERPL-MCNC: 92 MG/DL (ref 65–99)
HCT VFR BLD AUTO: 40.1 % (ref 37–47)
POTASSIUM SERPL-SCNC: 4.3 MMOL/L (ref 3.6–5.5)
SODIUM SERPL-SCNC: 143 MMOL/L (ref 135–145)

## 2019-06-29 PROCEDURE — 85014 HEMATOCRIT: CPT

## 2019-06-29 PROCEDURE — 80048 BASIC METABOLIC PNL TOTAL CA: CPT

## 2019-06-29 PROCEDURE — 36415 COLL VENOUS BLD VENIPUNCTURE: CPT

## 2019-07-20 ENCOUNTER — HOSPITAL ENCOUNTER (OUTPATIENT)
Dept: LAB | Facility: MEDICAL CENTER | Age: 54
End: 2019-07-20
Attending: FAMILY MEDICINE
Payer: COMMERCIAL

## 2019-07-20 LAB
ALBUMIN SERPL BCP-MCNC: 3.8 G/DL (ref 3.2–4.9)
ALBUMIN/GLOB SERPL: 1.3 G/DL
ALP SERPL-CCNC: 158 U/L (ref 30–99)
ALT SERPL-CCNC: 27 U/L (ref 2–50)
ANION GAP SERPL CALC-SCNC: 8 MMOL/L (ref 0–11.9)
AST SERPL-CCNC: 17 U/L (ref 12–45)
BILIRUB SERPL-MCNC: 0.4 MG/DL (ref 0.1–1.5)
BUN SERPL-MCNC: 14 MG/DL (ref 8–22)
CALCIUM SERPL-MCNC: 9.2 MG/DL (ref 8.5–10.5)
CHLORIDE SERPL-SCNC: 106 MMOL/L (ref 96–112)
CHOLEST SERPL-MCNC: 174 MG/DL (ref 100–199)
CO2 SERPL-SCNC: 27 MMOL/L (ref 20–33)
CREAT SERPL-MCNC: 0.98 MG/DL (ref 0.5–1.4)
CREAT UR-MCNC: 113.7 MG/DL
GLOBULIN SER CALC-MCNC: 3 G/DL (ref 1.9–3.5)
GLUCOSE SERPL-MCNC: 97 MG/DL (ref 65–99)
HDLC SERPL-MCNC: 48 MG/DL
LDLC SERPL CALC-MCNC: 100 MG/DL
MICROALBUMIN UR-MCNC: <0.7 MG/DL
MICROALBUMIN/CREAT UR: NORMAL MG/G (ref 0–30)
POTASSIUM SERPL-SCNC: 4.2 MMOL/L (ref 3.6–5.5)
PROT SERPL-MCNC: 6.8 G/DL (ref 6–8.2)
SODIUM SERPL-SCNC: 141 MMOL/L (ref 135–145)
TRIGL SERPL-MCNC: 128 MG/DL (ref 0–149)
TSH SERPL DL<=0.005 MIU/L-ACNC: 5.8 UIU/ML (ref 0.38–5.33)

## 2019-07-20 PROCEDURE — 36415 COLL VENOUS BLD VENIPUNCTURE: CPT

## 2019-07-20 PROCEDURE — 82570 ASSAY OF URINE CREATININE: CPT

## 2019-07-20 PROCEDURE — 80061 LIPID PANEL: CPT

## 2019-07-20 PROCEDURE — 84443 ASSAY THYROID STIM HORMONE: CPT

## 2019-07-20 PROCEDURE — 80053 COMPREHEN METABOLIC PANEL: CPT

## 2019-07-20 PROCEDURE — 82043 UR ALBUMIN QUANTITATIVE: CPT

## 2020-03-11 ENCOUNTER — HOSPITAL ENCOUNTER (OUTPATIENT)
Dept: RADIOLOGY | Facility: MEDICAL CENTER | Age: 55
End: 2020-03-11
Attending: FAMILY MEDICINE
Payer: COMMERCIAL

## 2020-03-11 DIAGNOSIS — R94.5 NONSPECIFIC ABNORMAL RESULTS OF LIVER FUNCTION STUDY: ICD-10-CM

## 2020-03-11 PROCEDURE — 76705 ECHO EXAM OF ABDOMEN: CPT

## 2020-04-17 ENCOUNTER — TELEMEDICINE (OUTPATIENT)
Dept: CARDIOLOGY | Facility: MEDICAL CENTER | Age: 55
End: 2020-04-17
Payer: COMMERCIAL

## 2020-04-17 VITALS
HEIGHT: 65 IN | HEART RATE: 62 BPM | DIASTOLIC BLOOD PRESSURE: 79 MMHG | WEIGHT: 249.1 LBS | BODY MASS INDEX: 41.5 KG/M2 | SYSTOLIC BLOOD PRESSURE: 111 MMHG

## 2020-04-17 DIAGNOSIS — G47.33 OSA (OBSTRUCTIVE SLEEP APNEA): ICD-10-CM

## 2020-04-17 DIAGNOSIS — I10 ESSENTIAL HYPERTENSION: Chronic | ICD-10-CM

## 2020-04-17 DIAGNOSIS — R00.2 PALPITATIONS: ICD-10-CM

## 2020-04-17 DIAGNOSIS — E66.01 MORBID OBESITY WITH BMI OF 40.0-44.9, ADULT (HCC): ICD-10-CM

## 2020-04-17 PROCEDURE — 99203 OFFICE O/P NEW LOW 30 MIN: CPT | Mod: 95,CR | Performed by: INTERNAL MEDICINE

## 2020-04-17 RX ORDER — LISINOPRIL 20 MG/1
20 TABLET ORAL
COMMUNITY
Start: 2020-04-09 | End: 2022-10-06 | Stop reason: SDUPTHER

## 2020-04-17 RX ORDER — FLUOXETINE 10 MG/1
10 CAPSULE ORAL
COMMUNITY
Start: 2020-04-06 | End: 2021-12-27

## 2020-04-17 RX ORDER — ATENOLOL 50 MG/1
50 TABLET ORAL
COMMUNITY
Start: 2020-04-09 | End: 2020-05-20

## 2020-04-17 RX ORDER — IPRATROPIUM BROMIDE 42 UG/1
SPRAY, METERED NASAL
COMMUNITY
Start: 2020-02-20 | End: 2021-10-07

## 2020-04-17 RX ORDER — LEVOTHYROXINE SODIUM 137 UG/1
137 TABLET ORAL
COMMUNITY
Start: 2020-03-07 | End: 2021-10-07

## 2020-04-17 ASSESSMENT — FIBROSIS 4 INDEX: FIB4 SCORE: 0.64

## 2020-04-17 NOTE — PROGRESS NOTES
Chief Complaint   Patient presents with   • Abnormal EKG     New Patient       Subjective:   Luann Morales is a 54 y.o. female who presents today for initial consultation regarding palpitations.  Has a history of obesity, obstructive sleep apnea currently untreated, essential hypertension well treated and palpitations in the past.  In 2016 she had an evaluation for her symptoms that she presents with today which was unremarkable.  She was lost to follow-up.  Subsequently, she has had about 2 weeks of an increase in isolated chest fluttering without associated symptoms.  It will occur on and off for several hours but without runs.  No alarm symptoms.  She is active and walks routinely experiences some shortness of breath at maximal exertion but no chest pain.  She was told she had an abnormal EKG.  There is artifact on her tracing from her primary care physician's office and it is unchanged from 2017, 2016 and earlier.  The outside ECG electronic reading indicates inferior infarction, which is artifactual and not present.  She is a non-smoker who does not drink excessively or do drugs.  She does have a family history of CAD.    Past Medical History:   Diagnosis Date   • Arthritis      knees   • Cancer (HCC) 2000    thyroid ca   • Dental disorder     upper/lower partials   • Essential hypertension    • Heart burn    • Hypothyroidism    • Indigestion    • Pain 07-    left wrist, 6/10   • Snoring     no sleep study   • Unspecified disorder of thyroid 1999    thyroid cancer w/ radiation/surgery     Past Surgical History:   Procedure Laterality Date   • WRIST ARTHROSCOPY Left 8/2/2017    Procedure: WRIST ARTHROSCOPY TRIANGULAR FIBROCARTILAGE COMPLEX DEBRIDEMENT;  Surgeon: Davion Grey M.D.;  Location: SURGERY [facility];  Service:    • LIGAMENT REPAIR  8/2/2017    Procedure: LIGAMENT REPAIR ULNOTRIQUETRAL ;  Surgeon: Davion Grey M.D.;  Location: SURGERY [facility];  Service:    •  BREAST RECONSTRUCTION  2016    reduction   • HYSTEROSCOPY WITH VIDEO OPERATIVE  2014    Performed by Pepe Deleon M.D. at SURGERY SAME DAY [facility]   • DILATION AND CURETTAGE  2014    Performed by Pepe Deleon M.D. at SURGERY SAME DAY [facility]   • TUBAL LIGATION     • EGD W/ENDOSCOPIC ULTRASOUND  2009    Performed by YARELI ANDERS at SURGERY [facility]   • ANIBAL BY LAPAROSCOPY     • LUMBAR FUSION POSTERIOR  2001   • THYROIDECTOMY TOTAL     • WRIST EXPLORATION      right; excision bone   • PRIMARY C SECTION  ,          Family History   Problem Relation Age of Onset   • Hypertension Mother    • Heart Attack Father    • Heart Disease Father    • Alzheimer's Disease Father    • Suicide Attempts Brother    • Diabetes Maternal Grandmother    • Colon Cancer Maternal Grandfather      Social History     Socioeconomic History   • Marital status:      Spouse name: Not on file   • Number of children: Not on file   • Years of education: Not on file   • Highest education level: Not on file   Occupational History   • Not on file   Social Needs   • Financial resource strain: Not on file   • Food insecurity     Worry: Not on file     Inability: Not on file   • Transportation needs     Medical: Not on file     Non-medical: Not on file   Tobacco Use   • Smoking status: Former Smoker     Packs/day: 0.50     Years: 5.00     Pack years: 2.50     Types: Cigarettes     Last attempt to quit: 1996     Years since quittin.3   • Smokeless tobacco: Never Used   • Tobacco comment:    Substance and Sexual Activity   • Alcohol use: Yes     Alcohol/week: 0.0 oz     Comment: 5/week or less   • Drug use: No   • Sexual activity: Not on file   Lifestyle   • Physical activity     Days per week: Not on file     Minutes per session: Not on file   • Stress: Not on file   Relationships   • Social connections     Talks on phone: Not on file     Gets together:  Not on file     Attends [employer] service: Not on file     Active member of club or organization: Not on file     Attends meetings of clubs or organizations: Not on file     Relationship status: Not on file   • Intimate partner violence     Fear of current or ex partner: Not on file     Emotionally abused: Not on file     Physically abused: Not on file     Forced sexual activity: Not on file   Other Topics Concern   • Not on file   Social History Narrative   • Not on file     Allergies   Allergen Reactions   • Bactrim [Sulfamethoxazole W-Trimethoprim] Itching   • Diclofenac Hives and Itching   • Doxycycline Hives   • Hydrocodone Itching and Vomiting   • Sulfamethizole Itching   • Tetracycline Itching   • Tramadol Itching   • Zpack [Azithromycin] Itching     Outpatient Encounter Medications as of 4/17/2020   Medication Sig Dispense Refill   • levothyroxine (SYNTHROID) 137 MCG Tab Take 137 mcg by mouth.     • lisinopril (PRINIVIL) 20 MG Tab Take 20 mg by mouth.     • FLUoxetine (PROZAC) 10 MG Cap Take 10 mg by mouth.     • atenolol (TENORMIN) 50 MG Tab Take 50 mg by mouth.     • fluticasone (FLONASE) 50 MCG/ACT nasal spray Spray 2 Sprays in nose every day. 16 g 1   • therapeutic multivitamin-minerals (THERAGRAN-M) Tab Take 1 Tab by mouth every day.     • Glucosamine-Chondroitin (MOVE FREE PO) Take 2 Tabs by mouth every morning.     • omeprazole (PRILOSEC) 20 MG CPDR Take 40 mg by mouth every morning.     • ipratropium (ATROVENT) 0.06 % Solution USE 2 SPRAY(S) IN EACH NOSTRIL EVERY 12 HOURS AS NEEDED     • lisinopril (PRINIVIL) 10 MG Tab Take 1 Tab by mouth every day. 30 Tab 2   • gabapentin (NEURONTIN) 100 MG Cap Take 1 Cap by mouth 3 times a day. 25 Cap 0   • cyclobenzaprine (FLEXERIL) 5 MG tablet Take 1 Tab by mouth 3 times a day as needed for Moderate Pain. 15 Tab 0   • levothyroxine (SYNTHROID) 125 MCG Tab Take 1 tablet Monday through Saturday, then 2 tablets on Sunday 90 Tab 3   • Ibuprofen 200 MG Cap Take 1 Cap  "by mouth 3 times a day. 20 Cap 0   • fexofenadine (ALLEGRA) 60 MG Tab Take 1 Tab by mouth every day. 15 Tab 0   • Probiotic Product (PROBIOTIC DAILY PO) Take  by mouth.       No facility-administered encounter medications on file as of 4/17/2020.      Review of Systems   All other systems reviewed and are negative.       Objective:   /79 (BP Location: Left arm, Patient Position: Sitting, BP Cuff Size: Adult)   Pulse 62   Ht 1.651 m (5' 5\")   Wt 113 kg (249 lb 1.6 oz)   LMP 03/30/2014   BMI 41.45 kg/m²     Physical Exam   Constitutional: She is oriented to person, place, and time. She appears well-developed and well-nourished. No distress.   Overweight   HENT:   Head: Normocephalic and atraumatic.   Mouth/Throat: Oropharynx is clear and moist. No oropharyngeal exudate.   Eyes: Pupils are equal, round, and reactive to light. Conjunctivae and EOM are normal. No scleral icterus.   Neck: No JVD present.   Pulmonary/Chest: Effort normal. No stridor. No respiratory distress.   Musculoskeletal:         General: No edema.   Neurological: She is alert and oriented to person, place, and time. No cranial nerve deficit.   Skin: Skin is dry. No rash noted. She is not diaphoretic. No erythema. No pallor.   Psychiatric: She has a normal mood and affect. Her behavior is normal. Judgment and thought content normal.   Nursing note and vitals reviewed.    LABS:  Lab Results   Component Value Date/Time    CHOLSTRLTOT 174 07/20/2019 09:26 AM     (H) 07/20/2019 09:26 AM    HDL 48 07/20/2019 09:26 AM    TRIGLYCERIDE 128 07/20/2019 09:26 AM       Lab Results   Component Value Date/Time    WBC 5.8 04/21/2018 09:03 AM    RBC 4.58 04/21/2018 09:03 AM    HEMOGLOBIN 12.8 04/21/2018 09:03 AM    HEMATOCRIT 40.1 06/29/2019 12:17 PM    MCV 87.1 04/21/2018 09:03 AM    NEUTSPOLYS 64.10 04/21/2018 09:03 AM    LYMPHOCYTES 22.10 04/21/2018 09:03 AM    MONOCYTES 6.20 04/21/2018 09:03 AM    EOSINOPHILS 5.70 04/21/2018 09:03 AM    " BASOPHILS 0.70 04/21/2018 09:03 AM    HYPOCHROMIA 1+ 04/09/2014 11:57 AM     Lab Results   Component Value Date/Time    SODIUM 141 07/20/2019 09:26 AM    POTASSIUM 4.2 07/20/2019 09:26 AM    CHLORIDE 106 07/20/2019 09:26 AM    CO2 27 07/20/2019 09:26 AM    GLUCOSE 97 07/20/2019 09:26 AM    BUN 14 07/20/2019 09:26 AM    CREATININE 0.98 07/20/2019 09:26 AM     Lab Results   Component Value Date    HBA1C 5.8 02/19/2019      Lab Results   Component Value Date/Time    ALKPHOSPHAT 158 (H) 07/20/2019 09:26 AM    ASTSGOT 17 07/20/2019 09:26 AM    ALTSGPT 27 07/20/2019 09:26 AM    TBILIRUBIN 0.4 07/20/2019 09:26 AM      Lab Results   Component Value Date/Time    BNPBTYPENAT 9 07/30/2016 10:55 AM      No results found for: TSH  Lab Results   Component Value Date/Time    PROTHROMBTM 12.3 03/08/2014 09:10 AM    INR 0.92 03/08/2014 09:10 AM        EKGs reviewed as above today.    Assessment:     1. Palpitations  EC-ECHOCARDIOGRAM COMPLETE W/O CONT   2. Essential hypertension  EC-ECHOCARDIOGRAM COMPLETE W/O CONT   3. Morbid obesity with BMI of 40.0-44.9, adult (HCC)     4. ILYA (obstructive sleep apnea)         Medical Decision Making:  Today's Assessment / Status / Plan:     She has 2 weeks of benign isolated palpitations which are recurrence from 2016.  Her ECG is unchanged for years, and does not show inferior infarction.  She has a prior echocardiogram years ago that was normal as well and at that time her EKG was identical to that found in media from her primary care physician's office.  I recommend an echocardiogram and if unchanged reassurance.  Should she have significant worry anxiety or symptoms from her isolated palpitations and I would recommend an increase in her atenolol.  I also discussed the importance of pursuing follow-up with pulmonary physicians regarding her CPAP.  She did mention people have not called her with her test results.  We did discuss that her test results are also partly her responsibility to seek  the results.  She now has access to my chart and that should facilitate this further.  I did indicate we will make every attempt to call her back with anything abnormal in a reasonable timeframe.    Follow-up in 3 months unless her symptoms have resolved and follow-up as needed in that case.    This encounter was conducted via Zoom .   Verbal consent was obtained. Patient's identity was verified.

## 2020-05-01 ENCOUNTER — HOSPITAL ENCOUNTER (OUTPATIENT)
Dept: CARDIOLOGY | Facility: MEDICAL CENTER | Age: 55
End: 2020-05-01
Attending: INTERNAL MEDICINE
Payer: COMMERCIAL

## 2020-05-01 DIAGNOSIS — R00.2 PALPITATIONS: ICD-10-CM

## 2020-05-01 DIAGNOSIS — I10 ESSENTIAL HYPERTENSION: Chronic | ICD-10-CM

## 2020-05-01 PROCEDURE — 93306 TTE W/DOPPLER COMPLETE: CPT

## 2020-05-01 PROCEDURE — 93306 TTE W/DOPPLER COMPLETE: CPT | Mod: 26 | Performed by: INTERNAL MEDICINE

## 2020-05-03 LAB
LV EJECT FRACT  99904: 55
LV EJECT FRACT MOD 2C 99903: 50.65
LV EJECT FRACT MOD 4C 99902: 67.19
LV EJECT FRACT MOD BP 99901: 60.65

## 2020-05-08 ENCOUNTER — TELEPHONE (OUTPATIENT)
Dept: CARDIOLOGY | Facility: MEDICAL CENTER | Age: 55
End: 2020-05-08

## 2020-05-08 DIAGNOSIS — I10 ESSENTIAL HYPERTENSION: ICD-10-CM

## 2020-05-08 DIAGNOSIS — R00.2 PALPITATIONS: ICD-10-CM

## 2020-05-08 DIAGNOSIS — G47.33 OSA (OBSTRUCTIVE SLEEP APNEA): ICD-10-CM

## 2020-05-08 NOTE — TELEPHONE ENCOUNTER
Called pt back to discuss. Reassured of normal ECHO results. Pt states her palpitations haven't changed. Reports no issues with her BP, HR, or other symptoms. Appreciative of call back.

## 2020-05-08 NOTE — TELEPHONE ENCOUNTER
TW    Patient called to get their results from their echo they did on 5/1/2020. You can contact the [facility] at [phone].    Thank you,  Viktoriya HESTER

## 2020-05-20 RX ORDER — ATENOLOL 50 MG/1
50 TABLET ORAL SEE ADMIN INSTRUCTIONS
Qty: 135 TAB | Refills: 0 | Status: SHIPPED | OUTPATIENT
Start: 2020-05-20 | End: 2020-09-23

## 2020-05-20 NOTE — TELEPHONE ENCOUNTER
Sina Roldan M.D.  You 2 hours ago (11:08 AM)       Most likely related to her untreated ILYA, needs FU with CPAP. She can increase atenolol to 50qam and 25qpm until then.          Called pt to discuss recommendations. Pt read back medication instructions appropriately. She states she has not been able to make an appointment with her pulmonologist due to COVID-19 precautions and change of insurance. Pulmonology referral placed, advised she will get a phone call from the referral department. She should discuss change of insurance so she can be referred to a pulmonologist who accepts her insurance. Verbalized understanding. Confirmed pt's pharmacy. Appreciative of call and recommendations.    Prescription placed for atenolol 50mg QAM, 25mg QPM. MAR updated.

## 2020-07-24 ENCOUNTER — OFFICE VISIT (OUTPATIENT)
Dept: CARDIOLOGY | Facility: MEDICAL CENTER | Age: 55
End: 2020-07-24
Payer: COMMERCIAL

## 2020-07-24 VITALS
DIASTOLIC BLOOD PRESSURE: 80 MMHG | SYSTOLIC BLOOD PRESSURE: 128 MMHG | HEART RATE: 58 BPM | BODY MASS INDEX: 43.19 KG/M2 | WEIGHT: 253 LBS | HEIGHT: 64 IN | OXYGEN SATURATION: 97 %

## 2020-07-24 DIAGNOSIS — R00.2 PALPITATIONS: ICD-10-CM

## 2020-07-24 PROCEDURE — 99213 OFFICE O/P EST LOW 20 MIN: CPT | Performed by: INTERNAL MEDICINE

## 2020-07-24 RX ORDER — LEVOTHYROXINE SODIUM 50 UG/1
TABLET ORAL
COMMUNITY
Start: 2020-07-14 | End: 2021-10-07

## 2020-07-24 RX ORDER — LEVOTHYROXINE SODIUM 0.05 MG/1
50 TABLET ORAL
COMMUNITY
End: 2021-10-07

## 2020-07-24 NOTE — PROGRESS NOTES
Chief Complaint   Patient presents with   • Palpitations       Subjective:   Luann Morales is a 54 y.o. female who presents today for palpitations.  Has a history of obesity, obstructive sleep apnea currently untreated, essential hypertension well treated and palpitations in the past.  In 2016 she had an evaluation for her symptoms that she presents with today which was unremarkable.  She was lost to follow-up.  Subsequently, she has had about 2 weeks of an increase in isolated chest fluttering without associated symptoms.  It will occur on and off for several hours but without runs.  No alarm symptoms.  She is active and walks routinely experiences some shortness of breath at maximal exertion but no chest pain.  She was told she had an abnormal EKG.  There is artifact on her tracing from her primary care physician's office and it is unchanged from 2017, 2016 and earlier.  The outside ECG electronic reading indicates inferior infarction, which is artifactual and not present.  She is a non-smoker who does not drink excessively or do drugs.  She does have a family history of CAD.    In the interim she had a completely normal echocardiogram.  Adjustment of her atenolol improved her symptoms.  No red flag symptoms.  Symptoms recur with stress.    Past Medical History:   Diagnosis Date   • Arthritis      knees   • Cancer (HCC) 2000    thyroid ca   • Dental disorder     upper/lower partials   • Essential hypertension    • Heart burn    • Hypothyroidism    • Indigestion    • Pain 07-    left wrist, 6/10   • Snoring     no sleep study   • Unspecified disorder of thyroid 1999    thyroid cancer w/ radiation/surgery     Past Surgical History:   Procedure Laterality Date   • WRIST ARTHROSCOPY Left 8/2/2017    Procedure: WRIST ARTHROSCOPY TRIANGULAR FIBROCARTILAGE COMPLEX DEBRIDEMENT;  Surgeon: Davion Grey M.D.;  Location: SURGERY [facility];  Service:    • LIGAMENT REPAIR  8/2/2017    Procedure:  LIGAMENT REPAIR VENU ;  Surgeon: Davion Grey M.D.;  Location: SURGERY [facility];  Service:    • BREAST RECONSTRUCTION  2016    reduction   • HYSTEROSCOPY WITH VIDEO OPERATIVE  2014    Performed by Pepe Deleon M.D. at SURGERY SAME DAY [facility]   • DILATION AND CURETTAGE  2014    Performed by Pepe Deleon M.D. at SURGERY SAME DAY [facility]   • TUBAL LIGATION     • EGD W/ENDOSCOPIC ULTRASOUND  2009    Performed by YARELI ANDERS at SURGERY [facility]   • ANIBAL BY LAPAROSCOPY     • LUMBAR FUSION POSTERIOR  2001   • THYROIDECTOMY TOTAL     • WRIST EXPLORATION      right; excision bone   • PRIMARY C SECTION  1995         Family History   Problem Relation Age of Onset   • Hypertension Mother    • Heart Attack Father    • Heart Disease Father    • Alzheimer's Disease Father    • Suicide Attempts Brother    • Diabetes Maternal Grandmother    • Colon Cancer Maternal Grandfather      Social History     Socioeconomic History   • Marital status:      Spouse name: Not on file   • Number of children: Not on file   • Years of education: Not on file   • Highest education level: Not on file   Occupational History   • Not on file   Social Needs   • Financial resource strain: Not on file   • Food insecurity     Worry: Not on file     Inability: Not on file   • Transportation needs     Medical: Not on file     Non-medical: Not on file   Tobacco Use   • Smoking status: Former Smoker     Packs/day: 0.50     Years: 5.00     Pack years: 2.50     Types: Cigarettes     Last attempt to quit: 1996     Years since quittin.5   • Smokeless tobacco: Never Used   • Tobacco comment:    Substance and Sexual Activity   • Alcohol use: Yes     Alcohol/week: 0.0 oz     Comment: 5/week or less   • Drug use: No   • Sexual activity: Not on file   Lifestyle   • Physical activity     Days per week: Not on file     Minutes per session: Not  on file   • Stress: Not on file   Relationships   • Social connections     Talks on phone: Not on file     Gets together: Not on file     Attends Jew service: Not on file     Active member of club or organization: Not on file     Attends meetings of clubs or organizations: Not on file     Relationship status: Not on file   • Intimate partner violence     Fear of current or ex partner: Not on file     Emotionally abused: Not on file     Physically abused: Not on file     Forced sexual activity: Not on file   Other Topics Concern   • Not on file   Social History Narrative   • Not on file     Allergies   Allergen Reactions   • Bactrim [Sulfamethoxazole W-Trimethoprim] Itching   • Diclofenac Hives and Itching   • Doxycycline Hives   • Hydrocodone Itching and Vomiting   • Sulfamethizole Itching   • Tetracycline Itching   • Tramadol Itching   • Zpack [Azithromycin] Itching     Outpatient Encounter Medications as of 7/24/2020   Medication Sig Dispense Refill   • levothyroxine (SYNTHROID) 50 MCG Tab Take 50 mcg by mouth Every morning on an empty stomach.     • Cyanocobalamin (VITAMIN B-12 PO) Take  by mouth.     • Ibuprofen-diphenhydrAMINE Cit (ADVIL PM PO) Take  by mouth.     • aspirin EC (ECOTRIN) 325 MG Tablet Delayed Response Take 325 mg by mouth every day.     • CALCIUM PO Take  by mouth.     • atenolol (TENORMIN) 50 MG Tab Take 1 Tab by mouth See Admin Instructions. 135 Tab 0   • lisinopril (PRINIVIL) 20 MG Tab Take 20 mg by mouth.     • fluticasone (FLONASE) 50 MCG/ACT nasal spray Spray 2 Sprays in nose every day. 16 g 1   • Glucosamine-Chondroitin (MOVE FREE PO) Take 2 Tabs by mouth every morning.     • omeprazole (PRILOSEC) 20 MG CPDR Take 40 mg by mouth every morning.     • hyoscyamine (LEVSIN) 0.125 MG SL Tab DISSOLVE 1 TO 2 TABLETS IN MOUTH EVERY 6 HOURS AS NEEDED FOR PAIN     • EUTHYROX 50 MCG Tab Take  by mouth.     • levothyroxine (SYNTHROID) 137 MCG Tab Take 137 mcg by mouth.     • FLUoxetine (PROZAC)  "10 MG Cap Take 10 mg by mouth.     • ipratropium (ATROVENT) 0.06 % Solution USE 2 SPRAY(S) IN EACH NOSTRIL EVERY 12 HOURS AS NEEDED     • lisinopril (PRINIVIL) 10 MG Tab Take 1 Tab by mouth every day. (Patient not taking: Reported on 7/24/2020) 30 Tab 2   • gabapentin (NEURONTIN) 100 MG Cap Take 1 Cap by mouth 3 times a day. (Patient not taking: Reported on 7/24/2020) 25 Cap 0   • cyclobenzaprine (FLEXERIL) 5 MG tablet Take 1 Tab by mouth 3 times a day as needed for Moderate Pain. (Patient not taking: Reported on 7/24/2020) 15 Tab 0   • levothyroxine (SYNTHROID) 125 MCG Tab Take 1 tablet Monday through Saturday, then 2 tablets on Sunday (Patient not taking: Reported on 7/24/2020) 90 Tab 3   • Ibuprofen 200 MG Cap Take 1 Cap by mouth 3 times a day. (Patient not taking: Reported on 7/24/2020) 20 Cap 0   • fexofenadine (ALLEGRA) 60 MG Tab Take 1 Tab by mouth every day. (Patient not taking: Reported on 7/24/2020) 15 Tab 0   • therapeutic multivitamin-minerals (THERAGRAN-M) Tab Take 1 Tab by mouth every day.     • Probiotic Product (PROBIOTIC DAILY PO) Take  by mouth.       No facility-administered encounter medications on file as of 7/24/2020.      Review of Systems   All other systems reviewed and are negative.       Objective:   /80 (BP Location: Left arm, Patient Position: Sitting, BP Cuff Size: Adult)   Pulse (!) 58   Ht 1.626 m (5' 4\")   Wt 114.8 kg (253 lb)   LMP 03/30/2014   SpO2 97%   BMI 43.43 kg/m²     Physical Exam   Constitutional: She is oriented to person, place, and time. She appears well-developed and well-nourished. No distress.   HENT:   Head: Normocephalic and atraumatic.   Right Ear: External ear normal.   Left Ear: External ear normal.   Mouth/Throat: No oropharyngeal exudate.   Eyes: Pupils are equal, round, and reactive to light. Conjunctivae and EOM are normal. Right eye exhibits no discharge. Left eye exhibits no discharge. No scleral icterus.   Neck: Normal range of motion. Neck " supple. No JVD present. No tracheal deviation present. No thyromegaly present.   Cardiovascular: Normal rate, regular rhythm, S1 normal, S2 normal, normal heart sounds and intact distal pulses. PMI is not displaced. Exam reveals no gallop, no S3, no S4 and no friction rub.   No murmur heard.  Pulses:       Carotid pulses are 2+ on the right side and 2+ on the left side.       Radial pulses are 2+ on the right side and 2+ on the left side.        Popliteal pulses are 2+ on the right side and 2+ on the left side.        Dorsalis pedis pulses are 2+ on the right side and 2+ on the left side.        Posterior tibial pulses are 2+ on the right side and 2+ on the left side.   Pulmonary/Chest: Effort normal and breath sounds normal. No respiratory distress. She has no wheezes. She has no rales. She exhibits no tenderness.   Abdominal: Soft. Bowel sounds are normal. She exhibits no distension. There is no abdominal tenderness.   Musculoskeletal: Normal range of motion.         General: No tenderness or edema.   Neurological: She is alert and oriented to person, place, and time. No cranial nerve deficit (Cranial nerves II through XII grossly intact).   Skin: Skin is warm and dry. No rash noted. She is not diaphoretic. No erythema.   Psychiatric: She has a normal mood and affect. Her behavior is normal. Judgment and thought content normal.   Vitals reviewed.    LABS:  Lab Results   Component Value Date/Time    CHOLSTRLTOT 174 07/20/2019 09:26 AM     (H) 07/20/2019 09:26 AM    HDL 48 07/20/2019 09:26 AM    TRIGLYCERIDE 128 07/20/2019 09:26 AM       Lab Results   Component Value Date/Time    WBC 5.8 04/21/2018 09:03 AM    RBC 4.58 04/21/2018 09:03 AM    HEMOGLOBIN 12.8 04/21/2018 09:03 AM    HEMATOCRIT 40.1 06/29/2019 12:17 PM    MCV 87.1 04/21/2018 09:03 AM    NEUTSPOLYS 64.10 04/21/2018 09:03 AM    LYMPHOCYTES 22.10 04/21/2018 09:03 AM    MONOCYTES 6.20 04/21/2018 09:03 AM    EOSINOPHILS 5.70 04/21/2018 09:03 AM     BASOPHILS 0.70 04/21/2018 09:03 AM    HYPOCHROMIA 1+ 04/09/2014 11:57 AM     Lab Results   Component Value Date/Time    SODIUM 141 07/20/2019 09:26 AM    POTASSIUM 4.2 07/20/2019 09:26 AM    CHLORIDE 106 07/20/2019 09:26 AM    CO2 27 07/20/2019 09:26 AM    GLUCOSE 97 07/20/2019 09:26 AM    BUN 14 07/20/2019 09:26 AM    CREATININE 0.98 07/20/2019 09:26 AM     Lab Results   Component Value Date    HBA1C 5.8 02/19/2019      Lab Results   Component Value Date/Time    ALKPHOSPHAT 158 (H) 07/20/2019 09:26 AM    ASTSGOT 17 07/20/2019 09:26 AM    ALTSGPT 27 07/20/2019 09:26 AM    TBILIRUBIN 0.4 07/20/2019 09:26 AM      Lab Results   Component Value Date/Time    BNPBTYPENAT 9 07/30/2016 10:55 AM      No results found for: TSH  Lab Results   Component Value Date/Time    PROTHROMBTM 12.3 03/08/2014 09:10 AM    INR 0.92 03/08/2014 09:10 AM        EKGs reviewed as above today.    Assessment:     1. Palpitations         Medical Decision Making:  Today's Assessment / Status / Plan:       Normal echocardiogram.  Unchanged ECG.  Benign palpitations.  Stress triggers.  Recommend avoidance of stress and other triggers, reassurance was offered and continue atenolol.  She can follow-up with cardiology yearly or as needed.

## 2020-09-21 DIAGNOSIS — R00.2 PALPITATIONS: ICD-10-CM

## 2020-09-21 DIAGNOSIS — I10 ESSENTIAL HYPERTENSION: ICD-10-CM

## 2020-09-23 ENCOUNTER — TELEPHONE (OUTPATIENT)
Dept: CARDIOLOGY | Facility: MEDICAL CENTER | Age: 55
End: 2020-09-23

## 2020-09-23 RX ORDER — ATENOLOL 50 MG/1
TABLET ORAL
Qty: 135 TAB | Refills: 3 | Status: SHIPPED | OUTPATIENT
Start: 2020-09-23 | End: 2021-08-25

## 2020-10-20 ENCOUNTER — SLEEP CENTER VISIT (OUTPATIENT)
Dept: SLEEP MEDICINE | Facility: MEDICAL CENTER | Age: 55
End: 2020-10-20
Payer: COMMERCIAL

## 2020-10-20 VITALS
OXYGEN SATURATION: 96 % | WEIGHT: 255 LBS | DIASTOLIC BLOOD PRESSURE: 84 MMHG | SYSTOLIC BLOOD PRESSURE: 130 MMHG | HEIGHT: 65 IN | BODY MASS INDEX: 42.49 KG/M2 | RESPIRATION RATE: 14 BRPM | HEART RATE: 60 BPM

## 2020-10-20 DIAGNOSIS — R00.2 PALPITATIONS: ICD-10-CM

## 2020-10-20 DIAGNOSIS — G47.33 OSA (OBSTRUCTIVE SLEEP APNEA): ICD-10-CM

## 2020-10-20 PROCEDURE — 99204 OFFICE O/P NEW MOD 45 MIN: CPT | Performed by: FAMILY MEDICINE

## 2020-10-20 NOTE — PROGRESS NOTES
"     [facility]  Consult Note     Date: 10/20/2020 / Time: 9:19 AM      Thank you for requesting a sleep medicine consultation on Luann Morales at the sleep center. She presents today with the chief complaints of snoring, trouble falling/staying asleep andexcessive daytime sleepiness. She had a sleep study at [facility] however she never inittiated the therapy. She is referred by Dr. Roldan for evaluation and treatment of sleep disorder breathing.     HISTORY OF PRESENT ILLNESS:       She is currently sleeping in recliner due to shoulder pain. At night,  Luann Morales goes to bed around 10:30 pm on weekdays and on the weekends. She gets out of bed at 5:30 am on weekdays and on the weekends.  She averages about 6 hrs of sleep on a good night and 4-5 hrs on a bad night. Pt has bad nights about 3-4 nights per week. She falls asleep within 60 minutes. She wakes up 2-3 times during the night due to bathroom use and for no obvious reason. It takes her 5-10 min to fall back asleep. She is  aware of snoring but denies breathing pauses/gasping or choking in sleep.  She  denies any symptoms of restless legs syndrome such as an \"urge to move\"  She  legs in the evening or bedtime. She  denies any symptoms of narcolepsy such as sleep paralysis or cataplexy, or any symptoms to suggest parasomnias such as sleep walking or acting out of dreams. She  has not used any medications for the sleep problem.Overall, she doesnot finds her sleep refreshing. In terms of  excessive daytime sleepiness, she complains of sleepiness  while reading or watching TV however denies while driving.She does take regular naps. The naps are usually 30-60 min long.She drinks about 2 caffeinated beverages per day.    She also has  has a past medical history of Arthritis, Cancer (HCC) (2000), Dental disorder, Essential hypertension, Heart burn, Hypothyroidism, Indigestion, Pain (07-), Snoring, and Unspecified disorder of thyroid " (1999). She was recently seen by cardiology for the palpitations which are intermittent and can last up to hours. Denies CP or SOB today. Her last ECHO was on 5/1/2020 which showed EF of 55% and no significant valve abnormalities. Her symptoms have been going on for last 2 years. Her recent most holter was 2 years ago with [facility] cardiology. Her synthroid was increased from 50 mcg to 125 mcg however denies being symptoms associated with the change in medication. Her current medication include atenolol, lisinopril and prozac.     REVIEW OF SYSTEMS:       Constitutional: Denies fevers, Denies weight changes  Eyes: Denies changes in vision, no eye pain  Ears/Nose/Throat/Mouth: Denies nasal congestion or sore throat   Cardiovascular: Denies chest pain or palpitations   Respiratory: Denies shortness of breath , Denies cough  Gastrointestinal/Hepatic: Denies abdominal pain, nausea, vomiting, diarrhea, constipation or GI bleeding   Genitourinary: Denies bladder dysfunction, dysuria or frequency  Musculoskeletal/Rheum: Denies  joint pain and swelling   Skin/Breast: Denies rash  Neurological: Denies headache, confusion, memory loss or focal weakness/parasthesias  Psychiatric: denies mood disorder     Comprehensive review of systems form is reviewed with the patient and is attached in the EMR.     PMH:  has a past medical history of Arthritis, Cancer ([facility]) (2000), Dental disorder, Essential hypertension, Heart burn, Hypothyroidism, Indigestion, Pain (07-), Snoring, and Unspecified disorder of thyroid (1999).  MEDS:   Current Outpatient Medications:   •  atenolol (TENORMIN) 50 MG Tab, TAKE 1 TABLET BY MOUTH IN THE MORNING AND 1/2 (ONE-HALF) IN THE EVENING, Disp: 135 Tab, Rfl: 3  •  Cyanocobalamin (VITAMIN B-12 PO), Take  by mouth., Disp: , Rfl:   •  Ibuprofen-diphenhydrAMINE Cit (ADVIL PM PO), Take  by mouth., Disp: , Rfl:   •  aspirin EC (ECOTRIN) 325 MG Tablet Delayed Response, Take 325 mg by mouth every day.,  Disp: , Rfl:   •  CALCIUM PO, Take  by mouth., Disp: , Rfl:   •  lisinopril (PRINIVIL) 20 MG Tab, Take 20 mg by mouth., Disp: , Rfl:   •  FLUoxetine (PROZAC) 10 MG Cap, Take 10 mg by mouth., Disp: , Rfl:   •  levothyroxine (SYNTHROID) 125 MCG Tab, Take 1 tablet Monday through Saturday, then 2 tablets on Sunday, Disp: 90 Tab, Rfl: 3  •  omeprazole (PRILOSEC) 20 MG CPDR, Take 40 mg by mouth every morning., Disp: , Rfl:   •  hyoscyamine (LEVSIN) 0.125 MG SL Tab, DISSOLVE 1 TO 2 TABLETS IN MOUTH EVERY 6 HOURS AS NEEDED FOR PAIN, Disp: , Rfl:   •  EUTHYROX 50 MCG Tab, Take  by mouth., Disp: , Rfl:   •  levothyroxine (SYNTHROID) 50 MCG Tab, Take 50 mcg by mouth Every morning on an empty stomach., Disp: , Rfl:   •  levothyroxine (SYNTHROID) 137 MCG Tab, Take 137 mcg by mouth., Disp: , Rfl:   •  ipratropium (ATROVENT) 0.06 % Solution, USE 2 SPRAY(S) IN EACH NOSTRIL EVERY 12 HOURS AS NEEDED, Disp: , Rfl:   •  lisinopril (PRINIVIL) 10 MG Tab, Take 1 Tab by mouth every day. (Patient not taking: Reported on 7/24/2020), Disp: 30 Tab, Rfl: 2  •  gabapentin (NEURONTIN) 100 MG Cap, Take 1 Cap by mouth 3 times a day. (Patient not taking: Reported on 7/24/2020), Disp: 25 Cap, Rfl: 0  •  cyclobenzaprine (FLEXERIL) 5 MG tablet, Take 1 Tab by mouth 3 times a day as needed for Moderate Pain. (Patient not taking: Reported on 7/24/2020), Disp: 15 Tab, Rfl: 0  •  Ibuprofen 200 MG Cap, Take 1 Cap by mouth 3 times a day. (Patient not taking: Reported on 7/24/2020), Disp: 20 Cap, Rfl: 0  •  fexofenadine (ALLEGRA) 60 MG Tab, Take 1 Tab by mouth every day. (Patient not taking: Reported on 7/24/2020), Disp: 15 Tab, Rfl: 0  •  fluticasone (FLONASE) 50 MCG/ACT nasal spray, Spray 2 Sprays in nose every day. (Patient not taking: Reported on 10/20/2020), Disp: 16 g, Rfl: 1  •  therapeutic multivitamin-minerals (THERAGRAN-M) Tab, Take 1 Tab by mouth every day., Disp: , Rfl:   •  Probiotic Product (PROBIOTIC DAILY PO), Take  by mouth., Disp: , Rfl:   •   Glucosamine-Chondroitin (MOVE FREE PO), Take 2 Tabs by mouth every morning., Disp: , Rfl:   ALLERGIES:   Allergies   Allergen Reactions   • Bactrim [Sulfamethoxazole W-Trimethoprim] Itching   • Diclofenac Hives and Itching   • Doxycycline Hives   • Hydrocodone Itching and Vomiting   • Sulfamethizole Itching   • Tetracycline Itching   • Tramadol Itching   • Zpack [Azithromycin] Itching     SURGHX:   Past Surgical History:   Procedure Laterality Date   • WRIST ARTHROSCOPY Left 2017    Procedure: WRIST ARTHROSCOPY TRIANGULAR FIBROCARTILAGE COMPLEX DEBRIDEMENT;  Surgeon: Davion Grey M.D.;  Location: SURGERY [facility];  Service:    • LIGAMENT REPAIR  2017    Procedure: LIGAMENT REPAIR ULNOTRIQUETRAL ;  Surgeon: Davion Grey M.D.;  Location: SURGERY [facility];  Service:    • BREAST RECONSTRUCTION  2016    reduction   • HYSTEROSCOPY WITH VIDEO OPERATIVE  2014    Performed by Pepe Deleon M.D. at SURGERY SAME DAY [facility]   • DILATION AND CURETTAGE  2014    Performed by Pepe Deleon M.D. at SURGERY SAME DAY [facility]   • TUBAL LIGATION     • EGD W/ENDOSCOPIC ULTRASOUND  2009    Performed by YARELI ANDERS at SURGERY [facility]   • ANIBAL BY LAPAROSCOPY     • LUMBAR FUSION POSTERIOR  2001   • THYROIDECTOMY TOTAL     • WRIST EXPLORATION      right; excision bone   • PRIMARY C SECTION  ,          SOCHX:  reports that she quit smoking about 24 years ago. Her smoking use included cigarettes. She has a 2.50 pack-year smoking history. She has never used smokeless tobacco. She reports current alcohol use. She reports that she does not use drugs.   FH:   Family History   Problem Relation Age of Onset   • Hypertension Mother    • Heart Attack Father    • Heart Disease Father    • Alzheimer's Disease Father    • Suicide Attempts Brother    • Diabetes Maternal Grandmother    • Colon Cancer Maternal Grandfather   "          Physical Exam:  Vitals/ General Appearance:   Weight/BMI: Body mass index is 42.43 kg/m².  Resp 14   Ht 1.651 m (5' 5\")   Wt 115.7 kg (255 lb)   Vitals:    10/20/20 0917   Resp: 14   Weight: 115.7 kg (255 lb)   Height: 1.651 m (5' 5\")     Constitutional: Alert, no distress, well-groomed.  Skin: No rashes in visible areas.  Eye: Round. Conjunctiva clear, lids normal. No icterus.   ENMT: Lips pink without lesions, moist mucous membranes. Phonation normal.Oropharynx appears crowded in that the palate is overhanging  Neck: No masses, no thyromegaly. Moves freely without pain.  CV: Pulse as reported by patient  Respiratory: Unlabored respiratory effort, no cough or audible wheeze  Psych: Alert and oriented x3, normal affect and mood. speech was clear and fluent without dysarthria.    INVESTIGATIONS:       ASSESSMENT AND PLAN     1. She  has symptoms of Obstructive Sleep Apnea (ILYA). She  has excessive daytime sleepiness that  interferes with activites of daily living. She  risk factors for ILYA include obesity, thick neck, and crowded oropharynx.     The pathophysiology of ILYA and the increased risk of cardiovascular morbidity from untreated ILYA is discussed in detail with the patient.    We have discussed diagnostic options including in-laboratory, attended polysomnography and home sleep testing. We have also discussed treatment options including airway pressurization, reconstructive otolaryngologic surgery, dental appliances and weight management.       Subsequently,treatment options will be discussed based on the diagnostic study. Meanwhile, She is urged to avoid supine sleep, weight gain and alcoholic beverages since all of these can worsen ILYA. She is cautioned against drowsy driving. If She feels sleepy while driving, She must pull over for a break/nap, rather than persist on the road, in the interest of She own safety and that of others on the road.    Plan  -  She  will be scheduled for an overnight " HST to assess sleep related  breathing disorder.    2. Regarding treatment of other past medical problems and general health maintenance,  She is urged to follow up with PCP.    .

## 2020-11-09 ENCOUNTER — HOME STUDY (OUTPATIENT)
Dept: SLEEP MEDICINE | Facility: MEDICAL CENTER | Age: 55
End: 2020-11-09
Attending: FAMILY MEDICINE
Payer: COMMERCIAL

## 2020-11-09 DIAGNOSIS — G47.33 OSA (OBSTRUCTIVE SLEEP APNEA): ICD-10-CM

## 2020-11-09 DIAGNOSIS — R00.2 PALPITATIONS: ICD-10-CM

## 2020-11-09 PROCEDURE — 95806 SLEEP STUDY UNATT&RESP EFFT: CPT | Performed by: INTERNAL MEDICINE

## 2020-11-12 NOTE — PROCEDURES
Interpretation:    This home sleep test was performed on 2020 using a [employer] NightOne recording device. The device has 3 sensors (effort belt, cannula and oximeter) and a built-in body position sensor that provide seven channels of data (body position, pressure flow, snore, respiratory effort, SpO2, pleth and pulse rate).  The effort belt utilizes respiratory inductive plethysmography technology.      The total recording time was 479.5 minutes, the time in bed was 179.5 minutes, and the monitoring time was 468 minutes.    The device recorded 3 central apneas, 71 obstructive apneas, 1 mixed apneas, 321 hypopneas, 396 apneas and hypopneas, and 0 RERAs.  The AURA (respiratory event index which is a surrogate for the AHI) was 50.8.  The OAI (obstructive apnea index) was 9.1.  The JARRETT (the central apnea index) was 0.4.  The supine AURA was 53.2.  Most of the respiratory events occurred in the supine position.    The patient experienced 377 desaturations and the oxygen desaturation index was 52.1.  During sleep the average saturation was 90%, the ramon saturation was 73%, and the highest saturation was 98%.  The patient spent 36.5% of TIB with saturations less than 90%.      The mean heart rate during sleep was 60 bpm, the maximum heart rate during sleep was 83 bpm, and the minimum heart rate during sleep was 49 bpm.    The patient experienced 779 snoring episodes.  The percentage of snoring was 44.3%.    Assessment:    Severe sleep apnea - AURA 50.8  Significant nocturnal desaturation - ramon saturation 73% - less than 90% for 36.5% of the TIB  Snorin total snoring episodes/44.3% snoring        Recommendation:    Recommend a positive airway pressure titration

## 2020-11-16 ENCOUNTER — PATIENT MESSAGE (OUTPATIENT)
Dept: SLEEP MEDICINE | Facility: MEDICAL CENTER | Age: 55
End: 2020-11-16

## 2020-11-16 DIAGNOSIS — G47.33 OSA (OBSTRUCTIVE SLEEP APNEA): ICD-10-CM

## 2021-01-29 ENCOUNTER — TELEMEDICINE (OUTPATIENT)
Dept: SLEEP MEDICINE | Facility: MEDICAL CENTER | Age: 56
End: 2021-01-29
Payer: COMMERCIAL

## 2021-01-29 VITALS — HEIGHT: 64 IN | BODY MASS INDEX: 48.65 KG/M2 | WEIGHT: 285 LBS

## 2021-01-29 DIAGNOSIS — I10 ESSENTIAL HYPERTENSION: Chronic | ICD-10-CM

## 2021-01-29 DIAGNOSIS — G47.33 OSA (OBSTRUCTIVE SLEEP APNEA): ICD-10-CM

## 2021-01-29 PROCEDURE — 99213 OFFICE O/P EST LOW 20 MIN: CPT | Mod: 95,CR | Performed by: NURSE PRACTITIONER

## 2021-01-29 NOTE — PROGRESS NOTES
Virtual Visit: Established Patient   This visit was conducted via Zoom using secure and encrypted videoconferencing technology. The patient was in a private location in the state [facility].    The patient's identity was confirmed and verbal consent was obtained for this virtual visit.  Given the importance of social distancing and other strategies recommended to reduce the risk of COVID-19 transmission, I am providing medical care to this patient via audio/video visit in place of an in person visit at the request of the patient.  Subjective:   CC:   Chief Complaint   Patient presents with   • Follow-Up     ILYA-Last seen 10/20/2020   • Results     HST-11/09/2020       Luann Morales is a 55 y.o. female presenting for evaluation and management of ILYA. PMH includes hypertension, postoperative hypothyroidism, palpitations, iron deficiency anemia, morbid obesity, cancer, former smoker 0.5 packs/day for 5 years quit in 1/1/96, GERD, indigestion.    ECHO from 5/1/2020 showed EF of 55% and no significant valve abnormalities.     Patient was set up with a CPAP in December 2020.  First compliance report from 12/30/20-1/28/21 was downloaded and reviewed with the patient which showed autoCPAP 5-15 cmH2O,97% compliance, 6hrs 27 min use, AHI of 1.6. She is tolerating the pressure and mask well, but is still getting used to it. She goes to bed between 9 pm-12 am and wakes up between 5-8 am.  Prior to using the CPAP she was going to bed at 11 PM and would wake up at 7 AM.  Overall she does feel that she feels better during the day and has more energy but is still trying to get used to the mask.  She does sometimes still nap for an hour up to an hour and a half.  She does report morning headaches and denies snoring.  She follows up with her PCP for blood pressure management.  She denies any new health problems or medications.    Sleep Study History:  HSS from 11/9/20 indicated severe sleep apnea - AURA 50.8. Significant nocturnal  desaturation - ramon saturation 73% - less than 90% for 36.5% of the TIB. Snorin total snoring episodes/44.3% snoring.     ROS   Denies any recent fevers or chills. No nausea or vomiting. No chest pains or shortness of breath.     Allergies   Allergen Reactions   • Bactrim [Sulfamethoxazole W-Trimethoprim] Itching   • Diclofenac Hives and Itching   • Doxycycline Hives   • Hydrocodone Itching and Vomiting   • Sulfamethizole Itching   • Tetracycline Itching   • Tramadol Itching   • Zpack [Azithromycin] Itching       Current medicines (including changes today)  Current Outpatient Medications   Medication Sig Dispense Refill   • atenolol (TENORMIN) 50 MG Tab TAKE 1 TABLET BY MOUTH IN THE MORNING AND 1/2 (ONE-HALF) IN THE EVENING 135 Tab 3   • hyoscyamine (LEVSIN) 0.125 MG SL Tab DISSOLVE 1 TO 2 TABLETS IN MOUTH EVERY 6 HOURS AS NEEDED FOR PAIN     • EUTHYROX 50 MCG Tab Take  by mouth.     • levothyroxine (SYNTHROID) 50 MCG Tab Take 50 mcg by mouth Every morning on an empty stomach.     • Cyanocobalamin (VITAMIN B-12 PO) Take  by mouth.     • Ibuprofen-diphenhydrAMINE Cit (ADVIL PM PO) Take  by mouth.     • aspirin EC (ECOTRIN) 325 MG Tablet Delayed Response Take 325 mg by mouth every day.     • CALCIUM PO Take  by mouth.     • levothyroxine (SYNTHROID) 137 MCG Tab Take 137 mcg by mouth.     • lisinopril (PRINIVIL) 20 MG Tab Take 20 mg by mouth.     • FLUoxetine (PROZAC) 10 MG Cap Take 10 mg by mouth.     • ipratropium (ATROVENT) 0.06 % Solution USE 2 SPRAY(S) IN EACH NOSTRIL EVERY 12 HOURS AS NEEDED     • lisinopril (PRINIVIL) 10 MG Tab Take 1 Tab by mouth every day. (Patient not taking: Reported on 2020) 30 Tab 2   • gabapentin (NEURONTIN) 100 MG Cap Take 1 Cap by mouth 3 times a day. (Patient not taking: Reported on 2020) 25 Cap 0   • cyclobenzaprine (FLEXERIL) 5 MG tablet Take 1 Tab by mouth 3 times a day as needed for Moderate Pain. (Patient not taking: Reported on 2020) 15 Tab 0   •  levothyroxine (SYNTHROID) 125 MCG Tab Take 1 tablet Monday through Saturday, then 2 tablets on Sunday 90 Tab 3   • Ibuprofen 200 MG Cap Take 1 Cap by mouth 3 times a day. (Patient not taking: Reported on 7/24/2020) 20 Cap 0   • fexofenadine (ALLEGRA) 60 MG Tab Take 1 Tab by mouth every day. (Patient not taking: Reported on 7/24/2020) 15 Tab 0   • fluticasone (FLONASE) 50 MCG/ACT nasal spray Spray 2 Sprays in nose every day. (Patient not taking: Reported on 10/20/2020) 16 g 1   • therapeutic multivitamin-minerals (THERAGRAN-M) Tab Take 1 Tab by mouth every day.     • Probiotic Product (PROBIOTIC DAILY PO) Take  by mouth.     • Glucosamine-Chondroitin (MOVE FREE PO) Take 2 Tabs by mouth every morning.     • omeprazole (PRILOSEC) 20 MG CPDR Take 40 mg by mouth every morning.       No current facility-administered medications for this visit.        Patient Active Problem List    Diagnosis Date Noted   • Arthralgia of right hand 10/19/2018   • Sinus pain 05/16/2018   • Iron deficiency anemia 05/16/2018   • Morbid obesity with BMI of 40.0-44.9, adult (formerly [facility]) 01/04/2018   • Left wrist pain 08/02/2017   • Lunotriquetral ligament tear 08/02/2017   • Palpitations 07/26/2016   • Essential hypertension    • Postoperative hypothyroidism    • Large breasts    • Perimenopausal    • Unspecified disorder of menstruation and other abnormal bleeding from female genital tract 04/11/2014       Family History   Problem Relation Age of Onset   • Hypertension Mother    • Heart Attack Father    • Heart Disease Father    • Alzheimer's Disease Father    • Suicide Attempts Brother    • Diabetes Maternal Grandmother    • Colon Cancer Maternal Grandfather    • Sleep Apnea Neg Hx        She  has a past medical history of Arthritis, Cancer (formerly [facility]) (2000), Chickenpox, Dental disorder, Essential hypertension, Heart burn, Hypothyroidism, Indigestion, Pain (07-), Snoring, and Unspecified disorder of thyroid (1999). She also has no past medical  "history of Irish measles.  She  has a past surgical history that includes lumbar fusion posterior (9/2001); thyroidectomy total (2000); brody by laparoscopy (2005); wrist exploration (1998); primary c section (1987, 1995); egd w/endoscopic ultrasound (11/24/2009); hysteroscopy with video operative (4/11/2014); dilation and curettage (4/11/2014); breast reconstruction (2016); tubal ligation (2013); wrist arthroscopy (Left, 8/2/2017); and ligament repair (8/2/2017).       Objective:   Ht 1.626 m (5' 4\")   Wt (!) 129 kg (285 lb)   LMP 03/30/2014   BMI 48.92 kg/m²     Physical Exam:  Constitutional: Alert, no distress, well-groomed.  Skin: No rashes in visible areas.  Eye: Round. Conjunctiva clear, lids normal. No icterus.   ENMT: Lips pink without lesions, good dentition, moist mucous membranes. Phonation normal.  Neck: Moves freely without pain.  Respiratory: Unlabored respiratory effort, no cough or audible wheeze  Psych: Alert and oriented x3, normal affect and mood.       Assessment and Plan:   The following treatment plan was discussed:     1. ILYA (obstructive sleep apnea)  - Overnight Oximetry; Future    2. Essential hypertension    3. BMI 45.0-49.9, adult (HCC)      Discussed the cardiovascular and neuropsychiatric risks of untreated ILYA; including but not limited to: HTN, DM, MI, ASCVD, CVA, CHF, traffic accidents.     1. First compliance report from 12/30/20-1/28/21 was downloaded and reviewed with the patient which showed autoCPAP 5-15 cmH2O,97% compliance, 6hrs 27 min use, AHI of 1.6. Continue autoCPAP. Patient is compliant and benefiting from autoCPAP therapy for management of ILYA.   2. DME order ([facility]) for mask (small Airfit F30i mask or MOC) and supplies was not needed today. Continue to clean mask and supplies weekly, and change supplies per insurance guidelines.   3. Order OPO on autoCPAP 5-15 cmH2O and RA to r/o nocturnal hypoxia. Will message patient with results when available.   4. Continue " to stay active.   5. Follow up with the appropriate healthcare practitioners for all other medical problems and issues.  6. Sleep hygiene discussed. Recommend keeping a set sleep/wake schedule. Logging enough hours of sleep. Limiting/Avoiding naps. No caffeine after noon and no heavy meals in the evening.   7. Continue to f/u with PCP for BP management.       Follow-up: Return in about 6 months (around 7/29/2021). sooner if needed    YASMIN Benson.    This dictation was created using voice recognition software. The accuracy of the dictation is limited to the abilities of the software. I expect there may be some errors of grammar and possibly content.

## 2021-02-09 ENCOUNTER — HOME STUDY (OUTPATIENT)
Dept: SLEEP MEDICINE | Facility: MEDICAL CENTER | Age: 56
End: 2021-02-09
Attending: NURSE PRACTITIONER
Payer: COMMERCIAL

## 2021-02-09 DIAGNOSIS — G47.33 OSA (OBSTRUCTIVE SLEEP APNEA): ICD-10-CM

## 2021-02-12 PROCEDURE — 94762 N-INVAS EAR/PLS OXIMTRY CONT: CPT | Performed by: FAMILY MEDICINE

## 2021-02-12 NOTE — PROCEDURES
Over Night Pulse Oximetry     Indication:To assess the efficacy of the current pressure .       Impression:   The study was done on CPAP 5-15 cm. The total analyzed time was 6 hrs 9 min. O2 Sat. ramon was 77% and mean O2 sat was 87 % and baseline O2 at 88%. O2 sat was below 88% for 121 min of the flow evaluation time. Oxygen Desaturation (>=3%) Index was elevated at 9.9/hr.      Recommendation:  Residual nocturnal hypoxia, consider O2 bleed in. Clinical correlation recommended.

## 2021-02-15 ENCOUNTER — TELEPHONE (OUTPATIENT)
Dept: SLEEP MEDICINE | Facility: MEDICAL CENTER | Age: 56
End: 2021-02-15

## 2021-02-15 DIAGNOSIS — G47.33 OSA (OBSTRUCTIVE SLEEP APNEA): ICD-10-CM

## 2021-02-15 NOTE — TELEPHONE ENCOUNTER
Order is placed for new O2 set up 2L bleed in based on recent OPO study results. Please fax the order.She is currently using an autoCPAP. Order repeat OPO on autoCPAP 5-15 cmH2O and 2L O2 bleed in at next office visit.  Patient already has a f/u scheduled in July with Dr. Porras.     Thank you  YASMIN Benson.

## 2021-03-15 DIAGNOSIS — Z23 NEED FOR VACCINATION: ICD-10-CM

## 2021-07-06 ENCOUNTER — OFFICE VISIT (OUTPATIENT)
Dept: SLEEP MEDICINE | Facility: MEDICAL CENTER | Age: 56
End: 2021-07-06
Payer: OTHER MISCELLANEOUS

## 2021-07-06 VITALS
BODY MASS INDEX: 45.16 KG/M2 | HEIGHT: 64 IN | WEIGHT: 264.5 LBS | RESPIRATION RATE: 16 BRPM | DIASTOLIC BLOOD PRESSURE: 84 MMHG | OXYGEN SATURATION: 97 % | HEART RATE: 53 BPM | SYSTOLIC BLOOD PRESSURE: 124 MMHG

## 2021-07-06 DIAGNOSIS — G47.34 NOCTURNAL HYPOXIA: ICD-10-CM

## 2021-07-06 DIAGNOSIS — G47.33 OSA (OBSTRUCTIVE SLEEP APNEA): ICD-10-CM

## 2021-07-06 PROCEDURE — 99214 OFFICE O/P EST MOD 30 MIN: CPT | Performed by: FAMILY MEDICINE

## 2021-07-06 NOTE — PROGRESS NOTES
[facility] Follow Up Note     Date: 7/6/2021 / Time: 8:13 AM    Patient ID:   Name:             Luann Morales   YOB: 1965  Age:                 55 y.o.  female   MRN:               [id number]      Thank you for requesting a sleep medicine consultation on Luann Morales at the sleep center. She presents today with the chief complaints of ILYA and nocturnal hypoxia. follow up. PMH includes hypertension, postoperative hypothyroidism, palpitations, iron deficiency anemia, morbid obesity, cancer, former smoker 0.5 packs/day for 5 years quit in 1/1/96, GERD, indigestion.    HISTORY OF PRESENT ILLNESS:       Pt is currently on autoCPAP 5-15 cm H2O with O2 bleed in at 2LPM. She goes to sleep around 11 pm and wakes up around 7 am.She is getting about 7 hrs of sleep on a good night. Overall, she does  finds her sleep refreshing.She does take regular naps. She denies any symptoms of RLS, narcolepsy or any symptoms to suggest parasomnias such as nightmares, sleep walking or acting out of dreams.  She is using CPAP most days of the week. Pt reports 7 hrs of average nightly use of CPAP. Pt denies snoring, gasping,choking.Pt also denies significant mask leak that is interfering with sleep. The 30 day compliance was downloaded which shows adequate compliance with more that 4 hr usage about 97%. The AHI is has improved to 1.0/hr. The mask leak is abnormal. The symptoms of excessive daytime, snoring and gasping has improved.         SLEEP HISTORY   OPO: The study was done on CPAP 5-15 cm. The total analyzed time was 6 hrs 9 min. O2 Sat. ramon was 77% and mean O2 sat was 87 % and baseline O2 at 88%. O2 sat was below 88% for 121 min of the flow evaluation time. Oxygen Desaturation (>=3%) Index was elevated at 9.9/hr.     ECHO from 5/1/2020 showed EF of 55% and no significant valve abnormalities.    HSS from 11/9/20 indicated severe sleep apnea - AURA 50.8. Significant nocturnal desaturation - ramon  saturation 73% - less than 90% for 36.5% of the TIB. Snorin total snoring episodes/44.3% snoring.     REVIEW OF SYSTEMS:       Constitutional: Denies fevers, Denies weight changes  Eyes: Denies changes in vision, no eye pain  Ears/Nose/Throat/Mouth: Denies nasal congestion or sore throat   Cardiovascular: Denies chest pain or palpitations   Respiratory: Denies shortness of breath , Denies cough  Skin/Breast: Denies rash,   Neurological: Denies headache, confusion, memory loss or focal weakness/parasthesias  Psychiatric: denies mood disorder   Sleep: denies snoring and improved EDS    Comprehensive review of systems form is reviewed with the patient and is attached in the EMR.     PMH:  has a past medical history of Arthritis, Cancer (HCC) (), Chickenpox, Dental disorder, Essential hypertension, Heart burn, Hypothyroidism, Indigestion, Pain (2017), Snoring, and Unspecified disorder of thyroid (). She also has no past medical history of [place] measles.  MEDS:   Current Outpatient Medications:   •  meloxicam (MOBIC) 15 MG tablet, Take 1 tablet by mouth every day., Disp: 30 tablet, Rfl: 1  •  atenolol (TENORMIN) 50 MG Tab, TAKE 1 TABLET BY MOUTH IN THE MORNING AND 1/2 (ONE-HALF) IN THE EVENING, Disp: 135 Tab, Rfl: 3  •  Cyanocobalamin (VITAMIN B-12 PO), Take  by mouth., Disp: , Rfl:   •  aspirin EC (ECOTRIN) 325 MG Tablet Delayed Response, Take 325 mg by mouth every day., Disp: , Rfl:   •  lisinopril (PRINIVIL) 20 MG Tab, Take 20 mg by mouth., Disp: , Rfl:   •  FLUoxetine (PROZAC) 10 MG Cap, Take 10 mg by mouth., Disp: , Rfl:   •  levothyroxine (SYNTHROID) 125 MCG Tab, Take 1 tablet Monday through Saturday, then 2 tablets on , Disp: 90 Tab, Rfl: 3  •  omeprazole (PRILOSEC) 20 MG CPDR, Take 40 mg by mouth every morning., Disp: , Rfl:   •  hyoscyamine (LEVSIN) 0.125 MG SL Tab, DISSOLVE 1 TO 2 TABLETS IN MOUTH EVERY 6 HOURS AS NEEDED FOR PAIN, Disp: , Rfl:   •  EUTHYROX 50 MCG Tab, Take  by  mouth., Disp: , Rfl:   •  levothyroxine (SYNTHROID) 50 MCG Tab, Take 50 mcg by mouth Every morning on an empty stomach., Disp: , Rfl:   •  Ibuprofen-diphenhydrAMINE Cit (ADVIL PM PO), Take  by mouth., Disp: , Rfl:   •  CALCIUM PO, Take  by mouth. (Patient not taking: Reported on 7/6/2021), Disp: , Rfl:   •  levothyroxine (SYNTHROID) 137 MCG Tab, Take 137 mcg by mouth., Disp: , Rfl:   •  ipratropium (ATROVENT) 0.06 % Solution, USE 2 SPRAY(S) IN EACH NOSTRIL EVERY 12 HOURS AS NEEDED, Disp: , Rfl:   •  lisinopril (PRINIVIL) 10 MG Tab, Take 1 Tab by mouth every day. (Patient not taking: Reported on 7/24/2020), Disp: 30 Tab, Rfl: 2  •  gabapentin (NEURONTIN) 100 MG Cap, Take 1 Cap by mouth 3 times a day. (Patient not taking: Reported on 7/24/2020), Disp: 25 Cap, Rfl: 0  •  cyclobenzaprine (FLEXERIL) 5 MG tablet, Take 1 Tab by mouth 3 times a day as needed for Moderate Pain. (Patient not taking: Reported on 7/24/2020), Disp: 15 Tab, Rfl: 0  •  Ibuprofen 200 MG Cap, Take 1 Cap by mouth 3 times a day. (Patient not taking: Reported on 7/24/2020), Disp: 20 Cap, Rfl: 0  •  fexofenadine (ALLEGRA) 60 MG Tab, Take 1 Tab by mouth every day. (Patient not taking: Reported on 7/24/2020), Disp: 15 Tab, Rfl: 0  •  fluticasone (FLONASE) 50 MCG/ACT nasal spray, Spray 2 Sprays in nose every day. (Patient not taking: Reported on 10/20/2020), Disp: 16 g, Rfl: 1  •  therapeutic multivitamin-minerals (THERAGRAN-M) Tab, Take 1 Tab by mouth every day. (Patient not taking: Reported on 7/6/2021), Disp: , Rfl:   •  Probiotic Product (PROBIOTIC DAILY PO), Take  by mouth. (Patient not taking: Reported on 7/6/2021), Disp: , Rfl:   •  Glucosamine-Chondroitin (MOVE FREE PO), Take 2 Tabs by mouth every morning., Disp: , Rfl:   ALLERGIES:   Allergies   Allergen Reactions   • Bactrim [Sulfamethoxazole W-Trimethoprim] Itching   • Diclofenac Hives and Itching   • Doxycycline Hives   • Hydrocodone Itching and Vomiting   • Sulfamethizole Itching   •  "Tetracycline Itching   • Tramadol Itching   • Zpack [Azithromycin] Itching     SURGHX:   Past Surgical History:   Procedure Laterality Date   • WRIST ARTHROSCOPY Left 2017    Procedure: WRIST ARTHROSCOPY TRIANGULAR FIBROCARTILAGE COMPLEX DEBRIDEMENT;  Surgeon: Davion Grey M.D.;  Location: SURGERY [facility];  Service:    • LIGAMENT REPAIR  2017    Procedure: LIGAMENT REPAIR ULNOTRIQUETRAL ;  Surgeon: Davion Grey M.D.;  Location: SURGERY [facility];  Service:    • BREAST RECONSTRUCTION  2016    reduction   • HYSTEROSCOPY WITH VIDEO OPERATIVE  2014    Performed by Pepe Deleon M.D. at SURGERY SAME DAY [facility]   • DILATION AND CURETTAGE  2014    Performed by Pepe Deleon M.D. at SURGERY SAME DAY [facility]   • TUBAL LIGATION     • EGD W/ENDOSCOPIC ULTRASOUND  2009    Performed by YARELI ANDERS at SURGERY [facility]   • ANIBAL BY LAPAROSCOPY     • LUMBAR FUSION POSTERIOR  2001   • THYROIDECTOMY TOTAL     • WRIST EXPLORATION      right; excision bone   • PRIMARY C SECTION  ,          SOCHX:  reports that she quit smoking about 25 years ago. Her smoking use included cigarettes. She has a 2.50 pack-year smoking history. She has never used smokeless tobacco. She reports current alcohol use. She reports that she does not use drugs..  FH:   Family History   Problem Relation Age of Onset   • Hypertension Mother    • Heart Attack Father    • Heart Disease Father    • Alzheimer's Disease Father    • Suicide Attempts Brother    • Diabetes Maternal Grandmother    • Colon Cancer Maternal Grandfather    • Sleep Apnea Neg Hx          Physical Exam:  Vitals/ General Appearance:   Weight/BMI: Body mass index is 45.4 kg/m².  /84 (BP Location: Right arm, Patient Position: Sitting, BP Cuff Size: Adult)   Pulse (!) 53   Resp 16   Ht 1.626 m (5' 4\")   Wt 120 kg (264 lb 8 oz)   SpO2 97%   Vitals:    21 0804 " "  BP: 124/84   BP Location: Right arm   Patient Position: Sitting   BP Cuff Size: Adult   Pulse: (!) 53   Resp: 16   SpO2: 97%   Weight: 120 kg (264 lb 8 oz)   Height: 1.626 m (5' 4\")       Pt. is alert and oriented to time, place and person. Cooperative and in no apparent distress.       Constitutional: Alert, no distress, well-groomed.  Skin: No rashes in visible areas.  Eye: Round. Conjunctiva clear, lids normal. No icterus.   ENMT: Lips pink without lesions, good dentition, moist mucous membranes. Phonation normal.  Neck: No masses, no thyromegaly. Moves freely without pain.  CV: Pulse as reported by patient  Respiratory: Unlabored respiratory effort, no cough or audible wheeze  Psych: Alert and oriented x3, normal affect and mood.     ASSESSMENT AND PLAN     1. Sleep Apnea .   The pathophysiology of sleep anea and the increased risk of cardiovascular morbidity from untreated sleep apnea is discussed in detail with the patient.She is urged to avoid supine sleep, weight gain and alcoholic beverages since all of these can worsen sleep apnea. She is cautioned against drowsy driving. If She feels sleepy while driving, She must pull over for a break/nap, rather than persist on the road, in the interest of She own safety and that of others on the road.   Plan   - Continue ACPAP 5-15 cm with FFM   - OPO is ordered today    - Simplus FFM  is ordered due to mask leak    - compliance download was reviewed and discussed with the pt   - compliance was reinforced     2. Regarding treatment of other past medical problems and general health maintenance,  She is urged to follow up with PCP.    "

## 2021-07-06 NOTE — PATIENT INSTRUCTIONS
"CPAP and BPAP Information  CPAP and BPAP are methods of helping a person breathe with the use of air pressure. CPAP stands for \"continuous positive airway pressure.\" BPAP stands for \"bi-level positive airway pressure.\" In both methods, air is blown through your nose or mouth and into your air passages to help you breathe well.  CPAP and BPAP use different amounts of pressure to blow air. With CPAP, the amount of pressure stays the same while you breathe in and out. With BPAP, the amount of pressure is increased when you breathe in (inhale) so that you can take larger breaths. Your health care provider will recommend whether CPAP or BPAP would be more helpful for you.  Why are CPAP and BPAP treatments used?  CPAP or BPAP can be helpful if you have:  · Sleep apnea.  · Chronic obstructive pulmonary disease (COPD).  · Heart failure.  · Medical conditions that weaken the muscles of the chest including muscular dystrophy, or neurological diseases such as amyotrophic lateral sclerosis (ALS).  · Other problems that cause breathing to be weak, abnormal, or difficult.  CPAP is most commonly used for obstructive sleep apnea (ILYA) to keep the airways from collapsing when the muscles relax during sleep.  How is CPAP or BPAP administered?  Both CPAP and BPAP are provided by a small machine with a flexible plastic tube that attaches to a plastic mask. You wear the mask. Air is blown through the mask into your nose or mouth. The amount of pressure that is used to blow the air can be adjusted on the machine. Your health care provider will determine the pressure setting that should be used based on your individual needs.  When should CPAP or BPAP be used?  In most cases, the mask only needs to be worn during sleep. Generally, the mask needs to be worn throughout the night and during any daytime naps. People with certain medical conditions may also need to wear the mask at other times when they are awake. Follow instructions from your " health care provider about when to use the machine.  What are some tips for using the mask?    · Because the mask needs to be snug, some people feel trapped or closed-in (claustrophobic) when first using the mask. If you feel this way, you may need to get used to the mask. One way to do this is by holding the mask loosely over your nose or mouth and then gradually applying the mask more snugly. You can also gradually increase the amount of time that you use the mask.  · Masks are available in various types and sizes. Some fit over your mouth and nose while others fit over just your nose. If your mask does not fit well, talk with your health care provider about getting a different one.  · If you are using a mask that fits over your nose and you tend to breathe through your mouth, a chin strap may be applied to help keep your mouth closed.  · The CPAP and BPAP machines have alarms that may sound if the mask comes off or develops a leak.  · If you have trouble with the mask, it is very important that you talk with your health care provider about finding a way to make the mask easier to tolerate. Do not stop using the mask. Stopping the use of the mask could have a negative impact on your health.  What are some tips for using the machine?  · Place your CPAP or BPAP machine on a secure table or stand near an electrical outlet.  · Know where the on/off switch is located on the machine.  · Follow instructions from your health care provider about how to set the pressure on your machine and when you should use it.  · Do not eat or drink while the CPAP or BPAP machine is on. Food or fluids could get pushed into your lungs by the pressure of the CPAP or BPAP.  · Do not smoke. Tobacco smoke residue can damage the machine.  · For home use, CPAP and BPAP machines can be rented or purchased through home health care companies. Many different brands of machines are available. Renting a machine before purchasing may help you find out  which particular machine works well for you.  · Keep the CPAP or BPAP machine and attachments clean. Ask your health care provider for specific instructions.  Get help right away if:  · You have redness or open areas around your nose or mouth where the mask fits.  · You have trouble using the CPAP or BPAP machine.  · You cannot tolerate wearing the CPAP or BPAP mask.  · You have pain, discomfort, and bloating in your abdomen.  Summary  · CPAP and BPAP are methods of helping a person breathe with the use of air pressure.  · Both CPAP and BPAP are provided by a small machine with a flexible plastic tube that attaches to a plastic mask.  · If you have trouble with the mask, it is very important that you talk with your health care provider about finding a way to make the mask easier to tolerate.  This information is not intended to replace advice given to you by your health care provider. Make sure you discuss any questions you have with your health care provider.  Document Released: 09/15/2005 Document Revised: 04/08/2020 Document Reviewed: 11/06/2017  Elsevier Patient Education © 2020 [employer]

## 2021-08-24 DIAGNOSIS — R00.2 PALPITATIONS: ICD-10-CM

## 2021-08-24 DIAGNOSIS — I10 ESSENTIAL HYPERTENSION: ICD-10-CM

## 2021-08-24 NOTE — LETTER
August 25, 2021        Luann Morales  [address] [place] [address]        Dear Luann:  Tommie Kong,    We received a medication refill request, and it looks like you're due for an annual visit and annual labs. In order to continue filling your medications safely, please call [phone] to schedule a visit with Dr. Roldan and have labs drawn 1-2 weeks prior to the appointment.     Let us know if you have any questions.    Thank you,  Ariana HERNADEZ    Electronically Signed

## 2021-08-25 ENCOUNTER — TELEPHONE (OUTPATIENT)
Dept: CARDIOLOGY | Facility: MEDICAL CENTER | Age: 56
End: 2021-08-25

## 2021-08-25 ENCOUNTER — HOME STUDY (OUTPATIENT)
Dept: SLEEP MEDICINE | Facility: MEDICAL CENTER | Age: 56
End: 2021-08-25
Attending: FAMILY MEDICINE
Payer: COMMERCIAL

## 2021-08-25 DIAGNOSIS — G47.33 OSA (OBSTRUCTIVE SLEEP APNEA): ICD-10-CM

## 2021-08-25 DIAGNOSIS — G47.34 NOCTURNAL HYPOXIA: ICD-10-CM

## 2021-08-25 RX ORDER — ATENOLOL 50 MG/1
TABLET ORAL
Qty: 135 TABLET | Refills: 0 | Status: SHIPPED | OUTPATIENT
Start: 2021-08-25 | End: 2021-08-31 | Stop reason: SDUPTHER

## 2021-08-25 NOTE — TELEPHONE ENCOUNTER
----- Message from David Shane Ass't sent at 8/25/2021  8:23 AM PDT -----  Regarding: Follow-up  Patient is due for a follow up with TW, please contact patient to schedule an appointment for further refills of their medications.    Thank you,  Lainey Hernandez Ass't

## 2021-08-26 ENCOUNTER — PATIENT MESSAGE (OUTPATIENT)
Dept: SLEEP MEDICINE | Facility: MEDICAL CENTER | Age: 56
End: 2021-08-26

## 2021-08-26 DIAGNOSIS — G47.33 OSA (OBSTRUCTIVE SLEEP APNEA): ICD-10-CM

## 2021-08-26 PROCEDURE — 94762 N-INVAS EAR/PLS OXIMTRY CONT: CPT | Performed by: FAMILY MEDICINE

## 2021-08-27 NOTE — PROCEDURES
Over Night Pulse Oximetry     Indication:To assess the efficacy of the current pressure.       Impression:   The study was done on CPAP 5 to 15 cm with O2 bleed in at 2 L/min.. The total analyzed time was 7 hrs 28 min. O2 Sat. ramon was 80% and mean O2 sat was 89% and baseline O2 at 92%. O2 sat was below 88% for 13.9 min of the flow evaluation time. Oxygen Desaturation (>=3%) Index was elevated at 10/hr.      Recommendation:  Consider increasing base EPAP pressure due to elevated VALARIE and low O2 ramon. Clinical correlation recommended.

## 2021-08-30 NOTE — PATIENT COMMUNICATION
Order for pressure change was faxed to [facility] and I also change the pressure wirelessly on resmed

## 2021-08-31 DIAGNOSIS — R00.2 PALPITATIONS: ICD-10-CM

## 2021-08-31 DIAGNOSIS — I10 ESSENTIAL HYPERTENSION: ICD-10-CM

## 2021-08-31 RX ORDER — ATENOLOL 50 MG/1
TABLET ORAL
Qty: 135 TABLET | Refills: 0 | Status: SHIPPED | OUTPATIENT
Start: 2021-08-31 | End: 2021-10-07 | Stop reason: SDUPTHER

## 2021-09-24 ENCOUNTER — APPOINTMENT (OUTPATIENT)
Dept: SLEEP MEDICINE | Facility: MEDICAL CENTER | Age: 56
End: 2021-09-24
Payer: COMMERCIAL

## 2021-10-01 ENCOUNTER — HOSPITAL ENCOUNTER (OUTPATIENT)
Dept: LAB | Facility: MEDICAL CENTER | Age: 56
End: 2021-10-01
Attending: INTERNAL MEDICINE
Payer: COMMERCIAL

## 2021-10-01 ENCOUNTER — HOSPITAL ENCOUNTER (OUTPATIENT)
Dept: LAB | Facility: MEDICAL CENTER | Age: 56
End: 2021-10-01
Attending: FAMILY MEDICINE
Payer: COMMERCIAL

## 2021-10-01 DIAGNOSIS — R00.2 PALPITATIONS: ICD-10-CM

## 2021-10-01 DIAGNOSIS — I10 ESSENTIAL HYPERTENSION: ICD-10-CM

## 2021-10-01 LAB
ALBUMIN SERPL BCP-MCNC: 4 G/DL (ref 3.2–4.9)
ALBUMIN/GLOB SERPL: 1.5 G/DL
ALP SERPL-CCNC: 123 U/L (ref 30–99)
ALT SERPL-CCNC: 24 U/L (ref 2–50)
ANION GAP SERPL CALC-SCNC: 10 MMOL/L (ref 7–16)
ANION GAP SERPL CALC-SCNC: 10 MMOL/L (ref 7–16)
APPEARANCE UR: ABNORMAL
AST SERPL-CCNC: 21 U/L (ref 12–45)
BACTERIA #/AREA URNS HPF: NEGATIVE /HPF
BASOPHILS # BLD AUTO: 0.8 % (ref 0–1.8)
BASOPHILS # BLD: 0.03 K/UL (ref 0–0.12)
BILIRUB SERPL-MCNC: <0.2 MG/DL (ref 0.1–1.5)
BILIRUB UR QL STRIP.AUTO: NEGATIVE
BUN SERPL-MCNC: 22 MG/DL (ref 8–22)
BUN SERPL-MCNC: 22 MG/DL (ref 8–22)
CALCIUM SERPL-MCNC: 9.1 MG/DL (ref 8.5–10.5)
CALCIUM SERPL-MCNC: 9.1 MG/DL (ref 8.5–10.5)
CHLORIDE SERPL-SCNC: 104 MMOL/L (ref 96–112)
CHLORIDE SERPL-SCNC: 104 MMOL/L (ref 96–112)
CHOLEST SERPL-MCNC: 182 MG/DL (ref 100–199)
CO2 SERPL-SCNC: 23 MMOL/L (ref 20–33)
CO2 SERPL-SCNC: 24 MMOL/L (ref 20–33)
COLOR UR: YELLOW
CREAT SERPL-MCNC: 1.07 MG/DL (ref 0.5–1.4)
CREAT SERPL-MCNC: 1.12 MG/DL (ref 0.5–1.4)
EOSINOPHIL # BLD AUTO: 0.14 K/UL (ref 0–0.51)
EOSINOPHIL NFR BLD: 3.5 % (ref 0–6.9)
EPI CELLS #/AREA URNS HPF: NEGATIVE /HPF
ERYTHROCYTE [DISTWIDTH] IN BLOOD BY AUTOMATED COUNT: 50.9 FL (ref 35.9–50)
EST. AVERAGE GLUCOSE BLD GHB EST-MCNC: 126 MG/DL
FASTING STATUS PATIENT QL REPORTED: NORMAL
GLOBULIN SER CALC-MCNC: 2.7 G/DL (ref 1.9–3.5)
GLUCOSE SERPL-MCNC: 92 MG/DL (ref 65–99)
GLUCOSE SERPL-MCNC: 95 MG/DL (ref 65–99)
GLUCOSE UR STRIP.AUTO-MCNC: NEGATIVE MG/DL
HBA1C MFR BLD: 6 % (ref 4–5.6)
HCT VFR BLD AUTO: 37.7 % (ref 37–47)
HDLC SERPL-MCNC: 57 MG/DL
HGB BLD-MCNC: 11.9 G/DL (ref 12–16)
HYALINE CASTS #/AREA URNS LPF: ABNORMAL /LPF
IMM GRANULOCYTES # BLD AUTO: 0.02 K/UL (ref 0–0.11)
IMM GRANULOCYTES NFR BLD AUTO: 0.5 % (ref 0–0.9)
KETONES UR STRIP.AUTO-MCNC: NEGATIVE MG/DL
LDLC SERPL CALC-MCNC: 103 MG/DL
LEUKOCYTE ESTERASE UR QL STRIP.AUTO: ABNORMAL
LYMPHOCYTES # BLD AUTO: 1.04 K/UL (ref 1–4.8)
LYMPHOCYTES NFR BLD: 26.1 % (ref 22–41)
MCH RBC QN AUTO: 29.8 PG (ref 27–33)
MCHC RBC AUTO-ENTMCNC: 31.6 G/DL (ref 33.6–35)
MCV RBC AUTO: 94.3 FL (ref 81.4–97.8)
MICRO URNS: ABNORMAL
MONOCYTES # BLD AUTO: 0.46 K/UL (ref 0–0.85)
MONOCYTES NFR BLD AUTO: 11.5 % (ref 0–13.4)
NEUTROPHILS # BLD AUTO: 2.3 K/UL (ref 2–7.15)
NEUTROPHILS NFR BLD: 57.6 % (ref 44–72)
NITRITE UR QL STRIP.AUTO: NEGATIVE
NRBC # BLD AUTO: 0 K/UL
NRBC BLD-RTO: 0 /100 WBC
PH UR STRIP.AUTO: 7 [PH] (ref 5–8)
PLATELET # BLD AUTO: 183 K/UL (ref 164–446)
PMV BLD AUTO: 10.1 FL (ref 9–12.9)
POTASSIUM SERPL-SCNC: 4.5 MMOL/L (ref 3.6–5.5)
POTASSIUM SERPL-SCNC: 4.5 MMOL/L (ref 3.6–5.5)
PROT SERPL-MCNC: 6.7 G/DL (ref 6–8.2)
PROT UR QL STRIP: NEGATIVE MG/DL
RBC # BLD AUTO: 4 M/UL (ref 4.2–5.4)
RBC # URNS HPF: ABNORMAL /HPF
RBC UR QL AUTO: ABNORMAL
SODIUM SERPL-SCNC: 137 MMOL/L (ref 135–145)
SODIUM SERPL-SCNC: 138 MMOL/L (ref 135–145)
SP GR UR STRIP.AUTO: 1.01
TRIGL SERPL-MCNC: 111 MG/DL (ref 0–149)
TSH SERPL DL<=0.005 MIU/L-ACNC: 2.87 UIU/ML (ref 0.38–5.33)
UROBILINOGEN UR STRIP.AUTO-MCNC: 0.2 MG/DL
WBC # BLD AUTO: 4 K/UL (ref 4.8–10.8)
WBC #/AREA URNS HPF: ABNORMAL /HPF

## 2021-10-01 PROCEDURE — 80061 LIPID PANEL: CPT

## 2021-10-01 PROCEDURE — 83036 HEMOGLOBIN GLYCOSYLATED A1C: CPT

## 2021-10-01 PROCEDURE — 80053 COMPREHEN METABOLIC PANEL: CPT

## 2021-10-01 PROCEDURE — 36415 COLL VENOUS BLD VENIPUNCTURE: CPT

## 2021-10-01 PROCEDURE — 84443 ASSAY THYROID STIM HORMONE: CPT

## 2021-10-01 PROCEDURE — 85025 COMPLETE CBC W/AUTO DIFF WBC: CPT

## 2021-10-01 PROCEDURE — 81001 URINALYSIS AUTO W/SCOPE: CPT

## 2021-10-01 PROCEDURE — 80048 BASIC METABOLIC PNL TOTAL CA: CPT

## 2021-10-05 NOTE — PROGRESS NOTES
Chief Complaint   Patient presents with   • Palpitations   • Hypertension     F/V Dx: Essential hypertension       Subjective     Luann Morales is a 56 y.o. female who presents today for follow up.     She is followed Dr Roldan in our clinic, history sleep apnea currently untreated, thyroid cancer with radiation/ surgery, essential hypertension well treated and palpitations.     Last seen 7/2020 by Dr. Roldan. Echo was normal and unchanged ECG. Concerned benign palpitations and stress triggers.     Overall, patient is doing well. She occasionally gets palpitations relieved with atenolol. Denies any chest pain, sob or dizziness. Mild LE edema noted.     Past Medical History:   Diagnosis Date   • Arthritis      knees   • Cancer (HCC) 2000    thyroid ca   • Chickenpox    • Dental disorder     upper/lower partials   • Essential hypertension    • Heart burn    • Hypothyroidism    • Indigestion    • Pain 07-    left wrist, 6/10   • Snoring     no sleep study   • Unspecified disorder of thyroid 1999    thyroid cancer w/ radiation/surgery     Past Surgical History:   Procedure Laterality Date   • WRIST ARTHROSCOPY Left 8/2/2017    Procedure: WRIST ARTHROSCOPY TRIANGULAR FIBROCARTILAGE COMPLEX DEBRIDEMENT;  Surgeon: Davion Grey M.D.;  Location: SURGERY [facility];  Service:    • LIGAMENT REPAIR  8/2/2017    Procedure: LIGAMENT REPAIR ULNOTRIQUETRAL ;  Surgeon: Davion Grey M.D.;  Location: [facility];  Service:    • BREAST RECONSTRUCTION  2016    reduction   • HYSTEROSCOPY WITH VIDEO OPERATIVE  4/11/2014    Performed by Pepe Deleon M.D. at SURGERY SAME DAY [facility]   • DILATION AND CURETTAGE  4/11/2014    Performed by Pepe Deleon M.D. at SURGERY SAME DAY [facility]   • TUBAL LIGATION  2013   • EGD W/ENDOSCOPIC ULTRASOUND  11/24/2009    Performed by YARELI ANDERS at SURGERY [facility]   • ANIBAL BY LAPAROSCOPY  2005   • LUMBAR FUSION POSTERIOR   2001   • THYROIDECTOMY TOTAL     • WRIST EXPLORATION      right; excision bone   • PRIMARY C SECTION  ,          Family History   Problem Relation Age of Onset   • Hypertension Mother    • Heart Attack Father    • Heart Disease Father    • Alzheimer's Disease Father    • Suicide Attempts Brother    • Diabetes Maternal Grandmother    • Colon Cancer Maternal Grandfather    • Sleep Apnea Neg Hx      Social History     Socioeconomic History   • Marital status:      Spouse name: Not on file   • Number of children: Not on file   • Years of education: Not on file   • Highest education level: Not on file   Occupational History   • Not on file   Tobacco Use   • Smoking status: Former Smoker     Packs/day: 0.50     Years: 5.00     Pack years: 2.50     Types: Cigarettes     Quit date: 1996     Years since quittin.7   • Smokeless tobacco: Never Used   • Tobacco comment:    Vaping Use   • Vaping Use: Never used   Substance and Sexual Activity   • Alcohol use: Yes     Alcohol/week: 1.8 oz     Types: 1 Glasses of wine, 1 Shots of liquor, 1 Standard drinks or equivalent per week     Comment: occ    • Drug use: No   • Sexual activity: Not on file   Other Topics Concern   • Not on file   Social History Narrative   • Not on file     Social Determinants of Health     Financial Resource Strain:    • Difficulty of Paying Living Expenses:    Food Insecurity:    • Worried About Running Out of Food in the Last Year:    • Ran Out of Food in the Last Year:    Transportation Needs:    • Lack of Transportation (Medical):    • Lack of Transportation (Non-Medical):    Physical Activity:    • Days of Exercise per Week:    • Minutes of Exercise per Session:    Stress:    • Feeling of Stress :    Social Connections:    • Frequency of Communication with Friends and Family:    • Frequency of Social Gatherings with Friends and Family:    • Attends Hinduism Services:    • Active Member of Clubs or  Organizations:    • Attends Club or Organization Meetings:    • Marital Status:    Intimate Partner Violence:    • Fear of Current or Ex-Partner:    • Emotionally Abused:    • Physically Abused:    • Sexually Abused:      Allergies   Allergen Reactions   • Bactrim [Sulfamethoxazole W-Trimethoprim] Itching   • Diclofenac Hives and Itching   • Doxycycline Hives   • Hydrocodone Itching and Vomiting   • Sulfamethizole Itching   • Tetracycline Itching   • Tramadol Itching   • Zpack [Azithromycin] Itching     Outpatient Encounter Medications as of 10/7/2021   Medication Sig Dispense Refill   • atenolol (TENORMIN) 50 MG Tab TAKE 1 TABLET BY MOUTH IN THE MORNING AND 1/2 AT NIGHT 135 Tablet 3   • meloxicam (MOBIC) 15 MG tablet Take 1 tablet by mouth every day. 30 tablet 1   • hyoscyamine (LEVSIN) 0.125 MG SL Tab DISSOLVE 1 TO 2 TABLETS IN MOUTH EVERY 6 HOURS AS NEEDED FOR PAIN     • Cyanocobalamin (VITAMIN B-12 PO) Take  by mouth.     • aspirin EC (ECOTRIN) 325 MG Tablet Delayed Response Take 325 mg by mouth every day.     • lisinopril (PRINIVIL) 20 MG Tab Take 20 mg by mouth.     • FLUoxetine (PROZAC) 10 MG Cap Take 10 mg by mouth.     • levothyroxine (SYNTHROID) 125 MCG Tab Take 1 tablet Monday through Saturday, then 2 tablets on Sunday (Patient taking differently: Take 1 tablet Monday through Sunday) 90 Tab 3   • Glucosamine-Chondroitin (MOVE FREE PO) Take 2 Tabs by mouth every morning.     • omeprazole (PRILOSEC) 20 MG CPDR Take 40 mg by mouth every morning.     • [DISCONTINUED] meloxicam (MOBIC) 15 MG tablet Take 1 Tablet by mouth every day. (Patient not taking: Reported on 10/7/2021) 30 Tablet 1   • [DISCONTINUED] atenolol (TENORMIN) 50 MG Tab TAKE 1 TABLET BY MOUTH IN THE MORNING AND 1/2 (ONE-HALF) IN THE EVENING (Patient taking differently: TAKE 1 TABLET BY MOUTH IN THE MORNING AND 1/2 AT NIGHT) 135 Tablet 0   • [DISCONTINUED] EUTHYROX 50 MCG Tab Take  by mouth.     • [DISCONTINUED] levothyroxine (SYNTHROID) 50 MCG  Tab Take 50 mcg by mouth Every morning on an empty stomach.     • [DISCONTINUED] Ibuprofen-diphenhydrAMINE Cit (ADVIL PM PO) Take  by mouth. (Patient not taking: Reported on 10/7/2021)     • [DISCONTINUED] CALCIUM PO Take  by mouth. (Patient not taking: Reported on 7/6/2021)     • [DISCONTINUED] levothyroxine (SYNTHROID) 137 MCG Tab Take 137 mcg by mouth.     • [DISCONTINUED] ipratropium (ATROVENT) 0.06 % Solution USE 2 SPRAY(S) IN EACH NOSTRIL EVERY 12 HOURS AS NEEDED     • [DISCONTINUED] lisinopril (PRINIVIL) 10 MG Tab Take 1 Tab by mouth every day. (Patient not taking: Reported on 10/7/2021) 30 Tab 2   • [DISCONTINUED] gabapentin (NEURONTIN) 100 MG Cap Take 1 Cap by mouth 3 times a day. (Patient not taking: Reported on 7/24/2020) 25 Cap 0   • [DISCONTINUED] cyclobenzaprine (FLEXERIL) 5 MG tablet Take 1 Tab by mouth 3 times a day as needed for Moderate Pain. (Patient not taking: Reported on 7/24/2020) 15 Tab 0   • [DISCONTINUED] Ibuprofen 200 MG Cap Take 1 Cap by mouth 3 times a day. (Patient not taking: Reported on 7/24/2020) 20 Cap 0   • [DISCONTINUED] fexofenadine (ALLEGRA) 60 MG Tab Take 1 Tab by mouth every day. (Patient not taking: Reported on 7/24/2020) 15 Tab 0   • [DISCONTINUED] fluticasone (FLONASE) 50 MCG/ACT nasal spray Spray 2 Sprays in nose every day. (Patient not taking: Reported on 10/20/2020) 16 g 1   • [DISCONTINUED] therapeutic multivitamin-minerals (THERAGRAN-M) Tab Take 1 Tab by mouth every day. (Patient not taking: Reported on 7/6/2021)     • [DISCONTINUED] Probiotic Product (PROBIOTIC DAILY PO) Take  by mouth. (Patient not taking: Reported on 7/6/2021)       No facility-administered encounter medications on file as of 10/7/2021.     Review of Systems   Constitutional: Negative for malaise/fatigue.   Respiratory: Negative for cough, hemoptysis, sputum production, shortness of breath and wheezing.    Cardiovascular: Positive for palpitations and leg swelling. Negative for chest pain,  "orthopnea, claudication and PND.   Gastrointestinal: Negative for nausea and vomiting.   Neurological: Negative for dizziness and weakness.   Psychiatric/Behavioral: The patient is not nervous/anxious.               Objective     /72 (BP Location: Left arm, Patient Position: Sitting, BP Cuff Size: Large adult)   Pulse (!) 54   Resp 12   Ht 1.626 m (5' 4\")   Wt 122 kg (268 lb 14.4 oz)   LMP 03/30/2014   SpO2 95%   BMI 46.16 kg/m²     Physical Exam  Constitutional:       General: She is not in acute distress.     Appearance: She is well-developed. She is not diaphoretic.   HENT:      Head: Normocephalic and atraumatic.   Eyes:      Pupils: Pupils are equal, round, and reactive to light.   Neck:      Vascular: No JVD.   Cardiovascular:      Rate and Rhythm: Normal rate and regular rhythm.      Heart sounds: Normal heart sounds.   Pulmonary:      Effort: Pulmonary effort is normal.      Breath sounds: Normal breath sounds.   Abdominal:      General: Bowel sounds are normal. There is no distension.      Palpations: Abdomen is soft.   Musculoskeletal:      Right lower leg: Edema present.      Left lower leg: Edema present.      Comments: Trace LE edema    Skin:     General: Skin is warm and dry.   Neurological:      Mental Status: She is alert and oriented to person, place, and time.   Psychiatric:         Behavior: Behavior normal.         Thought Content: Thought content normal.         Judgment: Judgment normal.            ECHO  5/1/2020  Left ventricular ejection fraction is visually estimated to be 55%.  Normal diastolic function.  No significant valve abnormalities.   Normal inferior vena cava size and inspiratory collapse.    OPO  8/25/2021  The study was done on CPAP 5 to 15 cm with O2 bleed in at 2   L/min.. The total analyzed time was 7 hrs 28 min. O2 Sat. ramon   was 80% and mean O2 sat was 89% and baseline O2 at 92%. O2 sat   was below 88% for 13.9 min of the flow evaluation time. Oxygen "   Desaturation (>=3%) Index was elevated at 10/hr.       Recommendation:   Consider increasing base EPAP pressure due to elevated VALARIE and   low O2 ramon. Clinical correlation recommended.   Assessment & Plan     1. Essential hypertension  atenolol (TENORMIN) 50 MG Tab   2. Palpitations  atenolol (TENORMIN) 50 MG Tab   3. ILYA (obstructive sleep apnea)     4. Localized edema         Medical Decision Making: Today's Assessment/Status/Plan:          1. Hypertension:  - stable   - continue atenolol     2. Palpitations:  - continue atenolol     3. LE edema:  - recommend compression socks and elevated LE   - low sodium diet     4. ILYA:  - followed by pulmonary medicine   - recent OPO showed increase in base EPAP pressure.     The 10-year ASCVD risk score ([place] YANDY Jr., et al., 2013) is: 2.1%      Follow up in 6 months, sooner as needed.

## 2021-10-07 ENCOUNTER — OFFICE VISIT (OUTPATIENT)
Dept: CARDIOLOGY | Facility: MEDICAL CENTER | Age: 56
End: 2021-10-07
Payer: COMMERCIAL

## 2021-10-07 VITALS
BODY MASS INDEX: 45.91 KG/M2 | HEART RATE: 54 BPM | HEIGHT: 64 IN | RESPIRATION RATE: 12 BRPM | OXYGEN SATURATION: 95 % | WEIGHT: 268.9 LBS | SYSTOLIC BLOOD PRESSURE: 110 MMHG | DIASTOLIC BLOOD PRESSURE: 72 MMHG

## 2021-10-07 DIAGNOSIS — G47.33 OSA (OBSTRUCTIVE SLEEP APNEA): ICD-10-CM

## 2021-10-07 DIAGNOSIS — R60.0 LOCALIZED EDEMA: ICD-10-CM

## 2021-10-07 DIAGNOSIS — R00.2 PALPITATIONS: ICD-10-CM

## 2021-10-07 DIAGNOSIS — I10 ESSENTIAL HYPERTENSION: ICD-10-CM

## 2021-10-07 PROCEDURE — 99214 OFFICE O/P EST MOD 30 MIN: CPT | Performed by: NURSE PRACTITIONER

## 2021-10-07 RX ORDER — ATENOLOL 50 MG/1
TABLET ORAL
Qty: 135 TABLET | Refills: 3 | Status: SHIPPED | OUTPATIENT
Start: 2021-10-07 | End: 2022-10-24 | Stop reason: SDUPTHER

## 2021-10-07 ASSESSMENT — ENCOUNTER SYMPTOMS
WHEEZING: 0
DIZZINESS: 0
HEMOPTYSIS: 0
VOMITING: 0
NERVOUS/ANXIOUS: 0
PALPITATIONS: 1
SPUTUM PRODUCTION: 0
PND: 0
SHORTNESS OF BREATH: 0
COUGH: 0
WEAKNESS: 0
CLAUDICATION: 0
ORTHOPNEA: 0
NAUSEA: 0

## 2021-10-07 ASSESSMENT — FIBROSIS 4 INDEX: FIB4 SCORE: 1.34

## 2021-10-07 NOTE — LETTER
[facility]   [address], [address]  [place] [address]  Phone: [phone]  Fax: [phone]              Luann Morales  1965    Encounter Date: 10/7/2021    EMILY Beatty          PROGRESS NOTE:  Chief Complaint   Patient presents with   • Palpitations   • Hypertension     F/V Dx: Essential hypertension       Subjective     Luann Morales is a 56 y.o. female who presents today for follow up.     She is followed Dr Roldan in our clinic, history sleep apnea currently untreated, thyroid cancer with radiation/ surgery, essential hypertension well treated and palpitations.     Last seen 7/2020 by Dr. Roldan. Echo was normal and unchanged ECG. Concerned benign palpitations and stress triggers.     Overall, patient is doing well. She occasionally gets palpitations relieved with atenolol. Denies any chest pain, sob or dizziness. Mild LE edema noted.     Past Medical History:   Diagnosis Date   • Arthritis      knees   • Cancer (HCC) 2000    thyroid ca   • Chickenpox    • Dental disorder     upper/lower partials   • Essential hypertension    • Heart burn    • Hypothyroidism    • Indigestion    • Pain 07-    left wrist, 6/10   • Snoring     no sleep study   • Unspecified disorder of thyroid 1999    thyroid cancer w/ radiation/surgery     Past Surgical History:   Procedure Laterality Date   • WRIST ARTHROSCOPY Left 8/2/2017    Procedure: WRIST ARTHROSCOPY TRIANGULAR FIBROCARTILAGE COMPLEX DEBRIDEMENT;  Surgeon: Davion Grey M.D.;  Location: SURGERY [place] [facility];  Service:    • LIGAMENT REPAIR  8/2/2017    Procedure: LIGAMENT REPAIR ULNOTRIQUETRAL ;  Surgeon: Davion Grey M.D.;  Location: SURGERY [place] [facility];  Service:    • BREAST RECONSTRUCTION  2016    reduction   • HYSTEROSCOPY WITH VIDEO OPERATIVE  4/11/2014    Performed by Pepe Deleon M.D. at SURGERY SAME DAY [facility]   • DILATION AND CURETTAGE   2014    Performed by Pepe Deleon M.D. at SURGERY SAME DAY [facility]S   • TUBAL LIGATION     • EGD W/ENDOSCOPIC ULTRASOUND  2009    Performed by YARELI ANDERS at SURGERY [facility]   • ANIBAL BY LAPAROSCOPY     • LUMBAR FUSION POSTERIOR  2001   • THYROIDECTOMY TOTAL     • WRIST EXPLORATION      right; excision bone   • PRIMARY C SECTION  ,          Family History   Problem Relation Age of Onset   • Hypertension Mother    • Heart Attack Father    • Heart Disease Father    • Alzheimer's Disease Father    • Suicide Attempts Brother    • Diabetes Maternal Grandmother    • Colon Cancer Maternal Grandfather    • Sleep Apnea Neg Hx      Social History     Socioeconomic History   • Marital status:      Spouse name: Not on file   • Number of children: Not on file   • Years of education: Not on file   • Highest education level: Not on file   Occupational History   • Not on file   Tobacco Use   • Smoking status: Former Smoker     Packs/day: 0.50     Years: 5.00     Pack years: 2.50     Types: Cigarettes     Quit date: 1996     Years since quittin.7   • Smokeless tobacco: Never Used   • Tobacco comment:    Vaping Use   • Vaping Use: Never used   Substance and Sexual Activity   • Alcohol use: Yes     Alcohol/week: 1.8 oz     Types: 1 Glasses of wine, 1 Shots of liquor, 1 Standard drinks or equivalent per week     Comment: occ    • Drug use: No   • Sexual activity: Not on file   Other Topics Concern   • Not on file   Social History Narrative   • Not on file     Social Determinants of Health     Financial Resource Strain:    • Difficulty of Paying Living Expenses:    Food Insecurity:    • Worried About Running Out of Food in the Last Year:    • Ran Out of Food in the Last Year:    Transportation Needs:    • Lack of Transportation (Medical):    • Lack of Transportation (Non-Medical):    Physical Activity:    • Days of Exercise per Week:    • Minutes of  Exercise per Session:    Stress:    • Feeling of Stress :    Social Connections:    • Frequency of Communication with Friends and Family:    • Frequency of Social Gatherings with Friends and Family:    • Attends Tenriism Services:    • Active Member of Clubs or Organizations:    • Attends Club or Organization Meetings:    • Marital Status:    Intimate Partner Violence:    • Fear of Current or Ex-Partner:    • Emotionally Abused:    • Physically Abused:    • Sexually Abused:      Allergies   Allergen Reactions   • Bactrim [Sulfamethoxazole W-Trimethoprim] Itching   • Diclofenac Hives and Itching   • Doxycycline Hives   • Hydrocodone Itching and Vomiting   • Sulfamethizole Itching   • Tetracycline Itching   • Tramadol Itching   • Zpack [Azithromycin] Itching     Outpatient Encounter Medications as of 10/7/2021   Medication Sig Dispense Refill   • atenolol (TENORMIN) 50 MG Tab TAKE 1 TABLET BY MOUTH IN THE MORNING AND 1/2 AT NIGHT 135 Tablet 3   • meloxicam (MOBIC) 15 MG tablet Take 1 tablet by mouth every day. 30 tablet 1   • hyoscyamine (LEVSIN) 0.125 MG SL Tab DISSOLVE 1 TO 2 TABLETS IN MOUTH EVERY 6 HOURS AS NEEDED FOR PAIN     • Cyanocobalamin (VITAMIN B-12 PO) Take  by mouth.     • aspirin EC (ECOTRIN) 325 MG Tablet Delayed Response Take 325 mg by mouth every day.     • lisinopril (PRINIVIL) 20 MG Tab Take 20 mg by mouth.     • FLUoxetine (PROZAC) 10 MG Cap Take 10 mg by mouth.     • levothyroxine (SYNTHROID) 125 MCG Tab Take 1 tablet Monday through Saturday, then 2 tablets on Sunday (Patient taking differently: Take 1 tablet Monday through Sunday) 90 Tab 3   • Glucosamine-Chondroitin (MOVE FREE PO) Take 2 Tabs by mouth every morning.     • omeprazole (PRILOSEC) 20 MG CPDR Take 40 mg by mouth every morning.     • [DISCONTINUED] meloxicam (MOBIC) 15 MG tablet Take 1 Tablet by mouth every day. (Patient not taking: Reported on 10/7/2021) 30 Tablet 1   • [DISCONTINUED] atenolol (TENORMIN) 50 MG Tab TAKE 1 TABLET BY  MOUTH IN THE MORNING AND 1/2 (ONE-HALF) IN THE EVENING (Patient taking differently: TAKE 1 TABLET BY MOUTH IN THE MORNING AND 1/2 AT NIGHT) 135 Tablet 0   • [DISCONTINUED] EUTHYROX 50 MCG Tab Take  by mouth.     • [DISCONTINUED] levothyroxine (SYNTHROID) 50 MCG Tab Take 50 mcg by mouth Every morning on an empty stomach.     • [DISCONTINUED] Ibuprofen-diphenhydrAMINE Cit (ADVIL PM PO) Take  by mouth. (Patient not taking: Reported on 10/7/2021)     • [DISCONTINUED] CALCIUM PO Take  by mouth. (Patient not taking: Reported on 7/6/2021)     • [DISCONTINUED] levothyroxine (SYNTHROID) 137 MCG Tab Take 137 mcg by mouth.     • [DISCONTINUED] ipratropium (ATROVENT) 0.06 % Solution USE 2 SPRAY(S) IN EACH NOSTRIL EVERY 12 HOURS AS NEEDED     • [DISCONTINUED] lisinopril (PRINIVIL) 10 MG Tab Take 1 Tab by mouth every day. (Patient not taking: Reported on 10/7/2021) 30 Tab 2   • [DISCONTINUED] gabapentin (NEURONTIN) 100 MG Cap Take 1 Cap by mouth 3 times a day. (Patient not taking: Reported on 7/24/2020) 25 Cap 0   • [DISCONTINUED] cyclobenzaprine (FLEXERIL) 5 MG tablet Take 1 Tab by mouth 3 times a day as needed for Moderate Pain. (Patient not taking: Reported on 7/24/2020) 15 Tab 0   • [DISCONTINUED] Ibuprofen 200 MG Cap Take 1 Cap by mouth 3 times a day. (Patient not taking: Reported on 7/24/2020) 20 Cap 0   • [DISCONTINUED] fexofenadine (ALLEGRA) 60 MG Tab Take 1 Tab by mouth every day. (Patient not taking: Reported on 7/24/2020) 15 Tab 0   • [DISCONTINUED] fluticasone (FLONASE) 50 MCG/ACT nasal spray Spray 2 Sprays in nose every day. (Patient not taking: Reported on 10/20/2020) 16 g 1   • [DISCONTINUED] therapeutic multivitamin-minerals (THERAGRAN-M) Tab Take 1 Tab by mouth every day. (Patient not taking: Reported on 7/6/2021)     • [DISCONTINUED] Probiotic Product (PROBIOTIC DAILY PO) Take  by mouth. (Patient not taking: Reported on 7/6/2021)       No facility-administered encounter medications on file as of 10/7/2021.  "    Review of Systems   Constitutional: Negative for malaise/fatigue.   Respiratory: Negative for cough, hemoptysis, sputum production, shortness of breath and wheezing.    Cardiovascular: Positive for palpitations and leg swelling. Negative for chest pain, orthopnea, claudication and PND.   Gastrointestinal: Negative for nausea and vomiting.   Neurological: Negative for dizziness and weakness.   Psychiatric/Behavioral: The patient is not nervous/anxious.               Objective     /72 (BP Location: Left arm, Patient Position: Sitting, BP Cuff Size: Large adult)   Pulse (!) 54   Resp 12   Ht 1.626 m (5' 4\")   Wt 122 kg (268 lb 14.4 oz)   LMP 03/30/2014   SpO2 95%   BMI 46.16 kg/m²     Physical Exam  Constitutional:       General: She is not in acute distress.     Appearance: She is well-developed. She is not diaphoretic.   HENT:      Head: Normocephalic and atraumatic.   Eyes:      Pupils: Pupils are equal, round, and reactive to light.   Neck:      Vascular: No JVD.   Cardiovascular:      Rate and Rhythm: Normal rate and regular rhythm.      Heart sounds: Normal heart sounds.   Pulmonary:      Effort: Pulmonary effort is normal.      Breath sounds: Normal breath sounds.   Abdominal:      General: Bowel sounds are normal. There is no distension.      Palpations: Abdomen is soft.   Musculoskeletal:      Right lower leg: Edema present.      Left lower leg: Edema present.      Comments: Trace LE edema    Skin:     General: Skin is warm and dry.   Neurological:      Mental Status: She is alert and oriented to person, place, and time.   Psychiatric:         Behavior: Behavior normal.         Thought Content: Thought content normal.         Judgment: Judgment normal.            ECHO  5/1/2020  Left ventricular ejection fraction is visually estimated to be 55%.  Normal diastolic function.  No significant valve abnormalities.   Normal inferior vena cava size and inspiratory collapse.    OPO  8/25/2021  The " study was done on CPAP 5 to 15 cm with O2 bleed in at 2   L/min.. The total analyzed time was 7 hrs 28 min. O2 Sat. ramon   was 80% and mean O2 sat was 89% and baseline O2 at 92%. O2 sat   was below 88% for 13.9 min of the flow evaluation time. Oxygen   Desaturation (>=3%) Index was elevated at 10/hr.       Recommendation:   Consider increasing base EPAP pressure due to elevated VALARIE and   low O2 ramon. Clinical correlation recommended.   Assessment & Plan     1. Essential hypertension  atenolol (TENORMIN) 50 MG Tab   2. Palpitations  atenolol (TENORMIN) 50 MG Tab   3. ILYA (obstructive sleep apnea)     4. Localized edema         Medical Decision Making: Today's Assessment/Status/Plan:          1. Hypertension:  - stable   - continue atenolol     2. Palpitations:  - continue atenolol     3. LE edema:  - recommend compression socks and elevated LE   - low sodium diet     4. ILYA:  - followed by pulmonary medicine   - recent OPO showed increase in base EPAP pressure.     The 10-year ASCVD risk score ([place] YANDY Jr., et al., 2013) is: 2.1%      Follow up in 6 months, sooner as needed.                   Meng Freeman M.D.  [address] [facility] [facility] [address]  [place] [address]  Via Fax: [phone]

## 2021-10-15 ENCOUNTER — PATIENT MESSAGE (OUTPATIENT)
Dept: SLEEP MEDICINE | Facility: MEDICAL CENTER | Age: 56
End: 2021-10-15

## 2021-10-15 DIAGNOSIS — G47.33 OSA (OBSTRUCTIVE SLEEP APNEA): ICD-10-CM

## 2021-10-15 NOTE — TELEPHONE ENCOUNTER
From: Luann Morales  To: Physician Gelacio Porras  Sent: 10/15/2021 10:37 AM PDT  Subject: The new mask    Hi,    Just wanted to advise you that [facility] has never gotten the request for the new mask that you wanted me to try out. Can you advise them so we can move forward with the new mask.    Thank You   Luann Morales

## 2021-12-27 ENCOUNTER — PRE-ADMISSION TESTING (OUTPATIENT)
Dept: ADMISSIONS | Facility: MEDICAL CENTER | Age: 56
End: 2021-12-27
Attending: INTERNAL MEDICINE
Payer: COMMERCIAL

## 2021-12-27 DIAGNOSIS — Z01.810 PRE-OPERATIVE CARDIOVASCULAR EXAMINATION: ICD-10-CM

## 2021-12-27 DIAGNOSIS — Z01.812 PRE-OPERATIVE LABORATORY EXAMINATION: ICD-10-CM

## 2021-12-27 LAB
ALBUMIN SERPL BCP-MCNC: 3.9 G/DL (ref 3.2–4.9)
ALBUMIN/GLOB SERPL: 1.3 G/DL
ALP SERPL-CCNC: 134 U/L (ref 30–99)
ALT SERPL-CCNC: 27 U/L (ref 2–50)
ANION GAP SERPL CALC-SCNC: 9 MMOL/L (ref 7–16)
AST SERPL-CCNC: 19 U/L (ref 12–45)
BILIRUB SERPL-MCNC: 0.2 MG/DL (ref 0.1–1.5)
BUN SERPL-MCNC: 16 MG/DL (ref 8–22)
CALCIUM SERPL-MCNC: 8.8 MG/DL (ref 8.4–10.2)
CHLORIDE SERPL-SCNC: 106 MMOL/L (ref 96–112)
CO2 SERPL-SCNC: 25 MMOL/L (ref 20–33)
CREAT SERPL-MCNC: 0.89 MG/DL (ref 0.5–1.4)
ERYTHROCYTE [DISTWIDTH] IN BLOOD BY AUTOMATED COUNT: 48.4 FL (ref 35.9–50)
GLOBULIN SER CALC-MCNC: 3 G/DL (ref 1.9–3.5)
GLUCOSE SERPL-MCNC: 86 MG/DL (ref 65–99)
HCT VFR BLD AUTO: 40.5 % (ref 37–47)
HGB BLD-MCNC: 12.5 G/DL (ref 12–16)
MCH RBC QN AUTO: 29 PG (ref 27–33)
MCHC RBC AUTO-ENTMCNC: 30.9 G/DL (ref 33.6–35)
MCV RBC AUTO: 94 FL (ref 81.4–97.8)
PLATELET # BLD AUTO: 210 K/UL (ref 164–446)
PMV BLD AUTO: 9.9 FL (ref 9–12.9)
POTASSIUM SERPL-SCNC: 5 MMOL/L (ref 3.6–5.5)
PROT SERPL-MCNC: 6.9 G/DL (ref 6–8.2)
RBC # BLD AUTO: 4.31 M/UL (ref 4.2–5.4)
SODIUM SERPL-SCNC: 140 MMOL/L (ref 135–145)
WBC # BLD AUTO: 5.6 K/UL (ref 4.8–10.8)

## 2021-12-27 PROCEDURE — 80053 COMPREHEN METABOLIC PANEL: CPT

## 2021-12-27 PROCEDURE — 85027 COMPLETE CBC AUTOMATED: CPT

## 2021-12-27 PROCEDURE — 36415 COLL VENOUS BLD VENIPUNCTURE: CPT

## 2021-12-27 PROCEDURE — 93005 ELECTROCARDIOGRAM TRACING: CPT

## 2021-12-27 RX ORDER — SENNOSIDES 8.6 MG
650 CAPSULE ORAL DAILY
COMMUNITY

## 2021-12-27 RX ORDER — MELATONIN 10 MG
1 CAPSULE ORAL
COMMUNITY
End: 2022-11-29

## 2021-12-27 RX ORDER — MULTIVITAMIN WITH IRON
250 TABLET ORAL
COMMUNITY
End: 2022-03-22

## 2021-12-27 ASSESSMENT — FIBROSIS 4 INDEX: FIB4 SCORE: 1.34

## 2021-12-27 NOTE — OR NURSING
Dr Pineda reviewed patients medical history. Based upon information available, Dr Pineda indicates:     With a BMI greater than 45, ILYA, peripheral edema that is ongoing, and in the setting of the potential need for a general anesthetic for the procedure, it would be best to require a medical clearance from the primary care prior to this procedure. Thank you.    Ad Pineda M.D.  [facility]    Above note faxed to Dr Chaudhary and EVETTE for surgery schedulers of need for clearance.

## 2021-12-28 LAB — EKG IMPRESSION: NORMAL

## 2021-12-28 PROCEDURE — 93010 ELECTROCARDIOGRAM REPORT: CPT | Performed by: INTERNAL MEDICINE

## 2021-12-29 ENCOUNTER — OFFICE VISIT (OUTPATIENT)
Dept: SLEEP MEDICINE | Facility: MEDICAL CENTER | Age: 56
End: 2021-12-29
Payer: COMMERCIAL

## 2021-12-29 VITALS
SYSTOLIC BLOOD PRESSURE: 124 MMHG | OXYGEN SATURATION: 98 % | RESPIRATION RATE: 16 BRPM | HEIGHT: 64 IN | WEIGHT: 270 LBS | HEART RATE: 56 BPM | DIASTOLIC BLOOD PRESSURE: 82 MMHG | BODY MASS INDEX: 46.1 KG/M2

## 2021-12-29 DIAGNOSIS — F51.04 CHRONIC INSOMNIA: ICD-10-CM

## 2021-12-29 DIAGNOSIS — G47.33 OSA (OBSTRUCTIVE SLEEP APNEA): ICD-10-CM

## 2021-12-29 PROCEDURE — 99214 OFFICE O/P EST MOD 30 MIN: CPT | Performed by: FAMILY MEDICINE

## 2021-12-29 RX ORDER — TRAZODONE HYDROCHLORIDE 50 MG/1
50 TABLET ORAL NIGHTLY PRN
Qty: 60 TABLET | Refills: 1 | Status: SHIPPED | OUTPATIENT
Start: 2021-12-29 | End: 2022-01-28

## 2021-12-29 ASSESSMENT — FIBROSIS 4 INDEX: FIB4 SCORE: 0.99

## 2021-12-29 NOTE — PATIENT INSTRUCTIONS
Trazodone tablets  What is this medicine?  TRAZODONE (TRAZ oh done) is used to treat depression.  This medicine may be used for other purposes; ask your health care provider or pharmacist if you have questions.  COMMON BRAND NAME(S): Sabrina  What should I tell my health care provider before I take this medicine?  They need to know if you have any of these conditions:  · attempted suicide or thinking about it  · bipolar disorder  · bleeding problems  · glaucoma  · heart disease, or previous heart attack  · irregular heart beat  · kidney or liver disease  · low levels of sodium in the blood  · an unusual or allergic reaction to trazodone, other medicines, foods, dyes or preservatives  · pregnant or trying to get pregnant  · breast-feeding  How should I use this medicine?  Take this medicine by mouth with a glass of water. Follow the directions on the prescription label. Take this medicine shortly after a meal or a light snack. Take your medicine at regular intervals. Do not take your medicine more often than directed. Do not stop taking this medicine suddenly except upon the advice of your doctor. Stopping this medicine too quickly may cause serious side effects or your condition may worsen.  A special MedGuide will be given to you by the pharmacist with each prescription and refill. Be sure to read this information carefully each time.  Talk to your pediatrician regarding the use of this medicine in children. Special care may be needed.  Overdosage: If you think you have taken too much of this medicine contact a poison control center or emergency room at once.  NOTE: This medicine is only for you. Do not share this medicine with others.  What if I miss a dose?  If you miss a dose, take it as soon as you can. If it is almost time for your next dose, take only that dose. Do not take double or extra doses.  What may interact with this medicine?  Do not take this medicine with any of the following  medications:  · certain medicines for fungal infections like fluconazole, itraconazole, ketoconazole, posaconazole, voriconazole  · cisapride  · dronedarone  · linezolid  · MAOIs like Carbex, Eldepryl, Marplan, Nardil, and Parnate  · mesoridazine  · methylene blue (injected into a vein)  · pimozide  · saquinavir  · thioridazine  This medicine may also interact with the following medications:  · alcohol  · antiviral medicines for HIV or AIDS  · aspirin and aspirin-like medicines  · barbiturates like phenobarbital  · certain medicines for blood pressure, heart disease, irregular heart beat  · certain medicines for depression, anxiety, or psychotic disturbances  · certain medicines for migraine headache like almotriptan, eletriptan, frovatriptan, naratriptan, rizatriptan, sumatriptan, zolmitriptan  · certain medicines for seizures like carbamazepine and phenytoin  · certain medicines for sleep  · certain medicines that treat or prevent blood clots like dalteparin, enoxaparin, warfarin  · digoxin  · fentanyl  · lithium  · NSAIDS, medicines for pain and inflammation, like ibuprofen or naproxen  · other medicines that prolong the QT interval (cause an abnormal heart rhythm) like dofetilide  · rasagiline  · supplements like Verónica's wort, kava kava, valerian  · tramadol  · tryptophan  This list may not describe all possible interactions. Give your health care provider a list of all the medicines, herbs, non-prescription drugs, or dietary supplements you use. Also tell them if you smoke, drink alcohol, or use illegal drugs. Some items may interact with your medicine.  What should I watch for while using this medicine?  Tell your doctor if your symptoms do not get better or if they get worse. Visit your doctor or health care professional for regular checks on your progress. Because it may take several weeks to see the full effects of this medicine, it is important to continue your treatment as prescribed by your  doctor.  Patients and their families should watch out for new or worsening thoughts of suicide or depression. Also watch out for sudden changes in feelings such as feeling anxious, agitated, panicky, irritable, hostile, aggressive, impulsive, severely restless, overly excited and hyperactive, or not being able to sleep. If this happens, especially at the beginning of treatment or after a change in dose, call your health care professional.  You may get drowsy or dizzy. Do not drive, use machinery, or do anything that needs mental alertness until you know how this medicine affects you. Do not stand or sit up quickly, especially if you are an older patient. This reduces the risk of dizzy or fainting spells. Alcohol may interfere with the effect of this medicine. Avoid alcoholic drinks.  This medicine may cause dry eyes and blurred vision. If you wear contact lenses you may feel some discomfort. Lubricating drops may help. See your eye doctor if the problem does not go away or is severe.  Your mouth may get dry. Chewing sugarless gum, sucking hard candy and drinking plenty of water may help. Contact your doctor if the problem does not go away or is severe.  What side effects may I notice from receiving this medicine?  Side effects that you should report to your doctor or health care professional as soon as possible:  · allergic reactions like skin rash, itching or hives, swelling of the face, lips, or tongue  · elevated mood, decreased need for sleep, racing thoughts, impulsive behavior  · confusion  · fast, irregular heartbeat  · feeling faint or lightheaded, falls  · feeling agitated, angry, or irritable  · loss of balance or coordination  · painful or prolonged erections  · restlessness, pacing, inability to keep still  · suicidal thoughts or other mood changes  · tremors  · trouble sleeping  · seizures  · unusual bleeding or bruising  Side effects that usually do not require medical attention (report to your doctor  or health care professional if they continue or are bothersome):  · change in sex drive or performance  · change in appetite or weight  · constipation  · headache  · muscle aches or pains  · nausea  This list may not describe all possible side effects. Call your doctor for medical advice about side effects. You may report side effects to FDA at 3-280-JPX-0185.  Where should I keep my medicine?  Keep out of the reach of children.  Store at room temperature between 15 and 30 degrees C (59 to 86 degrees F). Protect from light. Keep container tightly closed. Throw away any unused medicine after the expiration date.  NOTE: This sheet is a summary. It may not cover all possible information. If you have questions about this medicine, talk to your doctor, pharmacist, or health care provider.  © 2020 [employer]vier/Gold Standard (2019-12-10 11:46:46)

## 2021-12-29 NOTE — PROGRESS NOTES
[facility] Follow Up Note     Date: 12/29/2021 / Time: 10:42 AM    Patient ID:   Name:             Luann Morales   YOB: 1965  Age:                 56 y.o.  female   MRN:               [id number]      Thank you for requesting a sleep medicine consultation on Luann Morales at the sleep center. She presents today with the chief complaints of ILYA follow up. PMH includes hypertension, postoperative hypothyroidism, palpitations, iron deficiency anemia, morbid obesity, cancer, former smoker 0.5 packs/day for 5 years quit in 1/1/96, GERD, indigestion.    HISTORY OF PRESENT ILLNESS:       Pt is currently on ACPAP 7-15 cm with O2 bleed in at 2LPM. She goes to sleep around 9:30am and wakes up around 5-7:30 am. However she has hard time falling asleep until 12 am. She is getting about 6 hrs of sleep on a good night and about 5 hr of sleep on a bad night. Overall, she does not finds her sleep refreshing.She does not take regular naps. She denies any symptoms of RLS, narcolepsy or any symptoms to suggest parasomnias such as nightmares, sleep walking or acting out of dreams.She is using CPAP most days of the week. Pt reports 5 hrs of average nightly use of CPAP. Pt denies snoring, gasping,choking.Pt also denies significant mask leak that is interfering with sleep. The 30 day compliance was downloaded which shows adequate compliance with more that 4 hr usage about 77%. The AHI is has improved to 0.6/hr. The mask leak is normal. The symptoms of ILYA are well controlled on the therapy.     She had an OPO on 8/25/21. The study was done on CPAP 5 to 15 cm with O2 bleed in at 2 L/min.. The total analyzed time was 7 hrs 28 min. O2 Sat. ramon was 80% and mean O2 sat was 89% and baseline O2 at 92%. O2 sat was below 88% for 13.9 min of the flow evaluation time. Oxygen Desaturation (>=3%) Index was elevated at 10/hr.     SLEEP HISTORY   ECHO from 5/1/2020 showed EF of 55% and no significant valve  abnormalities.     HSS from 20 indicated severe sleep apnea - AURA 50.8. Significant nocturnal desaturation - ramon saturation 73% - less than 90% for 36.5% of the TIB. Snorin total snoring episodes/44.3% snoring.       REVIEW OF SYSTEMS:       Constitutional: Denies fevers, Denies weight changes  Eyes: Denies changes in vision, no eye pain  Ears/Nose/Throat/Mouth: Denies nasal congestion or sore throat   Cardiovascular: Denies chest pain or palpitations   Respiratory: Denies shortness of breath , Denies cough  Gastrointestinal/Hepatic: Denies abdominal pain, nausea, vomiting, diarrhea, constipation or GI bleeding   Genitourinary: Deniesdysuria or frequency  Musculoskeletal/Rheum: Denies  joint pain and swelling   Skin/Breast: Denies rash,   Neurological: Denies headache, confusion, memory loss or focal weakness/parasthesias  Psychiatric: denies mood disorder   Sleep: + Insomnia    Comprehensive review of systems form is reviewed with the patient and is attached in the EMR.     PMH:  has a past medical history of Anesthesia, Anxiety disorder, Arthritis, Cancer (formerly [facility]) (), Chickenpox (as child), Chronic nausea, Dental disorder, Edema (10/2021), Elevated alkaline phosphatase level, Essential hypertension, Fatty liver, GERD (gastroesophageal reflux disease), Heart burn, Hypothyroidism, Indigestion, Pain (2017), Palpitations, Rheumatic fever (as child), Seasonal allergies, Sleep apnea, Snoring, and Unspecified disorder of thyroid (). She also has no past medical history of Malay measles.  MEDS:   Current Outpatient Medications:   •  sertraline (ZOLOFT) 50 MG Tab, Take 50 mg by mouth every day., Disp: , Rfl:   •  Melatonin 10 MG Cap, Take 1 Capsule by mouth at bedtime., Disp: , Rfl:   •  acetaminophen (TYLENOL 8 HOUR) 650 MG CR tablet, Take 650 mg by mouth every day., Disp: , Rfl:   •  atenolol (TENORMIN) 50 MG Tab, TAKE 1 TABLET BY MOUTH IN THE MORNING AND 1/2 AT NIGHT, Disp: 135 Tablet, Rfl: 3  •   aspirin EC (ECOTRIN) 325 MG Tablet Delayed Response, Take 325 mg by mouth every day., Disp: , Rfl:   •  lisinopril (PRINIVIL) 20 MG Tab, Take 20 mg by mouth., Disp: , Rfl:   •  levothyroxine (SYNTHROID) 125 MCG Tab, Take 1 tablet Monday through Saturday, then 2 tablets on Sunday (Patient taking differently: Take 125 mcg by mouth every morning on an empty stomach. Take 1 tablet Monday through Sunday), Disp: 90 Tab, Rfl: 3  •  omeprazole (PRILOSEC) 20 MG CPDR, Take 40 mg by mouth every morning., Disp: , Rfl:   •  Non Formulary Request, Take 1 Capsule by mouth every day. Puralean-weight loss OTC, Disp: , Rfl:   •  Magnesium 250 MG Tab, Take 250 mg by mouth at bedtime. For restless legs (Patient not taking: Reported on 12/29/2021), Disp: , Rfl:   •  Cyanocobalamin (VITAMIN B-12 PO), Take 1 Tablet by mouth every day. (Patient not taking: Reported on 12/29/2021), Disp: , Rfl:   •  Glucosamine-Chondroitin (MOVE FREE PO), Take 1 Tablet by mouth every morning. (Patient not taking: Reported on 12/29/2021), Disp: , Rfl:   ALLERGIES:   Allergies   Allergen Reactions   • Bactrim [Sulfamethoxazole W-Trimethoprim] Itching   • Diclofenac Hives and Itching   • Doxycycline Hives   • Hydrocodone Itching and Vomiting   • Sulfamethizole Itching   • Tetracycline Itching   • Tramadol Itching   • Zpack [Azithromycin] Itching     SURGHX:   Past Surgical History:   Procedure Laterality Date   • COLONOSCOPY  03/27/2018    with EGD   • SHOULDER ARTHROSCOPY Left 2018   • WRIST ARTHROSCOPY Left 8/2/2017    Procedure: WRIST ARTHROSCOPY TRIANGULAR FIBROCARTILAGE COMPLEX DEBRIDEMENT;  Surgeon: Davion Grey M.D.;  Location: SURGERY [facility];  Service:    • LIGAMENT REPAIR  8/2/2017    Procedure: LIGAMENT REPAIR ULNOTRIQUETRAL ;  Surgeon: Davion Grey M.D.;  Location: SURGERY [facility];  Service:    • BREAST RECONSTRUCTION  2016    reduction   • HYSTEROSCOPY WITH VIDEO OPERATIVE  4/11/2014    Performed by Pepe SORENSEN  "DAVID Deleon at SURGERY SAME DAY [facility] ORS   • DILATION AND CURETTAGE  2014    Performed by Pepe Deleon M.D. at SURGERY SAME DAY [facility]   • TUBAL LIGATION     • EGD W/ENDOSCOPIC ULTRASOUND  2009    Performed by YARELI ANDERS at SURGERY [facility]   • ANIBAL BY LAPAROSCOPY     • FUSION, SPINE, LUMBAR, PLIF  2001   • THYROIDECTOMY TOTAL     • WRIST EXPLORATION      right; excision bone   • PRIMARY C SECTION  ,        • TUBAL COAGULATION LAPAROSCOPIC BILATERAL Bilateral      SOCHX:  reports that she quit smoking about 26 years ago. Her smoking use included cigarettes. She has a 2.50 pack-year smoking history. She has never used smokeless tobacco. She reports current alcohol use. She reports current drug use. Drug: Oral..  FH:   Family History   Problem Relation Age of Onset   • Hypertension Mother    • Heart Attack Father    • Heart Disease Father    • Alzheimer's Disease Father    • Suicide Attempts Brother    • Diabetes Maternal Grandmother    • Colon Cancer Maternal Grandfather    • Sleep Apnea Neg Hx          Physical Exam:  Vitals/ General Appearance:   Weight/BMI: Body mass index is 46.35 kg/m².  Resp 16   Ht 1.626 m (5' 4\")   Wt 122 kg (270 lb)   Vitals:    21 1040   Resp: 16   Weight: 122 kg (270 lb)   Height: 1.626 m (5' 4\")       Pt. is alert and oriented to time, place and person. Cooperative and in no apparent distress.       Constitutional: Alert, no distress, well-groomed.  Skin: No rashes in visible areas.  Eye: Round. Conjunctiva clear, lids normal. No icterus.   ENMT: Lips pink without lesions, good dentition, moist mucous membranes. Phonation normal.  Neck: No masses, no thyromegaly. Moves freely without pain.  CV: Pulse as reported by patient  Respiratory: Unlabored respiratory effort, no cough or audible wheeze  Psych: Alert and oriented x3, normal affect and mood.     ASSESSMENT AND PLAN     1. Sleep Apnea    The " pathophysiology of sleep anea and the increased risk of cardiovascular morbidity from untreated sleep apnea is discussed in detail with the patient. She is urged to avoid supine sleep, weight gain and alcoholic beverages since all of these can worsen sleep apnea. She is cautioned against drowsy driving. If She feels sleepy while driving, She must pull over for a break/nap, rather than persist on the road, in the interest of She own safety and that of others on the road.   Plan   - Continue ACPAP 7-15 cm with simplus mask    - OPO is ordered today    - compliance download was reviewed and discussed with the pt   - compliance was reinforced     2. Chronic Insomnia: The importance of cognitive restructuring and behavior modification therapy is emphasized for long-term improvement rather than the use of hypnotic agents, which may offer short term relief but may lead to the development of tolerance and side effects with prolonged use. Evening exercise, wind-down before bedtime, and stimulus control (leaving the bed when unable to fall back asleep at night) are explained.      Plan   -Recommended against going to bed when she is not sleepy.  Handouts on stimulus control and sleep hygiene are given and a couple of titles of books on insomnia are recommended, written by behavioral psychologists.    -Starting on trazodone 50 mg 1 to 2 tablets nightly as needed for short period of time while she works on sleep  hygiene. Recommended drug holidays    Regarding treatment of other past medical problems and general health maintenance,  She is urged to follow up with PCP.

## 2022-01-03 ENCOUNTER — HOSPITAL ENCOUNTER (OUTPATIENT)
Dept: LAB | Facility: MEDICAL CENTER | Age: 57
End: 2022-01-03
Attending: INTERNAL MEDICINE
Payer: COMMERCIAL

## 2022-01-03 ENCOUNTER — HOSPITAL ENCOUNTER (OUTPATIENT)
Dept: LAB | Facility: MEDICAL CENTER | Age: 57
End: 2022-01-03
Attending: FAMILY MEDICINE
Payer: COMMERCIAL

## 2022-01-03 LAB
APPEARANCE UR: CLEAR
BACTERIA #/AREA URNS HPF: NEGATIVE /HPF
BILIRUB UR QL STRIP.AUTO: NEGATIVE
COLOR UR: YELLOW
EPI CELLS #/AREA URNS HPF: ABNORMAL /HPF
GLUCOSE UR STRIP.AUTO-MCNC: NEGATIVE MG/DL
HYALINE CASTS #/AREA URNS LPF: ABNORMAL /LPF
IRON SATN MFR SERPL: 18 % (ref 15–55)
IRON SERPL-MCNC: 52 UG/DL (ref 40–170)
KETONES UR STRIP.AUTO-MCNC: NEGATIVE MG/DL
LEUKOCYTE ESTERASE UR QL STRIP.AUTO: ABNORMAL
MICRO URNS: ABNORMAL
NITRITE UR QL STRIP.AUTO: NEGATIVE
PH UR STRIP.AUTO: 6 [PH] (ref 5–8)
PROT UR QL STRIP: NEGATIVE MG/DL
RBC # URNS HPF: ABNORMAL /HPF
RBC UR QL AUTO: NEGATIVE
SP GR UR STRIP.AUTO: 1.01
TIBC SERPL-MCNC: 296 UG/DL (ref 250–450)
UIBC SERPL-MCNC: 244 UG/DL (ref 110–370)
UROBILINOGEN UR STRIP.AUTO-MCNC: 0.2 MG/DL
WBC #/AREA URNS HPF: ABNORMAL /HPF

## 2022-01-03 PROCEDURE — 36415 COLL VENOUS BLD VENIPUNCTURE: CPT

## 2022-01-03 PROCEDURE — 83550 IRON BINDING TEST: CPT

## 2022-01-03 PROCEDURE — 83540 ASSAY OF IRON: CPT

## 2022-01-03 PROCEDURE — 81001 URINALYSIS AUTO W/SCOPE: CPT

## 2022-01-10 ENCOUNTER — HOSPITAL ENCOUNTER (OUTPATIENT)
Facility: MEDICAL CENTER | Age: 57
End: 2022-01-10
Attending: INTERNAL MEDICINE | Admitting: INTERNAL MEDICINE
Payer: COMMERCIAL

## 2022-01-10 ENCOUNTER — ANESTHESIA (OUTPATIENT)
Dept: SURGERY | Facility: MEDICAL CENTER | Age: 57
End: 2022-01-10
Payer: COMMERCIAL

## 2022-01-10 ENCOUNTER — ANESTHESIA EVENT (OUTPATIENT)
Dept: SURGERY | Facility: MEDICAL CENTER | Age: 57
End: 2022-01-10
Payer: COMMERCIAL

## 2022-01-10 VITALS
TEMPERATURE: 96.8 F | OXYGEN SATURATION: 97 % | WEIGHT: 274.03 LBS | BODY MASS INDEX: 46.78 KG/M2 | HEART RATE: 51 BPM | SYSTOLIC BLOOD PRESSURE: 114 MMHG | HEIGHT: 64 IN | RESPIRATION RATE: 16 BRPM | DIASTOLIC BLOOD PRESSURE: 61 MMHG

## 2022-01-10 LAB
EXTERNAL QUALITY CONTROL: NORMAL
PATHOLOGY CONSULT NOTE: NORMAL
SARS-COV+SARS-COV-2 AG RESP QL IA.RAPID: NEGATIVE

## 2022-01-10 PROCEDURE — 160009 HCHG ANES TIME/MIN: Performed by: INTERNAL MEDICINE

## 2022-01-10 PROCEDURE — 88312 SPECIAL STAINS GROUP 1: CPT

## 2022-01-10 PROCEDURE — 700111 HCHG RX REV CODE 636 W/ 250 OVERRIDE (IP): Performed by: ANESTHESIOLOGY

## 2022-01-10 PROCEDURE — 160048 HCHG OR STATISTICAL LEVEL 1-5: Performed by: INTERNAL MEDICINE

## 2022-01-10 PROCEDURE — 700101 HCHG RX REV CODE 250: Performed by: INTERNAL MEDICINE

## 2022-01-10 PROCEDURE — 87426 SARSCOV CORONAVIRUS AG IA: CPT | Performed by: INTERNAL MEDICINE

## 2022-01-10 PROCEDURE — 160203 HCHG ENDO MINUTES - 1ST 30 MINS LEVEL 4: Performed by: INTERNAL MEDICINE

## 2022-01-10 PROCEDURE — 88305 TISSUE EXAM BY PATHOLOGIST: CPT | Mod: 59

## 2022-01-10 PROCEDURE — 700105 HCHG RX REV CODE 258: Performed by: INTERNAL MEDICINE

## 2022-01-10 PROCEDURE — 160035 HCHG PACU - 1ST 60 MINS PHASE I: Performed by: INTERNAL MEDICINE

## 2022-01-10 PROCEDURE — 160002 HCHG RECOVERY MINUTES (STAT): Performed by: INTERNAL MEDICINE

## 2022-01-10 PROCEDURE — 160025 RECOVERY II MINUTES (STATS): Performed by: INTERNAL MEDICINE

## 2022-01-10 PROCEDURE — 160046 HCHG PACU - 1ST 60 MINS PHASE II: Performed by: INTERNAL MEDICINE

## 2022-01-10 RX ORDER — DIPHENHYDRAMINE HYDROCHLORIDE 50 MG/ML
12.5 INJECTION INTRAMUSCULAR; INTRAVENOUS
Status: DISCONTINUED | OUTPATIENT
Start: 2022-01-10 | End: 2022-01-10 | Stop reason: HOSPADM

## 2022-01-10 RX ORDER — SODIUM CHLORIDE, SODIUM LACTATE, POTASSIUM CHLORIDE, CALCIUM CHLORIDE 600; 310; 30; 20 MG/100ML; MG/100ML; MG/100ML; MG/100ML
INJECTION, SOLUTION INTRAVENOUS CONTINUOUS
Status: DISCONTINUED | OUTPATIENT
Start: 2022-01-10 | End: 2022-01-10 | Stop reason: HOSPADM

## 2022-01-10 RX ORDER — ONDANSETRON 2 MG/ML
4 INJECTION INTRAMUSCULAR; INTRAVENOUS
Status: DISCONTINUED | OUTPATIENT
Start: 2022-01-10 | End: 2022-01-10 | Stop reason: HOSPADM

## 2022-01-10 RX ORDER — OXYCODONE HCL 5 MG/5 ML
5 SOLUTION, ORAL ORAL
Status: DISCONTINUED | OUTPATIENT
Start: 2022-01-10 | End: 2022-01-10 | Stop reason: HOSPADM

## 2022-01-10 RX ORDER — MEPERIDINE HYDROCHLORIDE 25 MG/ML
6.25 INJECTION INTRAMUSCULAR; INTRAVENOUS; SUBCUTANEOUS
Status: DISCONTINUED | OUTPATIENT
Start: 2022-01-10 | End: 2022-01-10 | Stop reason: HOSPADM

## 2022-01-10 RX ORDER — OXYCODONE HCL 5 MG/5 ML
10 SOLUTION, ORAL ORAL
Status: DISCONTINUED | OUTPATIENT
Start: 2022-01-10 | End: 2022-01-10 | Stop reason: HOSPADM

## 2022-01-10 RX ORDER — HALOPERIDOL 5 MG/ML
1 INJECTION INTRAMUSCULAR
Status: DISCONTINUED | OUTPATIENT
Start: 2022-01-10 | End: 2022-01-10 | Stop reason: HOSPADM

## 2022-01-10 RX ADMIN — SODIUM CHLORIDE, POTASSIUM CHLORIDE, SODIUM LACTATE AND CALCIUM CHLORIDE: 600; 310; 30; 20 INJECTION, SOLUTION INTRAVENOUS at 07:44

## 2022-01-10 RX ADMIN — LIDOCAINE HYDROCHLORIDE 0.1 ML: 10 INJECTION, SOLUTION EPIDURAL; INFILTRATION; INTRACAUDAL; PERINEURAL at 07:43

## 2022-01-10 RX ADMIN — PROPOFOL 380 MG: 10 INJECTION, EMULSION INTRAVENOUS at 09:12

## 2022-01-10 ASSESSMENT — FIBROSIS 4 INDEX: FIB4 SCORE: 0.99

## 2022-01-10 NOTE — ANESTHESIA POSTPROCEDURE EVALUATION
Patient: Luann Morales    Procedure Summary     Date: 01/10/22 Room / Location:  ENDOSCOPIC ULTRASOUND ROOM / SURGERY [facility]    Anesthesia Start: 0836 Anesthesia Stop: 0913    Procedures:       COLONOSCOPY (Anus)      GASTROSCOPY (Esophagus) Diagnosis: (GASTRITIS, ANEMIA)    Surgeons: Francisco Chaudhary M.D. Responsible Provider: Vaughn Mendez M.D.    Anesthesia Type: MAC ASA Status: 3          Final Anesthesia Type: MAC  Last vitals  BP   Blood Pressure: 142/87    Temp   36 °C (96.8 °F)    Pulse   61   Resp   17    SpO2   94 %      Anesthesia Post Evaluation    Patient location during evaluation: PACU  Patient participation: complete - patient participated  Level of consciousness: awake and alert    Airway patency: patent  Anesthetic complications: no  Cardiovascular status: hemodynamically stable  Respiratory status: acceptable  Hydration status: euvolemic    PONV: none          There were no known complications for this encounter.     Nurse Pain Score: 0 (NPRS)

## 2022-01-10 NOTE — OR NURSING
0745  Pt allergies and NPO status verified.  Home medications reconciled.  Belongings secured.  Pt verbalizes understanding of pain scale;expected course of stay and plan of care.  Surgical site verified with pt.  IV access established.  Triple AIM completed All questions answered.  Bed in low position  Call light in reach.

## 2022-01-10 NOTE — OR SURGEON
Immediate Post OP Note    PreOp Diagnosis: Anemia      PostOp Diagnosis: Same      Procedure(s):  COLONOSCOPY - Wound Class: Clean Contaminated  GASTROSCOPY - Wound Class: Clean Contaminated    Surgeon(s):  Francisco Chaudhary M.D.    Anesthesiologist/Type of Anesthesia:  Anesthesiologist: Vaughn Mendez M.D./SHANA    Surgical Staff:  Circulator: Adry Brambila R.N.  Endoscopy Technician: Georgiana Dennison    Specimens removed if any:  ID Type Source Tests Collected by Time Destination   A : Duodenum Biopsy Tissue Duodenum PATHOLOGY SPECIMEN Francisco Chaudhary M.D. 1/10/2022  8:46 AM    B : Gastric Antrum Biopsy Tissue Antrum PATHOLOGY SPECIMEN Francisco Chaudhary M.D. 1/10/2022  8:46 AM        Dr. Chaudhary  [facility]  [phone]    EGD with:  Biopsy    Colonoscopy    Indication:  Anemia of unclear etiology    Sedation:  MAC  (Dr. Mendez)    Findings:   EGD    Esophagus     Unremarkable mucosa     Z-line regular at 38 cm    Stomach     Hiatal hernia at 38 cm to 40 cm     Antrum erythema edema and mosaic mucosal pattern biopsied    Duodenum     Unremarkable mucosa biopsied rule out celiac sprue     Colonoscopy    GLADYS     Unremarkable    Terminal ileum     Unremarkable mucosa    Colon     Fair adequate prep, extensive irrigation required     Unremarkable mucosa throughout without source of anemia identified      Plan:   Follow CBC     Resume preendoscopy diet / medications      1/10/2022 8:59 AM Francisco Chaudhary M.D.

## 2022-01-10 NOTE — OR NURSING
0903: To PACU post colonoscopy/gastroscopy. Pt is extubated, breathing is spontaneous and unlabored. Denies pain or nausea.  0935: No change. Meets criteria for stage ll.

## 2022-01-10 NOTE — ANESTHESIA PREPROCEDURE EVALUATION
Case: [id number] Date/Time: 01/10/22 0815    Procedures:       COLONOSCOPY (Anus)      GASTROSCOPY    Anesthesia type: General    Pre-op diagnosis: DIARRHEA; UNSPECIFIED, NAUSEA AND VOMITING    Location:  ENDOSCOPIC ULTRASOUND ROOM / SURGERY [facility]    Surgeons: Francisco Chaudhary M.D.          Relevant Problems   ANESTHESIA   (positive) ILYA (obstructive sleep apnea)      CARDIAC   (positive) Essential hypertension      ENDO   (positive) Postoperative hypothyroidism       Physical Exam    Airway   Mallampati: II  TM distance: >3 FB  Neck ROM: full       Cardiovascular - normal exam  Rhythm: regular  Rate: normal  (-) murmur     Dental - normal exam           Pulmonary - normal exam  Breath sounds clear to auscultation     Abdominal    Neurological - normal exam                 Anesthesia Plan    ASA 3   ASA physical status 3 criteria: morbid obesity - BMI greater than or equal to 40    Plan - MAC               Induction: intravenous    Postoperative Plan: Postoperative administration of opioids is intended.    Pertinent diagnostic labs and testing reviewed    Informed Consent:    Anesthetic plan and risks discussed with patient.    Use of blood products discussed with: patient whom consented to blood products.

## 2022-01-10 NOTE — PROCEDURES
DATE OF PROCEDURE:  01/10/2022     PROCEDURES:  1.  Esophagogastroduodenoscopy with biopsy.  2.  Colonoscopy.     INDICATIONS:  Anemia of unclear etiology.     FINAL IMPRESSION:  EGD FINDINGS:  1.  Esophagus:  Unremarkable mucosa.  2.  A 2 cm hiatal hernia.  3.  Gastric antrum erythema and edema and mosaic mucosal pattern, biopsied.  4.  Duodenum:  Unremarkable mucosa, biopsied to rule out celiac sprue.     COLONOSCOPY FINDINGS:  1.  Digital rectal exam unremarkable.  2.  Terminal ileum, unremarkable mucosa.  3.  Fair adequate prep throughout, requiring extensive irrigation to visualize   the mucosa.  4.  Unremarkable colonic mucosa throughout without source of anemia   identified.     RECOMMENDATIONS:  1.  Follow up blood count.  2.  Resume pre-endoscopy diet and medications.     DESCRIPTION OF PROCEDURE:  Prior to the procedure, physical exam was stable.    During procedure, vital signs remained within normal limits.  Prior to   sedation, informed consent was obtained.  Risks, benefits, alternatives   including but not limited to risk of bleeding, infection, perforation, adverse   reaction to medication, failure to identify pathology and death explained to   the patient at length.  She accepted all risks.  The patient was prepped in   the left lateral position.  IV sedation was given in the form of propofol by   anesthesia.     EGD:  Scope tip of the Olympus flexible gastroscope passed in the proximal   esophagus.  Proximal middle and distal thirds of the esophagus were well   visualized and were unremarkable.  The Z line was regular at 38 cm.  Stomach   was entered.  There was a hiatal hernia from 38-40 cm.  Gastric pool was   suctioned, air insufflated, scope retroflexed to view the body, fundus, and   cardia, which were unremarkable.  Scope straightened to view the antrum and   incisura, which displayed a diffuse area of erythema and edema and a mosaic   mucosal pattern.  Biopsies were taken for H. pylori.  The  pylorus was patent.    Duodenal bulb sweep second and third portion were unremarkable.  Biopsies   were taken for celiac sprue.  The scope was withdrawn.  Air and liquid were   suctioned.  The patient tolerated the procedure well and we proceeded with   colonoscopy.     COLONOSCOPY:  Digital rectal exam was performed and was unremarkable.  Scope   tip of the Olympus flexible adjustable colonoscope passed from the rectum   through to the cecum without difficulty.  Of note, there was turbid colored   liquid stool and mucus throughout the colon, so extensive irrigation was   performed to visualizing mucosa.  The ileocecal valve area behind the   ileocecal valve and appendiceal orifice were identified and were unremarkable.    Retroflex in the right colon was unremarkable.  Terminal ileum was intubated   for 10 cm and was unremarkable.  Scope was withdrawn back to the cecum and   withdrawn from the cecum through to the rectum visualizing mucosa in a   methodical 360 degree manner.  Irrigation was performed where needed.  No   lesions were seen.  The exam was essentially normal.  Retroflex in the rectum   was unremarkable with no ____.  Procedure was deemed complete.  The scope was   straightened and withdrawn.  Air and liquid were suctioned.  The patient   tolerated the procedure well and was sent to recovery without immediate   complications.        ______________________________  MD KENRICK MCCARTHY/YANELIS/ÓSCAR    DD:  01/10/2022 09:06  DT:  01/10/2022 09:26    Job#:  [id number]

## 2022-01-10 NOTE — ANESTHESIA TIME REPORT
Anesthesia Start and Stop Event Times     Date Time Event    1/10/2022 0824 Ready for Procedure     0836 Anesthesia Start     0913 Anesthesia Stop        Responsible Staff  01/10/22    Name Role Begin End    Vaughn Mendez M.D. Anesth 0836 0913        Preop Diagnosis (Free Text):  Pre-op Diagnosis     DIARRHEA; UNSPECIFIED, NAUSEA AND VOMITING        Preop Diagnosis (Codes):    Premium Reason  Non-Premium    Comments:

## 2022-01-10 NOTE — DISCHARGE INSTRUCTIONS
ENDOSCOPY HOME CARE INSTRUCTIONS    GASTROSCOPY OR ERCP  1. Don't eat or drink anything for about an hour after the test. You can then resume your regular diet.  2. Don't drive or drink alcohol for 24 hours. The medication you received will make you too drowsy.  4. If you begin to vomit bloody material, or develop black or bloody stools, call your doctor as soon as possible.  5. If you have any neck, chest, abdominal pain or temp of 100 degrees, call your doctor.  6. See your doctor ***  7. Additional instructions: ***  8. Prescriptions: ***    COLONOSCOPY OR FLEXIBLE SIGMOIDOSCOPY  1. If you received a barium enema, take a mild laxative such as dulcolax, Ruby's M.O., or Milk of Magnesia to clean out the barium.  2. Drink plenty of fluids. Eat a diet high in fiber (whole-grain breads, fresh fruit and vegetables).  3. You may notice a few drops of blood with your first bowel movement. If you develop any large amount of bleeding, black stools, a fever, or abdominal pain, call your doctor right away.  4. Call your doctor for test results in {SRG NUMBER OF DAYS:7969631} {SRG DAY WEEK MONTH:3725115}.  5. Don't drive or drink alcohol for 24 hours. The medication you received will make you too drowsy.    7. Additional instructions: ***  8. Prescriptions: ***    Dr. Chaudhary  [facility]  [facility]  [phone]     EGD with:                    Biopsy     Colonoscopy     Indication:                    Anemia of unclear etiology     Sedation:                     [facility]  (Dr. Mendez)     Findings:              EGD                          Esophagus                                      Unremarkable mucosa                                      Z-line regular at 38 cm                          Stomach                                      Hiatal hernia at 38 cm to 40 cm                                      Antrum erythema edema and mosaic mucosal pattern biopsied                          Duodenum                                       Unremarkable mucosa biopsied rule out celiac sprue                 Colonoscopy                          GLADYS                                      Unremarkable                          Terminal ileum                                      Unremarkable mucosa                          Colon                                      Fair adequate prep, extensive irrigation required                                      Unremarkable mucosa throughout without source of anemia identified        Plan:                            Follow CBC                                      Resume preendoscopy diet / medications       You should call 911 if you develop problems with breathing or chest pain.  If any questions arise, call your doctor. If your doctor is not available, please feel free to call [phone]. You can also call the HEALTH HOTLINE open 24 hours/day, 7 days/week and speak to a nurse at [phone], or toll free [phone].    Depression / Suicide Risk    As you are discharged from this [facility] facility, it is important to learn how to keep safe from harming yourself.    Recognize the warning signs:  · Abrupt changes in personality, positive or negative- including increase in energy   · Giving away possessions  · Change in eating patterns- significant weight changes-  positive or negative  · Change in sleeping patterns- unable to sleep or sleeping all the time   · Unwillingness or inability to communicate  · Depression  · Unusual sadness, discouragement and loneliness  · Talk of wanting to die  · Neglect of personal appearance   · Rebelliousness- reckless behavior  · Withdrawal from people/activities they love  · Confusion- inability to concentrate     If you or a loved one observes any of these behaviors or has concerns about self-harm, here's what you can do:  · Talk about it- your feelings and reasons for harming yourself  · Remove any means that you might use to hurt yourself (examples: pills, rope,  extension cords, firearm)  · Get professional help from the community (Mental Health, Substance Abuse, psychological counseling)  · Do not be alone:Call your Safe Contact- someone whom you trust who will be there for you.  · Call your local CRISIS HOTLINE 777-9964 or 594-948-1543  · Call your local Children's Mobile Crisis Response Team [place] (023) 332-5302 or www.S[employer]Stage  · Call the toll free National Suicide Prevention Hotlines   · National Suicide Prevention Lifeline 416-841-VPQG (8851)  · National Hope Line Network 800-SUICIDE (538-8552)    I acknowledge receipt and understanding of these Home Care Instructions.

## 2022-02-10 ENCOUNTER — HOME STUDY (OUTPATIENT)
Dept: SLEEP MEDICINE | Facility: MEDICAL CENTER | Age: 57
End: 2022-02-10
Attending: FAMILY MEDICINE
Payer: COMMERCIAL

## 2022-02-10 DIAGNOSIS — G47.33 OSA (OBSTRUCTIVE SLEEP APNEA): ICD-10-CM

## 2022-02-10 PROCEDURE — 94762 N-INVAS EAR/PLS OXIMTRY CONT: CPT | Performed by: FAMILY MEDICINE

## 2022-02-13 NOTE — PROCEDURES
Over Night Pulse Oximetry     Indication:To assess the efficacy of the current pressure .       Impression:   The study was done on CPAP 7-15 cm with O2 bleed in at 2LPM. The total analyzed time was 3 hrs 22 min. O2 Sat. ramon was 56% and mean O2 sat was 88 % and baseline O2 at 991%. O2 sat was below 88% for 4.3 min of the flow evaluation time. Oxygen Desaturation (>=3%) Index was 3.9/hr.      Recommendation:  The O2 ramon seems like an artifact. There was loss of data for 2 hours during the recording. Otherwide unremarkable OPO. Clinical correlation recommended.

## 2022-02-22 ENCOUNTER — APPOINTMENT (OUTPATIENT)
Dept: SLEEP MEDICINE | Facility: MEDICAL CENTER | Age: 57
End: 2022-02-22
Payer: COMMERCIAL

## 2022-03-02 ENCOUNTER — TELEPHONE (OUTPATIENT)
Dept: SCHEDULING | Facility: IMAGING CENTER | Age: 57
End: 2022-03-02
Payer: COMMERCIAL

## 2022-03-22 ENCOUNTER — OFFICE VISIT (OUTPATIENT)
Dept: MEDICAL GROUP | Facility: PHYSICIAN GROUP | Age: 57
End: 2022-03-22
Payer: COMMERCIAL

## 2022-03-22 VITALS
SYSTOLIC BLOOD PRESSURE: 130 MMHG | RESPIRATION RATE: 16 BRPM | OXYGEN SATURATION: 97 % | HEIGHT: 64 IN | WEIGHT: 274 LBS | TEMPERATURE: 97.4 F | DIASTOLIC BLOOD PRESSURE: 82 MMHG | BODY MASS INDEX: 46.78 KG/M2 | HEART RATE: 63 BPM

## 2022-03-22 DIAGNOSIS — Z12.31 ENCOUNTER FOR SCREENING MAMMOGRAM FOR BREAST CANCER: ICD-10-CM

## 2022-03-22 DIAGNOSIS — R05.9 COUGH: ICD-10-CM

## 2022-03-22 DIAGNOSIS — E66.01 MORBID OBESITY WITH BMI OF 40.0-44.9, ADULT (HCC): ICD-10-CM

## 2022-03-22 DIAGNOSIS — I10 ESSENTIAL HYPERTENSION: Chronic | ICD-10-CM

## 2022-03-22 DIAGNOSIS — E89.0 POSTOPERATIVE HYPOTHYROIDISM: ICD-10-CM

## 2022-03-22 DIAGNOSIS — F41.9 ANXIETY: ICD-10-CM

## 2022-03-22 DIAGNOSIS — N89.8 VAGINAL DRYNESS: ICD-10-CM

## 2022-03-22 PROCEDURE — 99214 OFFICE O/P EST MOD 30 MIN: CPT | Performed by: PHYSICIAN ASSISTANT

## 2022-03-22 RX ORDER — BENZONATATE 100 MG/1
100 CAPSULE ORAL 3 TIMES DAILY PRN
Qty: 10 CAPSULE | Refills: 0 | Status: SHIPPED | OUTPATIENT
Start: 2022-03-22 | End: 2022-05-02

## 2022-03-22 RX ORDER — AMOXICILLIN 875 MG/1
875 TABLET, COATED ORAL 2 TIMES DAILY
COMMUNITY
Start: 2022-03-04 | End: 2022-03-22

## 2022-03-22 RX ORDER — CIPROFLOXACIN 500 MG/1
TABLET, FILM COATED ORAL
COMMUNITY
Start: 2022-01-28 | End: 2022-05-02

## 2022-03-22 RX ORDER — CONJUGATED ESTROGENS 0.62 MG/G
0.5 CREAM VAGINAL DAILY
Qty: 30 G | Refills: 0 | Status: SHIPPED | OUTPATIENT
Start: 2022-03-22 | End: 2022-06-02

## 2022-03-22 RX ORDER — PREDNISONE 20 MG/1
20 TABLET ORAL 2 TIMES DAILY
COMMUNITY
Start: 2022-03-04 | End: 2022-03-22

## 2022-03-22 ASSESSMENT — FIBROSIS 4 INDEX: FIB4 SCORE: 0.99

## 2022-03-22 ASSESSMENT — PATIENT HEALTH QUESTIONNAIRE - PHQ9: CLINICAL INTERPRETATION OF PHQ2 SCORE: 0

## 2022-03-22 NOTE — ASSESSMENT & PLAN NOTE
Patient reports that she was diagnosed with bronchitis 3 weeks ago and is still having a lingering cough. Tested negative for COVID-19. Starting to get coughing attacks more often. Has not tried any OTC meds. Had a fever at the beginning but not recently.

## 2022-03-22 NOTE — PROGRESS NOTES
Subjective:     CC: establish care, cough, vaginal dryness    HPI:   Luann presents today with     Cough  Patient reports that she was diagnosed with bronchitis 3 weeks ago and is still having a lingering cough. Tested negative for COVID-19. Starting to get coughing attacks more often. Has not tried any OTC meds. Had a fever at the beginning but not recently.  Denies shortness of breath, chest pain.     Essential hypertension  BP at home is usually 120s/80s.  Denies side effects from medications.    Anxiety  Sertraline has beem working well.  She reports that she mostly has anxiety because she has been fighting with her  a lot.  Does not think counseling would be helpful.     Vaginal dryness  Patient reports that sexual intercourse with her  has become more painful.  She states it is normally painful for the entirety of intercourse, even if they are using lubrication.  She does report that directly after sex sometimes when she wipes there is a very minimal amount of brown spotting that goes away immediately.  Denies vaginal bleeding      Past Medical History:   Diagnosis Date   • Anesthesia     slow to wake up   • Anxiety disorder    • Arthritis      knees   • Cancer (HCC) 2000    thyroid ca-thyroidectomy. Radiation tx. No chemo.   • Chickenpox as child   • Chronic nausea    • Dental disorder     upper/lower dentures   • Edema 10/2021    to ankles bilateral. 12/27/21-continues, followed by PCP.   • Elevated alkaline phosphatase level    • Essential hypertension    • Fatty liver    • GERD (gastroesophageal reflux disease)    • Heart burn     taking omeprazole   • Hypothyroidism    • Indigestion     taking omeprazole   • Pain 07-    left wrist, 6/10   • Palpitations    • Rheumatic fever as child   • Seasonal allergies    • Sleep apnea     CPAP with Oxygen 2L @ night   • Snoring     no sleep study   • Unspecified disorder of thyroid 1999    thyroid cancer w/ radiation/surgery       Social History      Tobacco Use   • Smoking status: Former Smoker     Packs/day: 0.50     Years: 5.00     Pack years: 2.50     Types: Cigarettes     Quit date: 1996     Years since quittin.2   • Smokeless tobacco: Never Used   • Tobacco comment:    Vaping Use   • Vaping Use: Never used   Substance Use Topics   • Alcohol use: Yes     Comment: Maybe one glass wine very other month   • Drug use: Not Currently     Types: Oral     Comment: Cannabis gummies every night       Current Outpatient Medications Ordered in Epic   Medication Sig Dispense Refill   • ciprofloxacin (CIPRO) 500 MG Tab TAKE 1 TABLET BY MOUTH TWICE DAILY FOR 3 DAYS     • benzonatate (TESSALON) 100 MG Cap Take 1 Capsule by mouth 3 times a day as needed for Cough. 10 Capsule 0   • estrogens, conjugated (PREMARIN) 0.625 MG/GM Cream Insert 0.5 g into the vagina every day. 30 g 0   • sertraline (ZOLOFT) 50 MG Tab Take 50 mg by mouth every day.     • Non Formulary Request Take 1 Capsule by mouth every day. Puralean-weight loss OTC     • Melatonin 10 MG Cap Take 1 Capsule by mouth at bedtime.     • acetaminophen (TYLENOL) 650 MG CR tablet Take 650 mg by mouth every day.     • atenolol (TENORMIN) 50 MG Tab TAKE 1 TABLET BY MOUTH IN THE MORNING AND 1/2 AT NIGHT 135 Tablet 3   • aspirin EC (ECOTRIN) 325 MG Tablet Delayed Response Take 325 mg by mouth every day.     • lisinopril (PRINIVIL) 20 MG Tab Take 20 mg by mouth.     • levothyroxine (SYNTHROID) 125 MCG Tab Take 1 tablet Monday through Saturday, then 2 tablets on  (Patient taking differently: Take 125 mcg by mouth every morning on an empty stomach. Take 1 tablet Monday through ) 90 Tab 3   • Glucosamine-Chondroitin (MOVE FREE PO) Take 1 Tablet by mouth every morning.     • omeprazole (PRILOSEC) 20 MG CPDR Take 40 mg by mouth every morning.       No current [facility]-ordered facility-administered medications on file.       Allergies:  Bactrim [sulfamethoxazole w-trimethoprim], Diclofenac, Doxycycline,  "Hydrocodone, Sulfamethizole, Tetracycline, Tramadol, and Zpack [azithromycin]    Health Maintenance: Completed    ROS:  Gen: no fevers/chills  Eyes: no changes in vision  ENT: no sore throat  Pulm: no sob, positive for cough  CV: no chest pain  GI: no nausea/vomiting  : no dysuria, positive for dyspareunia  MSk: no myalgias  Skin: no rash  Neuro: no headaches  Psych: positive for anxiety      Objective:       Exam:  /82   Pulse 63   Temp 36.3 °C (97.4 °F) (Temporal)   Resp 16   Ht 1.626 m (5' 4\")   Wt 124 kg (274 lb)   LMP 03/30/2014   SpO2 97%   BMI 47.03 kg/m²  Body mass index is 47.03 kg/m².    Gen: Alert and oriented, No apparent distress.  Skin: Warm, dry, good turgor, no rashes in visible areas.  HEENT: Normocephalic. Eyes conjunctiva clear lids without ptosis, pupils equal and reactive to light accommodation, ears normal shape and contour, oropharynx is without erythema, edema or exudates.    Neck: Trachea midline, no masses, no thyromegaly  Lungs: Normal effort, CTA bilaterally, no wheezes, rhonchi, or rales  CV: Regular rate and rhythm. No murmurs, rubs, or gallops.  MSK: Normal gait, moves all extremities.  Neuro: Grossly non-focal.  Ext: No clubbing, cyanosis, edema.  Psych: Alert and oriented x3, normal affect and mood.     Labs: Labs from 10/1/2021 and 1/3/2022 were reviewed and discussed with the patient. All questions were answered.     Assessment & Plan:     56 y.o. female with the following -     1. Cough  - Discussed over-the-counter medications to use for symptomatic relief. Keep well-hydrated and rest.  - Educated patient that on natural course of benign viral URI's  - Twice daily use of nasal saline rinse or Neti-Pot encouraged.  - OTC anti-pyretics and decongestants as needed.  - Anticipatory guidance for symptom progression and when to seek higher level of care and how to perform self care.  - All questions answered.  - Follow-up for worsening symptoms, difficulty breathing, " lack of expected recovery, or should new symptoms or problems arise.    - benzonatate (TESSALON) 100 MG Cap; Take 1 Capsule by mouth 3 times a day as needed for Cough.  Dispense: 10 Capsule; Refill: 0    2. Vaginal dryness  Chronic.  Highly suspect that the patient is having dyspareunia from vaginal dryness.  I sent in a prescription for Premarin cream for her to start using as well as recommended enough foreplay before intercourse, using of lubrication, etc. to help with pain.  Patient will follow up as needed  - estrogens, conjugated (PREMARIN) 0.625 MG/GM Cream; Insert 0.5 g into the vagina every day.  Dispense: 30 g; Refill: 0    3. Postoperative hypothyroidism  Chronic.  Patient's TSH was in range 5 months ago, however it has been vacillating frequently so discussed checking it every 6 months to make sure she is still on the appropriate dose of levothyroxine.  - TSH WITH REFLEX TO FT4; Future    4. Anxiety  Chronic, no intervention is needed today.  Continue with sertraline 50 mg.  Patient declines referral for counseling today but can notify me in the future if she would like one.    5. Essential hypertension  Chronic, well controlled.  Patient's blood pressure is very mildly elevated in the office today, however she keeps a detailed blood pressure log at home and it has consistently been running in the 120s over 80s.  Denies side effects of medication.  She does not need any refills today.    6. Encounter for screening mammogram for breast cancer  - MA-SCREENING MAMMO BILAT W/CAD; Future    7.  Morbid obesity with BMI of 40.0-44.9, adult (HCC)  Patient is actively trying to lose weight.  Encouraged her to work on portion control as well as encouraged diet high in fruits, vegetables, and fiber. And a diet low in salt, refined carbohydrates, cholesterol, saturated fat, and trans fatty acids.  Also encouraged  a minimum of 30 minutes of moderate intensity aerobic exercise (eg, brisk walking) on five days each  week to meet recommendations.       I spent a total of 36 minutes with record review (including external notes and labs), exam, communication with the patient, communication with other providers, and documentation of this encounter.     Return in about 3 months (around 6/22/2022) for annual wellness with blood work or sooner if needed.    Please note that this dictation was created using voice recognition software. I have made every reasonable attempt to correct obvious errors, but I expect that there are errors of grammar and possibly content that I did not discover before finalizing the note.    Electronically signed by Sarita Moran PA-C on March 22, 2022

## 2022-03-22 NOTE — LETTER
[facility]  Sarita Moran P.A.-C.  [address]  [place] [address]  Fax: [phone]   Authorization for Release/Disclosure of   Protected Health Information   Name: LUANN KEMP : 1965 SSN: [id number]   Address: [address] [place] [address]  [place] [address] Phone:    [phone] (home)    I authorize the entity listed below to release/disclose the PHI below to:   [facility]/Sarita Moran P.A.-C. and Sarita Morna P.A.-C.   Provider or Entity Name:  Meng Hindsdner   Address   City, State, [facility]   Phone:      Fax:     Reason for request: continuity of care   Information to be released:    [  ] LAST COLONOSCOPY,  including any PATH REPORT and follow-up  [  ] LAST FIT/COLOGUARD RESULT [  ] LAST DEXA  [  ] LAST MAMMOGRAM  [  ] LAST PAP  [  ] LAST LABS [  ] RETINA EXAM REPORT  [  ] IMMUNIZATION RECORDS  [xx  ] Release all info      [  ] Check here and initial the line next to each item to release ALL health information INCLUDING  _____ Care and treatment for drug and / or alcohol abuse  _____ HIV testing, infection status, or AIDS  _____ Genetic Testing    DATES OF SERVICE OR TIME PERIOD TO BE DISCLOSED: _____________  I understand and acknowledge that:  * This Authorization may be revoked at any time by you in writing, except if your health information has already been used or disclosed.  * Your health information that will be used or disclosed as a result of you signing this authorization could be re-disclosed by the recipient. If this occurs, your re-disclosed health information may no longer be protected by State or Federal laws.  * You may refuse to sign this Authorization. Your refusal will not affect your ability to obtain treatment.  * This Authorization becomes effective upon signing and will  on (date) __________.      If no date is indicated, this Authorization will  one (1) year from the signature date.    Name: Luann Kemp    Signature:   Date:      3/22/2022       PLEASE FAX REQUESTED RECORDS BACK TO: [phone]

## 2022-03-22 NOTE — ASSESSMENT & PLAN NOTE
Patient reports that sexual intercourse with her  has become more painful.  She states it is normally painful for the entirety of intercourse, even if they are using lubrication.  She does report that directly after sex sometimes when she wipes there is a very minimal amount of brown spotting that goes away immediately.  Denies vaginal bleeding

## 2022-03-30 ENCOUNTER — HOSPITAL ENCOUNTER (OUTPATIENT)
Dept: RADIOLOGY | Facility: MEDICAL CENTER | Age: 57
End: 2022-03-30
Payer: COMMERCIAL

## 2022-04-06 ENCOUNTER — HOSPITAL ENCOUNTER (OUTPATIENT)
Dept: LAB | Facility: MEDICAL CENTER | Age: 57
End: 2022-04-06
Attending: PHYSICIAN ASSISTANT
Payer: COMMERCIAL

## 2022-04-06 DIAGNOSIS — E89.0 POSTOPERATIVE HYPOTHYROIDISM: ICD-10-CM

## 2022-04-06 LAB — TSH SERPL DL<=0.005 MIU/L-ACNC: 1.77 UIU/ML (ref 0.38–5.33)

## 2022-04-06 PROCEDURE — 84443 ASSAY THYROID STIM HORMONE: CPT

## 2022-04-06 PROCEDURE — 36415 COLL VENOUS BLD VENIPUNCTURE: CPT

## 2022-04-21 ENCOUNTER — HOSPITAL ENCOUNTER (OUTPATIENT)
Dept: RADIOLOGY | Facility: MEDICAL CENTER | Age: 57
End: 2022-04-21
Attending: PHYSICIAN ASSISTANT
Payer: COMMERCIAL

## 2022-04-21 DIAGNOSIS — Z12.31 VISIT FOR SCREENING MAMMOGRAM: ICD-10-CM

## 2022-04-21 PROCEDURE — 77063 BREAST TOMOSYNTHESIS BI: CPT

## 2022-05-02 ENCOUNTER — OFFICE VISIT (OUTPATIENT)
Dept: MEDICAL GROUP | Facility: PHYSICIAN GROUP | Age: 57
End: 2022-05-02
Payer: COMMERCIAL

## 2022-05-02 VITALS
TEMPERATURE: 97.9 F | OXYGEN SATURATION: 96 % | SYSTOLIC BLOOD PRESSURE: 116 MMHG | DIASTOLIC BLOOD PRESSURE: 76 MMHG | WEIGHT: 275 LBS | RESPIRATION RATE: 18 BRPM | BODY MASS INDEX: 46.95 KG/M2 | HEART RATE: 56 BPM | HEIGHT: 64 IN

## 2022-05-02 DIAGNOSIS — M25.562 ACUTE PAIN OF LEFT KNEE: ICD-10-CM

## 2022-05-02 DIAGNOSIS — F41.9 ANXIETY: ICD-10-CM

## 2022-05-02 PROBLEM — M75.100 SUPRASPINATUS SYNDROME: Status: ACTIVE | Noted: 2019-04-22

## 2022-05-02 PROBLEM — M22.40 CHONDROMALACIA OF PATELLA: Status: ACTIVE | Noted: 2022-05-02

## 2022-05-02 PROCEDURE — 99214 OFFICE O/P EST MOD 30 MIN: CPT | Performed by: PHYSICIAN ASSISTANT

## 2022-05-02 RX ORDER — DIAZEPAM 5 MG/1
TABLET ORAL
Qty: 1 TABLET | Refills: 0 | Status: SHIPPED | OUTPATIENT
Start: 2022-05-02 | End: 2022-06-02

## 2022-05-02 RX ORDER — OXYCODONE AND ACETAMINOPHEN 10; 325 MG/1; MG/1
TABLET ORAL
COMMUNITY
End: 2022-05-02

## 2022-05-02 RX ORDER — SERTRALINE HYDROCHLORIDE 100 MG/1
100 TABLET, FILM COATED ORAL DAILY
Qty: 40 TABLET | Refills: 0 | Status: SHIPPED | OUTPATIENT
Start: 2022-05-02 | End: 2022-05-25 | Stop reason: SDUPTHER

## 2022-05-02 RX ORDER — DIAZEPAM 5 MG/1
TABLET ORAL
COMMUNITY
End: 2022-05-02

## 2022-05-02 RX ORDER — LEVOTHYROXINE SODIUM 112 UG/1
TABLET ORAL
COMMUNITY
End: 2022-05-02

## 2022-05-02 RX ORDER — LEVOTHYROXINE SODIUM 0.15 MG/1
TABLET ORAL
COMMUNITY
End: 2022-05-02

## 2022-05-02 RX ORDER — LEVOTHYROXINE SODIUM 0.03 MG/1
TABLET ORAL
COMMUNITY
End: 2022-05-02

## 2022-05-02 ASSESSMENT — PATIENT HEALTH QUESTIONNAIRE - PHQ9
SUM OF ALL RESPONSES TO PHQ QUESTIONS 1-9: 8
CLINICAL INTERPRETATION OF PHQ2 SCORE: 4
5. POOR APPETITE OR OVEREATING: 0 - NOT AT ALL

## 2022-05-02 ASSESSMENT — ANXIETY QUESTIONNAIRES
7. FEELING AFRAID AS IF SOMETHING AWFUL MIGHT HAPPEN: NOT AT ALL
5. BEING SO RESTLESS THAT IT IS HARD TO SIT STILL: MORE THAN HALF THE DAYS
2. NOT BEING ABLE TO STOP OR CONTROL WORRYING: NEARLY EVERY DAY
6. BECOMING EASILY ANNOYED OR IRRITABLE: MORE THAN HALF THE DAYS
1. FEELING NERVOUS, ANXIOUS, OR ON EDGE: NEARLY EVERY DAY
GAD7 TOTAL SCORE: 16
3. WORRYING TOO MUCH ABOUT DIFFERENT THINGS: NEARLY EVERY DAY
4. TROUBLE RELAXING: NEARLY EVERY DAY

## 2022-05-02 ASSESSMENT — FIBROSIS 4 INDEX: FIB4 SCORE: 0.99

## 2022-05-02 NOTE — ASSESSMENT & PLAN NOTE
"Patient reports that about a month ago she fell down onto her left knee and has been experiencing pain since.  She fell down onto the curb of a side walk. Minimal swelling after falling. She is able to ambulate now but it is painful and it keeps \"going out\" on her. Still having some bruising. Taking tylenol for the pain. Cannot kneel down to garden now.   "

## 2022-05-02 NOTE — PROGRESS NOTES
"Subjective:     CC: Knee pain, anxiety    HPI:   Luann presents today with     Anxiety  Patient is currently on sertraline 50 mg, however recently her uncle was just diagnosed with cancer and she would like to increase her dose. Feeling very \"edgy\" more recently.     Acute pain of left knee  Patient reports that about a month ago she fell down onto her left knee and has been experiencing pain since.  She fell down onto the curb of a side walk. Minimal swelling after falling. She is able to ambulate now but it is painful and it keeps \"going out\" on her. Still having some bruising. Taking tylenol for the pain. Cannot kneel down to garden now.       Past Medical History:   Diagnosis Date   • Anesthesia     slow to wake up   • Anxiety disorder    • Arthritis      knees   • Cancer (HCC)     thyroid ca-thyroidectomy. Radiation tx. No chemo.   • Chickenpox as child   • Chronic nausea    • Dental disorder     upper/lower dentures   • Edema 10/2021    to ankles bilateral. 21-continues, followed by PCP.   • Elevated alkaline phosphatase level    • Essential hypertension    • Fatty liver    • GERD (gastroesophageal reflux disease)    • Heart burn     taking omeprazole   • Hypothyroidism    • Indigestion     taking omeprazole   • Pain 2017    left wrist, 6/10   • Palpitations    • Rheumatic fever as child   • Seasonal allergies    • Sleep apnea     CPAP with Oxygen 2L @ night   • Snoring     no sleep study   • Unspecified disorder of thyroid     thyroid cancer w/ radiation/surgery       Social History     Tobacco Use   • Smoking status: Former Smoker     Packs/day: 0.50     Years: 5.00     Pack years: 2.50     Types: Cigarettes     Quit date: 1996     Years since quittin.3   • Smokeless tobacco: Never Used   • Tobacco comment:    Vaping Use   • Vaping Use: Never used   Substance Use Topics   • Alcohol use: Yes     Comment: Maybe one glass wine very other month   • Drug use: Not Currently     " "Types: Oral     Comment: Cannabis gummies every night       Current Outpatient Medications Ordered in Epic   Medication Sig Dispense Refill   • Omeprazole Magnesium (PRILOSEC PO) Prilosec     • sertraline (ZOLOFT) 100 MG Tab Take 1 Tablet by mouth every day. 40 Tablet 0   • diazePAM (VALIUM) 5 MG Tab TAKE 1 TABLET BY MOUTH PRIOR TO MRI SCAN 1 Tablet 0   • estrogens, conjugated (PREMARIN) 0.625 MG/GM Cream Insert 0.5 g into the vagina every day. 30 g 0   • Melatonin 10 MG Cap Take 1 Capsule by mouth at bedtime.     • acetaminophen (TYLENOL) 650 MG CR tablet Take 650 mg by mouth every day.     • atenolol (TENORMIN) 50 MG Tab TAKE 1 TABLET BY MOUTH IN THE MORNING AND 1/2 AT NIGHT 135 Tablet 3   • aspirin EC (ECOTRIN) 325 MG Tablet Delayed Response Take 325 mg by mouth every day.     • lisinopril (PRINIVIL) 20 MG Tab Take 20 mg by mouth.     • levothyroxine (SYNTHROID) 125 MCG Tab Take 1 tablet Monday through Saturday, then 2 tablets on Sunday (Patient taking differently: Take 125 mcg by mouth every morning on an empty stomach. Take 1 tablet Monday through Sunday) 90 Tab 3   • Glucosamine-Chondroitin (MOVE FREE PO) Take 1 Tablet by mouth every morning.     • omeprazole (PRILOSEC) 20 MG CPDR Take 40 mg by mouth every morning.       No current [facility]-ordered facility-administered medications on file.       Allergies:  Bactrim [sulfamethoxazole w-trimethoprim], Diclofenac, Doxycycline, Hydrocodone, Sulfamethizole, Tetracycline, Tramadol, Zpack [azithromycin], and Sulfamethoxazole    Health Maintenance: Completed    ROS:  Gen: no fevers/chills  Eyes: no changes in vision  ENT: no sore throat  Pulm: no sob, no cough  CV: no chest pain  GI: no nausea/vomiting  : no dysuria  MSk: Positive for knee pain  Skin: no rash  Neuro: no headaches  Psych: Positive for depression and anxiety    Objective:       Exam:  /76   Pulse (!) 56   Temp 36.6 °C (97.9 °F) (Temporal)   Resp 18   Ht 1.626 m (5' 4\")   Wt 125 kg (275 lb)   " LMP 03/30/2014   SpO2 96%   BMI 47.20 kg/m²  Body mass index is 47.2 kg/m².    Gen: Alert and oriented, No apparent distress.  Skin: Warm, dry, good turgor, no rashes in visible areas.  HEENT: Normocephalic. Eyes conjunctiva clear lids without ptosis, pupils equal and reactive to light accommodation, ears normal shape and contour  Neck: Trachea midline, no masses, no thyromegaly  Lungs: Normal effort, CTA bilaterally, no wheezes, rhonchi, or rales  CV: Bradycardic and regular rhythm. No murmurs, rubs, or gallops.  MSK: Normal gait, moves all extremities. Knee (difficult to perform secondary to body habitus): Decreased ROM of left knee with flexion, full strength, TTP over patella and surrounding areas, edema minimal, varus/valgus stress negative, anterior/posterior drawer negative, Lachman's difficult to perform secondary to body habitus, limping to favor right leg  Neuro: Grossly non-focal.  Ext: No clubbing, cyanosis, edema.  Psych: Alert and oriented x3, normal affect and mood.     Depression Screening    Little interest or pleasure in doing things?  2 - more than half the days   Feeling down, depressed , or hopeless? 2 - more than half the days   Trouble falling or staying asleep, or sleeping too much?  0 - not at all   Feeling tired or having little energy?  2 - more than half the days   Poor appetite or overeating?  0 - not at all   Feeling bad about yourself - or that you are a failure or have let yourself or your family down? 2 - more than half the days   Trouble concentrating on things, such as reading the newspaper or watching television? 0 - not at all   Moving or speaking so slowly that other people could have noticed.  Or the opposite - being so fidgety or restless that you have been moving around a lot more than usual?  0 - not at all   Thoughts that you would be better off dead, or of hurting yourself?  0 - not at all   Patient Health Questionnaire Score: 8     If depressive symptoms identified  deferred to follow up visit unless specifically addressed in assesment and plan.    Interpretation of PHQ-9 Total Score   Score Severity   1-4 No Depression   5-9 Mild Depression   10-14 Moderate Depression   15-19 Moderately Severe Depression   20-27 Severe Depression    DEVI-7 Questionnaire    Feeling nervous, anxious, or on edge: Nearly every day  Not being able to sop or control worrying: Nearly every day  Worrying too much about different things: Nearly every day  Trouble relaxing: Nearly every day  Being so restless that it's hard to sit still: More than half the days  Becoming easily annoyed or irritable: More than half the days  Feeling afraid as if something awful might happen: Not at all  Total: 16    Interpretation of DEVI 7 Total Score   Score Severity :  0-4 No Anxiety   5-9 Mild Anxiety  10-14 Moderate Anxiety  15-21 Severe Anxiety      Assessment & Plan:     56 y.o. female with the following -     1. Anxiety  Chronic, not well controlled.  Patient reports that her anxiety and depression have been worse lately because her uncle was recently diagnosed with lung cancer.  Discussed adjustment disorder.  Patient would like to increase her dose of sertraline 200 mg.  We will have her follow-up with me in 4 weeks for reevaluation and medication management.  She does not think that counseling would be helpful for her and declined referral to behavioral health today.  She denies SI but is instructed that if she is feeling suicidal she needs to call 911 or go to the emergency room.   - sertraline (ZOLOFT) 100 MG Tab; Take 1 Tablet by mouth every day.  Dispense: 40 Tablet; Refill: 0    2. Acute pain of left knee  Acute on chronic.  Patient does report that prior to falling on her knees she was experiencing new weakness and a sensation of her knee buckling.  Since falling on her knee a month ago, she does feel like her knee has been buckling more frequently.  She is markedly tender over all areas of her patella in  the surrounding areas.  She also has pain with any manipulation of her knee.  Discussed different treatment options including referral to orthopedics which she declined today, MRI, or an x-ray.  Patient would like an MRI ordered.  Discussed if necessary based off the results I can send in a referral to orthopedics.  Patient is severely claustrophobic and requires sedation for MRIs.  Prescription for 1 diazepam sent in today for her to take prior to MRI scan.  She is instructed to have somebody take her to her appointment she cannot drive after taking the medication.  In the meantime, we will have the patient work on RICE with minimal weight bearing activities until we have the imaging results.  Patient may benefit from physical therapy as well, but will hold off until we have the imaging results.    I have obtained and reviewed patient utilization report from [facility] pharmacy database prior to writing prescription for controlled substance II, III or IV. Based on the report and my clinical assessment the prescription is medically necessary. Patient is cautioned on sedation potential of narcotic medication; no drinking, driving or operating heavy machinery while on this medication.  I have also made a good divine effort to obtain applicable records from other providers who have treated the patient and records did not demonstrate any increased risk of substance abuse that would prevent me from prescribing controlled substances.  I have discussed the risk and benefits, treatment plan, and alternative therapies with the patient.     - MR-KNEE-W/O LEFT; Future  - diazePAM (VALIUM) 5 MG Tab; TAKE 1 TABLET BY MOUTH PRIOR TO MRI SCAN  Dispense: 1 Tablet; Refill: 0    I spent a total of 32 minutes with record review (including external notes and labs), exam, communication with the patient, communication with other providers, and documentation of this encounter.     Return for Follow-up imaging or sooner if  needed.    Please note that this dictation was created using voice recognition software. I have made every reasonable attempt to correct obvious errors, but I expect that there are errors of grammar and possibly content that I did not discover before finalizing the note.    Electronically signed by Sarita Moran PA-C on May 2, 2022

## 2022-05-02 NOTE — ASSESSMENT & PLAN NOTE
"Patient is currently on sertraline 50 mg, however recently her uncle was just diagnosed with cancer and she would like to increase her dose. Feeling very \"edgy\" more recently.   "

## 2022-05-04 ENCOUNTER — HOSPITAL ENCOUNTER (OUTPATIENT)
Dept: RADIOLOGY | Facility: MEDICAL CENTER | Age: 57
End: 2022-05-04
Attending: PHYSICIAN ASSISTANT
Payer: COMMERCIAL

## 2022-05-04 DIAGNOSIS — R92.8 ABNORMAL MAMMOGRAM: ICD-10-CM

## 2022-05-04 PROCEDURE — 76642 ULTRASOUND BREAST LIMITED: CPT | Mod: LT

## 2022-05-04 PROCEDURE — G0279 TOMOSYNTHESIS, MAMMO: HCPCS

## 2022-05-12 ENCOUNTER — HOSPITAL ENCOUNTER (OUTPATIENT)
Dept: RADIOLOGY | Facility: MEDICAL CENTER | Age: 57
End: 2022-05-12
Attending: PHYSICIAN ASSISTANT
Payer: COMMERCIAL

## 2022-05-12 DIAGNOSIS — M25.562 ACUTE PAIN OF LEFT KNEE: ICD-10-CM

## 2022-05-12 PROCEDURE — 73721 MRI JNT OF LWR EXTRE W/O DYE: CPT | Mod: LT

## 2022-05-15 DIAGNOSIS — S83.272D COMPLEX TEAR OF LATERAL MENISCUS OF LEFT KNEE AS CURRENT INJURY, SUBSEQUENT ENCOUNTER: ICD-10-CM

## 2022-05-15 NOTE — PROGRESS NOTES
Referral to ortho sent    IMPRESSION:     1.  Complex type tear involving the anterior horn lateral meniscus which also involves the anterior meniscal root. There is mild meniscal extrusion.     2.  Chronic partial-thickness tear/sprain of the anterior cruciate ligament with early mucoid degeneration.     3.  Tricompartment osteoarthritis.     4.  Small joint effusion.     5.  Small popliteal cyst.

## 2022-05-16 ENCOUNTER — TELEPHONE (OUTPATIENT)
Dept: MEDICAL GROUP | Facility: PHYSICIAN GROUP | Age: 57
End: 2022-05-16
Payer: COMMERCIAL

## 2022-05-25 DIAGNOSIS — F41.9 ANXIETY: ICD-10-CM

## 2022-05-25 RX ORDER — SERTRALINE HYDROCHLORIDE 100 MG/1
100 TABLET, FILM COATED ORAL DAILY
Qty: 40 TABLET | Refills: 0 | Status: SHIPPED | OUTPATIENT
Start: 2022-05-25 | End: 2022-07-01 | Stop reason: SDUPTHER

## 2022-06-02 ENCOUNTER — PRE-ADMISSION TESTING (OUTPATIENT)
Dept: ADMISSIONS | Facility: MEDICAL CENTER | Age: 57
End: 2022-06-02
Attending: ORTHOPAEDIC SURGERY
Payer: COMMERCIAL

## 2022-06-02 DIAGNOSIS — Z01.812 PRE-OPERATIVE LABORATORY EXAMINATION: ICD-10-CM

## 2022-06-02 DIAGNOSIS — Z01.810 PRE-OPERATIVE CARDIOVASCULAR EXAMINATION: ICD-10-CM

## 2022-06-02 LAB
ALBUMIN SERPL BCP-MCNC: 4.1 G/DL (ref 3.2–4.9)
ALBUMIN/GLOB SERPL: 1.3 G/DL
ALP SERPL-CCNC: 129 U/L (ref 30–99)
ALT SERPL-CCNC: 13 U/L (ref 2–50)
ANION GAP SERPL CALC-SCNC: 9 MMOL/L (ref 7–16)
AST SERPL-CCNC: 16 U/L (ref 12–45)
BILIRUB SERPL-MCNC: 0.3 MG/DL (ref 0.1–1.5)
BUN SERPL-MCNC: 14 MG/DL (ref 8–22)
CALCIUM SERPL-MCNC: 9 MG/DL (ref 8.4–10.2)
CHLORIDE SERPL-SCNC: 103 MMOL/L (ref 96–112)
CO2 SERPL-SCNC: 27 MMOL/L (ref 20–33)
CREAT SERPL-MCNC: 1.03 MG/DL (ref 0.5–1.4)
GFR SERPLBLD CREATININE-BSD FMLA CKD-EPI: 63 ML/MIN/1.73 M 2
GLOBULIN SER CALC-MCNC: 3.2 G/DL (ref 1.9–3.5)
GLUCOSE SERPL-MCNC: 83 MG/DL (ref 65–99)
POTASSIUM SERPL-SCNC: 4.5 MMOL/L (ref 3.6–5.5)
PROT SERPL-MCNC: 7.3 G/DL (ref 6–8.2)
SODIUM SERPL-SCNC: 139 MMOL/L (ref 135–145)

## 2022-06-02 PROCEDURE — 80053 COMPREHEN METABOLIC PANEL: CPT

## 2022-06-02 PROCEDURE — 36415 COLL VENOUS BLD VENIPUNCTURE: CPT

## 2022-06-02 PROCEDURE — 93005 ELECTROCARDIOGRAM TRACING: CPT

## 2022-06-02 RX ORDER — CHOLECALCIFEROL (VITAMIN D3) 125 MCG
1000 CAPSULE ORAL DAILY
COMMUNITY
End: 2022-11-29

## 2022-06-02 ASSESSMENT — FIBROSIS 4 INDEX: FIB4 SCORE: 0.99

## 2022-06-02 NOTE — PREPROCEDURE INSTRUCTIONS
55 y/o female.  BMI= 45.94.  ILYA with C-PAP.  Fatty liver, HTN.  METS >4.  Ordered CMP, EKG today.  Please advise if further work-up needed.  Thanks, Heather HERNADEZ

## 2022-06-02 NOTE — PREPROCEDURE INSTRUCTIONS
Per anesthesia protocol instructed to take the following medications DOS: Tenormin. Synthroid, Prilosec, Zoloft.

## 2022-06-03 LAB — EKG IMPRESSION: NORMAL

## 2022-06-03 PROCEDURE — 93010 ELECTROCARDIOGRAM REPORT: CPT | Performed by: INTERNAL MEDICINE

## 2022-06-03 NOTE — OR NURSING
Medical clearance request faxed and called to Dr. Reyes's office speaking to Nicole, Surgery Scheduler.

## 2022-06-05 ENCOUNTER — ANESTHESIA EVENT (OUTPATIENT)
Dept: SURGERY | Facility: MEDICAL CENTER | Age: 57
End: 2022-06-05
Payer: COMMERCIAL

## 2022-06-06 ENCOUNTER — ANESTHESIA (OUTPATIENT)
Dept: SURGERY | Facility: MEDICAL CENTER | Age: 57
End: 2022-06-06
Payer: COMMERCIAL

## 2022-06-06 ENCOUNTER — HOSPITAL ENCOUNTER (OUTPATIENT)
Facility: MEDICAL CENTER | Age: 57
End: 2022-06-06
Attending: ORTHOPAEDIC SURGERY | Admitting: ORTHOPAEDIC SURGERY
Payer: COMMERCIAL

## 2022-06-06 VITALS
RESPIRATION RATE: 18 BRPM | HEIGHT: 64 IN | SYSTOLIC BLOOD PRESSURE: 145 MMHG | DIASTOLIC BLOOD PRESSURE: 73 MMHG | OXYGEN SATURATION: 98 % | TEMPERATURE: 96.8 F | WEIGHT: 272.05 LBS | HEART RATE: 56 BPM | BODY MASS INDEX: 46.45 KG/M2

## 2022-06-06 PROCEDURE — 160035 HCHG PACU - 1ST 60 MINS PHASE I: Performed by: ORTHOPAEDIC SURGERY

## 2022-06-06 PROCEDURE — 01400 ANES OPN/ARTHRS KNEE JT NOS: CPT | Performed by: ANESTHESIOLOGY

## 2022-06-06 PROCEDURE — 160029 HCHG SURGERY MINUTES - 1ST 30 MINS LEVEL 4: Performed by: ORTHOPAEDIC SURGERY

## 2022-06-06 PROCEDURE — 160046 HCHG PACU - 1ST 60 MINS PHASE II: Performed by: ORTHOPAEDIC SURGERY

## 2022-06-06 PROCEDURE — 160041 HCHG SURGERY MINUTES - EA ADDL 1 MIN LEVEL 4: Performed by: ORTHOPAEDIC SURGERY

## 2022-06-06 PROCEDURE — 700111 HCHG RX REV CODE 636 W/ 250 OVERRIDE (IP): Performed by: ORTHOPAEDIC SURGERY

## 2022-06-06 PROCEDURE — 700101 HCHG RX REV CODE 250: Performed by: ORTHOPAEDIC SURGERY

## 2022-06-06 PROCEDURE — 160036 HCHG PACU - EA ADDL 30 MINS PHASE I: Performed by: ORTHOPAEDIC SURGERY

## 2022-06-06 PROCEDURE — 160009 HCHG ANES TIME/MIN: Performed by: ORTHOPAEDIC SURGERY

## 2022-06-06 PROCEDURE — A9270 NON-COVERED ITEM OR SERVICE: HCPCS | Performed by: ANESTHESIOLOGY

## 2022-06-06 PROCEDURE — 700102 HCHG RX REV CODE 250 W/ 637 OVERRIDE(OP): Performed by: ANESTHESIOLOGY

## 2022-06-06 PROCEDURE — 160002 HCHG RECOVERY MINUTES (STAT): Performed by: ORTHOPAEDIC SURGERY

## 2022-06-06 PROCEDURE — 160025 RECOVERY II MINUTES (STATS): Performed by: ORTHOPAEDIC SURGERY

## 2022-06-06 PROCEDURE — 700105 HCHG RX REV CODE 258: Performed by: ORTHOPAEDIC SURGERY

## 2022-06-06 PROCEDURE — 160048 HCHG OR STATISTICAL LEVEL 1-5: Performed by: ORTHOPAEDIC SURGERY

## 2022-06-06 PROCEDURE — 700111 HCHG RX REV CODE 636 W/ 250 OVERRIDE (IP): Performed by: ANESTHESIOLOGY

## 2022-06-06 RX ORDER — HYDRALAZINE HYDROCHLORIDE 20 MG/ML
5 INJECTION INTRAMUSCULAR; INTRAVENOUS
Status: DISCONTINUED | OUTPATIENT
Start: 2022-06-06 | End: 2022-06-06 | Stop reason: HOSPADM

## 2022-06-06 RX ORDER — MIDAZOLAM HYDROCHLORIDE 1 MG/ML
INJECTION INTRAMUSCULAR; INTRAVENOUS PRN
Status: DISCONTINUED | OUTPATIENT
Start: 2022-06-06 | End: 2022-06-06 | Stop reason: SURG

## 2022-06-06 RX ORDER — ONDANSETRON 2 MG/ML
INJECTION INTRAMUSCULAR; INTRAVENOUS PRN
Status: DISCONTINUED | OUTPATIENT
Start: 2022-06-06 | End: 2022-06-06 | Stop reason: SURG

## 2022-06-06 RX ORDER — OXYCODONE HCL 5 MG/5 ML
10 SOLUTION, ORAL ORAL
Status: DISCONTINUED | OUTPATIENT
Start: 2022-06-06 | End: 2022-06-06 | Stop reason: HOSPADM

## 2022-06-06 RX ORDER — CEFAZOLIN SODIUM 1 G/3ML
INJECTION, POWDER, FOR SOLUTION INTRAMUSCULAR; INTRAVENOUS PRN
Status: DISCONTINUED | OUTPATIENT
Start: 2022-06-06 | End: 2022-06-06 | Stop reason: SURG

## 2022-06-06 RX ORDER — ONDANSETRON 2 MG/ML
4 INJECTION INTRAMUSCULAR; INTRAVENOUS
Status: DISCONTINUED | OUTPATIENT
Start: 2022-06-06 | End: 2022-06-06 | Stop reason: HOSPADM

## 2022-06-06 RX ORDER — EPINEPHRINE 1 MG/ML(1)
AMPUL (ML) INJECTION
Status: DISCONTINUED | OUTPATIENT
Start: 2022-06-06 | End: 2022-06-06 | Stop reason: HOSPADM

## 2022-06-06 RX ORDER — DIPHENHYDRAMINE HYDROCHLORIDE 50 MG/ML
12.5 INJECTION INTRAMUSCULAR; INTRAVENOUS
Status: DISCONTINUED | OUTPATIENT
Start: 2022-06-06 | End: 2022-06-06 | Stop reason: HOSPADM

## 2022-06-06 RX ORDER — SODIUM CHLORIDE, SODIUM LACTATE, POTASSIUM CHLORIDE, CALCIUM CHLORIDE 600; 310; 30; 20 MG/100ML; MG/100ML; MG/100ML; MG/100ML
INJECTION, SOLUTION INTRAVENOUS CONTINUOUS
Status: DISCONTINUED | OUTPATIENT
Start: 2022-06-06 | End: 2022-06-06 | Stop reason: HOSPADM

## 2022-06-06 RX ORDER — ALBUTEROL SULFATE 2.5 MG/3ML
2.5 SOLUTION RESPIRATORY (INHALATION)
Status: DISCONTINUED | OUTPATIENT
Start: 2022-06-06 | End: 2022-06-06 | Stop reason: HOSPADM

## 2022-06-06 RX ORDER — DEXAMETHASONE SODIUM PHOSPHATE 4 MG/ML
INJECTION, SOLUTION INTRA-ARTICULAR; INTRALESIONAL; INTRAMUSCULAR; INTRAVENOUS; SOFT TISSUE PRN
Status: DISCONTINUED | OUTPATIENT
Start: 2022-06-06 | End: 2022-06-06 | Stop reason: SURG

## 2022-06-06 RX ORDER — OXYCODONE HCL 5 MG/5 ML
5 SOLUTION, ORAL ORAL
Status: DISCONTINUED | OUTPATIENT
Start: 2022-06-06 | End: 2022-06-06 | Stop reason: HOSPADM

## 2022-06-06 RX ORDER — HALOPERIDOL 5 MG/ML
1 INJECTION INTRAMUSCULAR
Status: DISCONTINUED | OUTPATIENT
Start: 2022-06-06 | End: 2022-06-06 | Stop reason: HOSPADM

## 2022-06-06 RX ORDER — BUPIVACAINE HYDROCHLORIDE 2.5 MG/ML
INJECTION, SOLUTION EPIDURAL; INFILTRATION; INTRACAUDAL
Status: DISCONTINUED | OUTPATIENT
Start: 2022-06-06 | End: 2022-06-06 | Stop reason: HOSPADM

## 2022-06-06 RX ORDER — ACETAMINOPHEN 500 MG
1000 TABLET ORAL ONCE
Status: COMPLETED | OUTPATIENT
Start: 2022-06-06 | End: 2022-06-06

## 2022-06-06 RX ORDER — KETOROLAC TROMETHAMINE 30 MG/ML
INJECTION, SOLUTION INTRAMUSCULAR; INTRAVENOUS PRN
Status: DISCONTINUED | OUTPATIENT
Start: 2022-06-06 | End: 2022-06-06 | Stop reason: SURG

## 2022-06-06 RX ADMIN — SODIUM CHLORIDE, POTASSIUM CHLORIDE, SODIUM LACTATE AND CALCIUM CHLORIDE: 600; 310; 30; 20 INJECTION, SOLUTION INTRAVENOUS at 13:15

## 2022-06-06 RX ADMIN — FENTANYL CITRATE 100 MCG: 50 INJECTION, SOLUTION INTRAMUSCULAR; INTRAVENOUS at 14:10

## 2022-06-06 RX ADMIN — CEFAZOLIN 3 G: 330 INJECTION, POWDER, FOR SOLUTION INTRAMUSCULAR; INTRAVENOUS at 14:10

## 2022-06-06 RX ADMIN — PROPOFOL 200 MG: 10 INJECTION, EMULSION INTRAVENOUS at 14:10

## 2022-06-06 RX ADMIN — KETOROLAC TROMETHAMINE 30 MG: 30 INJECTION, SOLUTION INTRAMUSCULAR at 14:57

## 2022-06-06 RX ADMIN — MIDAZOLAM HYDROCHLORIDE 2 MG: 1 INJECTION, SOLUTION INTRAMUSCULAR; INTRAVENOUS at 14:05

## 2022-06-06 RX ADMIN — ONDANSETRON 4 MG: 2 INJECTION INTRAMUSCULAR; INTRAVENOUS at 14:29

## 2022-06-06 RX ADMIN — LIDOCAINE HYDROCHLORIDE 0.5 ML: 10 INJECTION, SOLUTION EPIDURAL; INFILTRATION; INTRACAUDAL; PERINEURAL at 13:15

## 2022-06-06 RX ADMIN — ACETAMINOPHEN 1000 MG: 500 TABLET, FILM COATED ORAL at 13:14

## 2022-06-06 RX ADMIN — DEXAMETHASONE SODIUM PHOSPHATE 6 MG: 4 INJECTION, SOLUTION INTRAMUSCULAR; INTRAVENOUS at 14:29

## 2022-06-06 ASSESSMENT — PAIN SCALES - GENERAL: PAIN_LEVEL: 6

## 2022-06-06 ASSESSMENT — FIBROSIS 4 INDEX: FIB4 SCORE: 1.2

## 2022-06-06 NOTE — DISCHARGE INSTRUCTIONS
ACTIVITY: Rest and take it easy for the first 24 hours.  A responsible adult is recommended to remain with you during that time.  It is normal to feel sleepy.  We encourage you to not do anything that requires balance, judgment or coordination.    MILD FLU-LIKE SYMPTOMS ARE NORMAL. YOU MAY EXPERIENCE GENERALIZED MUSCLE ACHES, THROAT IRRITATION, HEADACHE AND/OR SOME NAUSEA.    FOR 24 HOURS DO NOT:  Drive, operate machinery or run household appliances.  Drink beer or alcoholic beverages.   Make important decisions or sign legal documents.    SPECIAL INSTRUCTIONS: Activity is to be weight bearing as tolerated. Ice and elevate.    DIET: To avoid nausea, slowly advance diet as tolerated, avoiding spicy or greasy foods for the first day.  Add more substantial food to your diet according to your physician's instructions.  INCREASE FLUIDS AND FIBER TO AVOID CONSTIPATION.    SURGICAL DRESSING/BATHING: Keep dressings clean and dry. Shower in 2 days with wound covered.    FOLLOW-UP APPOINTMENT:  A follow-up appointment should be arranged with your doctor in; call to schedule.    You should CALL YOUR PHYSICIAN if you develop:  Fever greater than 101 degrees F.  Pain not relieved by medication, or persistent nausea or vomiting.  Excessive bleeding (blood soaking through dressing) or unexpected drainage from the wound.  Extreme redness or swelling around the incision site, drainage of pus or foul smelling drainage.  Inability to urinate or empty your bladder within 8 hours.    You should call 911 if you develop problems with breathing or chest pain.    If you are unable to contact your doctor or surgical center, you should go to the nearest emergency room or urgent care center.      Physician's telephone #: Dr. Reyes [phone]    If any questions arise, call your doctor.  If your doctor is not available, please feel free to call the Surgical Center at [phone].     A registered nurse may call you a few days after your  surgery to see how you are doing after your procedure.    MEDICATIONS: Resume taking daily medication.  Take prescribed pain medication with food.  If no medication is prescribed, you may take non-aspirin pain medication if needed.  PAIN MEDICATION CAN BE VERY CONSTIPATING.  Take a stool softener or laxative such as senokot, pericolace, or milk of magnesia if needed.    Prescription given pre-operatively     Last pain medication given: None    If your physician has prescribed pain medication that includes Acetaminophen (Tylenol), do not take additional Acetaminophen (Tylenol) while taking the prescribed medication.    Depression / Suicide Risk    As you are discharged from this [facility], it is important to learn how to keep safe from harming yourself.    Recognize the warning signs:  Abrupt changes in personality, positive or negative- including increase in energy   Giving away possessions  Change in eating patterns- significant weight changes-  positive or negative  Change in sleeping patterns- unable to sleep or sleeping all the time   Unwillingness or inability to communicate  Depression  Unusual sadness, discouragement and loneliness  Talk of wanting to die  Neglect of personal appearance   Rebelliousness- reckless behavior  Withdrawal from people/activities they love  Confusion- inability to concentrate     If you or a loved one observes any of these behaviors or has concerns about self-harm, here's what you can do:  Talk about it- your feelings and reasons for harming yourself  Remove any means that you might use to hurt yourself (examples: pills, rope, extension cords, firearm)  Get professional help from the community (Mental Health, Substance Abuse, psychological counseling)  Do not be alone:Call your Safe Contact- someone whom you trust who will be there for you.  Call your local CRISIS HOTLINE 326-5173 or 160-490-6605  Call your local Children's Mobile Crisis Response Team [facility]  (148) 411-2174 or [email or web address].Auto[employer].com  Call the toll free National Suicide Prevention Hotlines   National Suicide Prevention Lifeline 236-831-FEVG (8220)  National Community [facility] Line Network 800-SUICIDE (934-2403)

## 2022-06-06 NOTE — ANESTHESIA TIME REPORT
Anesthesia Start and Stop Event Times     Date Time Event    6/6/2022 02:02 PM Ready for Procedure    6/6/2022 02:05 PM Anesthesia Start    6/6/2022 03:09 PM Anesthesia Stop        Responsible Staff  06/06/22    Name Role Begin End    Ad Pineda M.D. Anesthesiologist 06/06/22 02:05 PM 06/06/22 03:09 PM        Overtime Reason:  no overtime (within assigned shift)    Comments:

## 2022-06-06 NOTE — ANESTHESIA PROCEDURE NOTES
Airway    Date/Time: 6/6/2022 2:10 PM  Performed by: Ad Pineda M.D.  Authorized by: Ad Pineda M.D.     Location:  OR  Urgency:  Elective  Indications for Airway Management:  Anesthesia      Spontaneous Ventilation: absent    Sedation Level:  Deep  Preoxygenated: Yes    Patient Position:  Sniffing  MILS Maintained Throughout: No    Mask Difficulty Assessment:  0 - not attempted  Final Airway Type:  Supraglottic airway  Final Supraglottic Airway:  Standard LMA    Number of Attempts at Approach:  1  Number of Other Approaches Attempted:  0

## 2022-06-06 NOTE — OP REPORT
DATE OF SERVICE:  06/06/2022     PREOPERATIVE DIAGNOSIS:  Left knee lateral meniscal tear.     POSTOPERATIVE DIAGNOSES:  1.  Left knee anterior horn lateral meniscal tear.  2.  Left knee medial meniscal tear.  3.  Left knee partial ACL tear.  4.  Left knee grade 3 chondral lesions of trochlea, medial femoral condyle and   lateral femoral condyle.     PROCEDURES:  1.  Left knee arthroscopy with partial lateral and medial meniscectomies.  2.  Left knee arthroscopy with chondroplasty of trochlea, medial femoral   condyle, and lateral femoral condyle.  3.  Left knee arthroscopy with debridement of partial ACL tear.     SURGEON:  Davion Reyes MD     ANESTHESIA:  General.     COMPLICATIONS:  None.     ESTIMATED BLOOD LOSS:  Minimal.     TOURNIQUET:  None.     INDICATIONS:  The patient is a 56-year-old woman with a history of left knee   pain and swelling over the last year.  MRI shows complex anterior horn lateral   meniscal tear and mucoid degeneration of the ACL.  She is indicated for   arthroscopic management.     FINDINGS:  Exam under anesthesia revealed a moderate effusion, full range of   motion, Lachman's equivocal.  No varus, valgus or posterior laxity.    Arthroscopic examination of the suprapatellar pouch and medial and lateral   gutters was unremarkable.  Examination of the patella revealed grade II   chondrosis.  Examination of the trochlea revealed grade 3 diffuse chondrosis   centrally with some loose chondral flaps and fibrillation.  Examination of the   notch revealed thickening and longitudinal fraying of the ACL.  The ACL   really did not take up tension well with anterior drawer and was laxed to   probing.  There was a prominent anterior horn lateral meniscal tear with a   flap flipped anteriorly.  There was significant longitudinal splitting and   fraying of the anterior horn of the lateral meniscus, mild grade 2 chondrosis   in the lateral compartment with a small less than 1 cm diameter, grade 3    chondral lesion in the central weightbearing portion of the lateral femoral   condyle.  Examination of the medial femoral condyle revealed about 15 mm   central weightbearing grade 3 chondral lesion with some loose chondral flaps.    There was tearing and fraying of the free edge of the posterior horn of the   medial meniscus.     PROCEDURE:  Following induction of general anesthesia, time-out was performed   confirming patient, site, and procedure.  Three grams of Ancef IV were ordered   and administered.  The left thigh tourniquet and thigh loja were applied   and the right leg was placed in a well leg loja.  Under sterile conditions,   the left knee was injected with 30 mL of 0.25% plain Marcaine in the standard   fashion.  Left lower extremity was then prepped and draped in the usual   fashion.  Standard anterolateral and anteromedial portals with supralateral   outflow were established and diagnostic arthroscopy was performed with   findings as above.  The shaver was used to debride some loose fronds of the   ACL, particularly where it impinged in the anterolateral compartment.  The   shaver was then used to resect the unstable anterior horn lateral meniscal   flap as well as the frayed strands of lateral meniscus.  The lateral meniscus   was trimmed back to a stable configuration.  The grade 3 chondral lesion was   debrided with the shaver.  Next, the shaver was used to resect the loose flaps   from the free edge of the posterior horn of the medial meniscus.  Fibrillated   articular cartilage on the medial femoral condyle was debrided with the   shaver.  Next, the shaver was used to debride the fibrillated articular   cartilage in the central trochlea.  The arthroscope was withdrawn.  The   portals were closed with nylon sutures.  The 30 mL of 0.25% plain Marcaine was   injected into the joint.  Sterile dressing was applied followed by ZAKIA hose   and knee immobilizer.  The patient was awakened and  extubated in the operating   room and transferred to recovery room in stable condition.        ______________________________  MD AMY Arizmendi/AMBER    DD:  06/06/2022 15:07  DT:  06/06/2022 15:49    Job#:  [id number]

## 2022-06-06 NOTE — ANESTHESIA POSTPROCEDURE EVALUATION
Patient: Luann Morales    Procedure Summary     Date: 06/06/22 Room / Location:  OR  / SURGERY [facility] ER    Anesthesia Start: 1405 Anesthesia Stop: 1509    Procedures:       ARTHROSCOPY, KNEE (Left Knee)      MENISCECTOMY, KNEE, MEDIAL/LATERAL - PARTIAL (Left Knee) Diagnosis: (TEAR OF LATERAL MENISCUS OF KNEE - LEFT)    Surgeons: Davion Reyes M.D. Responsible Provider: Ad Pineda M.D.    Anesthesia Type: general ASA Status: 3          Final Anesthesia Type: general  Last vitals  BP   Blood Pressure: (Abnormal) 140/76    Temp   36.4 °C (97.5 °F)    Pulse   (Abnormal) 52   Resp   12    SpO2   94 %      Anesthesia Post Evaluation    Patient location during evaluation: PACU  Patient participation: complete - patient participated  Level of consciousness: awake and alert  Pain score: 6    Airway patency: patent  Anesthetic complications: no  Cardiovascular status: hemodynamically stable  Respiratory status: acceptable  Hydration status: euvolemic    PONV: none          No complications documented.     Nurse Pain Score: 6 (NPRS)

## 2022-06-06 NOTE — ANESTHESIA PREPROCEDURE EVALUATION
Case: [id number] Date/Time: 06/06/22 8624    Procedures:       ARTHROSCOPY, KNEE (Left )      MENISCECTOMY, KNEE, MEDIAL/LATERAL - PARTIAL    Pre-op diagnosis: TEAR OF LATERAL MENISCUS OF KNEE - LEFT    Location: [facility] OR 04 / SURGERY [facility]    Surgeons: Davion Reyes M.D.          Relevant Problems   ANESTHESIA   (positive) ILYA (obstructive sleep apnea)      CARDIAC   (positive) Essential hypertension      GI   (positive) GERD (gastroesophageal reflux disease)         (positive) Fatty liver      ENDO   (positive) Hypothyroidism      Other   (positive) Acute pain of left knee   (positive) BMI 45.0-49.9, adult (HCC)   (positive) Iron deficiency anemia       Physical Exam    Airway   Mallampati: II  TM distance: >3 FB  Neck ROM: full       Cardiovascular - normal exam  Rhythm: regular  Rate: normal  (-) murmur     Dental - normal exam  (+) upper dentures, lower dentures             Pulmonary - normal exam  Breath sounds clear to auscultation     Abdominal    Neurological - normal exam               Anesthesia Plan    ASA 3   ASA physical status 3 criteria: morbid obesity - BMI greater than or equal to 40    Plan - general       Airway plan will be LMA        Plan Factors:   Patient was not previously instructed to abstain from smoking on day of procedure.  Patient did not smoke on day of procedure.      Induction: intravenous    Postoperative Plan: Postoperative administration of opioids is intended.    Pertinent diagnostic labs and testing reviewed    Informed Consent:    Anesthetic plan and risks discussed with patient.    Use of blood products discussed with: patient whom consented to blood products.

## 2022-06-06 NOTE — OR NURSING
"1506: To PACU post left knee arthroscopy. Pt is extubated, breathing is spontaneous and unlabored. Strong DP pulse observed on operative extremity.    1519: Pt more awake. States \"very little pain\".    1539: Pt states pain is about 6 1/2. Declines pain medication.    1553: Pain is slightly less.    1608: Meets criteria for stage ll.  "

## 2022-06-06 NOTE — OR NURSING
Pt allergies and NPO status verified, home meds reconcilled. Belongings secured. Pt verbalizes understanding of the pain scale, expected course of stay, and plan of care.  Surgical site verified with pt.  IV access established.  Sequential/ shannan hose placed on R leg.

## 2022-06-07 NOTE — OR NURSING
1614: Pt admitted to phase II from PACU. Report received from Travis HERNADEZ.     1620: Pt dressed with help of CNA.     1630: Pt and family educated on discharge instructions. All questions answered, pt and family verbalize understanding.     1645: Pt discharged to home via wheelchair.

## 2022-06-28 ENCOUNTER — APPOINTMENT (OUTPATIENT)
Dept: MEDICAL GROUP | Facility: PHYSICIAN GROUP | Age: 57
End: 2022-06-28
Payer: COMMERCIAL

## 2022-07-01 DIAGNOSIS — F41.9 ANXIETY: ICD-10-CM

## 2022-07-05 RX ORDER — SERTRALINE HYDROCHLORIDE 100 MG/1
100 TABLET, FILM COATED ORAL DAILY
Qty: 40 TABLET | Refills: 0 | Status: SHIPPED | OUTPATIENT
Start: 2022-07-05 | End: 2022-08-01

## 2022-07-19 ENCOUNTER — APPOINTMENT (OUTPATIENT)
Dept: MEDICAL GROUP | Facility: PHYSICIAN GROUP | Age: 57
End: 2022-07-19
Payer: COMMERCIAL

## 2022-08-01 DIAGNOSIS — F41.9 ANXIETY: ICD-10-CM

## 2022-08-01 RX ORDER — SERTRALINE HYDROCHLORIDE 100 MG/1
TABLET, FILM COATED ORAL
Qty: 40 TABLET | Refills: 0 | Status: SHIPPED | OUTPATIENT
Start: 2022-08-01 | End: 2022-09-22 | Stop reason: SDUPTHER

## 2022-09-22 DIAGNOSIS — F41.9 ANXIETY: ICD-10-CM

## 2022-09-25 RX ORDER — SERTRALINE HYDROCHLORIDE 100 MG/1
100 TABLET, FILM COATED ORAL DAILY
Qty: 40 TABLET | Refills: 0 | Status: SHIPPED | OUTPATIENT
Start: 2022-09-25 | End: 2022-10-24 | Stop reason: SDUPTHER

## 2022-10-10 RX ORDER — LISINOPRIL 20 MG/1
20 TABLET ORAL DAILY
Qty: 30 TABLET | Refills: 1 | Status: SHIPPED | OUTPATIENT
Start: 2022-10-10 | End: 2022-11-16 | Stop reason: SDUPTHER

## 2022-10-18 NOTE — PROGRESS NOTES
Established Patient    Luann presents today with the following:    CC: Neck and shoulder pain.    HPI: Patient is a pleasant 53-year-old female here with acute complaint of neck and shoulder pain for the past 2 weeks.  Patient reports that the pain started suddenly without any precipitating factors or inciting events.  Patient reports pain in her neck and her left shoulder, stabbing in quality, nonradiating, exacerbated by physical activity, and without any relieving factors, associated with some light tingling in her hand.  Patient denies any focal neurologic signs.  Patient saw an urgent care physician at [place] who diagnosed her with muscle spasm, but did not prescribe any treatment at that time.  Patient has been taking ibuprofen high dose as well as high doses of Tylenol without improvement in symptoms.  Patient is also tried heat, ice, lidocaine patches, salon pas patches without improvement.  Never had similar symptoms in the past.  Patient is a side sleeper, but reports with sleeping on her right side.    Patient Active Problem List    Diagnosis Date Noted   • Arthralgia of right hand 10/19/2018   • Sinus pain 05/16/2018   • Iron deficiency anemia 05/16/2018   • Morbid obesity with BMI of 40.0-44.9, adult ([facility]) 01/04/2018   • Left wrist pain 08/02/2017   • Lunotriquetral ligament tear 08/02/2017   • Palpitations 07/26/2016   • Essential hypertension    • Postoperative hypothyroidism    • Large breasts    • Perimenopausal    • Unspecified disorder of menstruation and other abnormal bleeding from female genital tract 04/11/2014       Current Outpatient Prescriptions   Medication Sig Dispense Refill   • gabapentin (NEURONTIN) 100 MG Cap Take 1 Cap by mouth 3 times a day. 25 Cap 0   • cyclobenzaprine (FLEXERIL) 5 MG tablet Take 1 Tab by mouth 3 times a day as needed for Moderate Pain. 15 Tab 0   • lisinopril (PRINIVIL) 5 MG Tab Take 1 Tab by mouth every day. 30 Tab 0   • levothyroxine (SYNTHROID) 125  "MCG Tab Take 1 tablet Monday through Saturday, then 2 tablets on Sunday 90 Tab 3   • fexofenadine (ALLEGRA) 60 MG Tab Take 1 Tab by mouth every day. 15 Tab 0   • therapeutic multivitamin-minerals (THERAGRAN-M) Tab Take 1 Tab by mouth every day.     • Glucosamine-Chondroitin (MOVE FREE PO) Take 2 Tabs by mouth every morning.     • omeprazole (PRILOSEC) 20 MG CPDR Take 40 mg by mouth every morning.     • Ibuprofen 200 MG Cap Take 1 Cap by mouth 3 times a day. 20 Cap 0   • fluticasone (FLONASE) 50 MCG/ACT nasal spray Spray 2 Sprays in nose every day. 16 g 1   • Probiotic Product (PROBIOTIC DAILY PO) Take  by mouth.       No current facility-administered medications for this visit.        ROS: As per HPI. Additional pertinent systems as noted below.  Constitutional: Negative for chills and fever.   HENT: Negative for ear pain and sore throat.    Eyes: Negative for discharge and redness.   Respiratory: Negative for cough, hemoptysis, wheezing and stridor.    Cardiovascular: Negative for chest pain, palpitations and leg swelling.   Gastrointestinal: Negative for abdominal pain, constipation, diarrhea, heartburn, nausea and vomiting.   Genitourinary: Negative for dysuria, flank pain and hematuria.   Musculoskeletal: Negative for falls and myalgias.   Skin: Negative for itching and rash.   Neurological: Negative for dizziness, seizures, loss of consciousness and headaches.   Endo/Heme/Allergies: Negative for polydipsia. Does not bruise/bleed easily.   Psychiatric/Behavioral: Negative for substance abuse and suicidal ideas.       BP (!) 204/104 (BP Location: Right arm, Patient Position: Sitting, BP Cuff Size: Large adult)   Pulse 84   Temp 37.1 °C (98.8 °F) (Temporal)   Ht 1.626 m (5' 4\")   Wt 108.9 kg (240 lb)   LMP 03/30/2014   SpO2 97%   BMI 41.20 kg/m²     Physical Exam   Constitutional:  oriented to person, place, and time. No distress.   Eyes: Pupils are equal, round, and reactive to light. No scleral " icterus.  Neck: Neck supple. No thyromegaly present.   Cardiovascular: Normal rate, regular rhythm and normal heart sounds.  Exam reveals no gallop and no friction rub.  No murmur heard.  Pulmonary/Chest: Breath sounds normal. Chest wall is not dull to percussion.   Musculoskeletal:   no edema.   Lymphadenopathy: no cervical adenopathy  Neurological: alert and oriented to person, place, and time.   Skin: No cyanosis. Nails show no clubbing.  Other: Mildly decreased range of motion in the neck.  Some tenderness to palpation along the cervical spine and the paraspinal muscles on the left.  Positive Neer sign, positive Maldonado test, positive drop can sign.  Some tenderness to palpation along the posterior shoulder.    Note: I have reviewed all pertinent labs and diagnostic tests associated with this visit with specific comments listed under the assessment and plan below    Assessment and Plan    1. Impingement syndrome of left shoulder  2. Neck pain  Patient patient's presentation and symptoms she likely has some cervical radiculopathy as well as impingement syndrome of the left shoulder.  Patient would benefit from conservative measures.  Get cervical x-ray to ensure proper alignment of the neck.  Referral to PT provided, patient advised to start only after she does the cervical x-ray.  Patient referred to orthopedics for trigger point injections.  Start gabapentin 100 mg 3 times daily as needed.  As needed Flexeril.  Advised patient that if her symptoms worsen or are not improving that she can visit an orthopedic urgent care office if needed    3. Elevated BP without diagnosis of hypertension  Elevated blood pressure on several measurements today.  Patient asymptomatic.  Was taken off her blood pressure medications by her primary care practitioner due to having normal blood pressures.  This elevation is a likely attributed to use of NSAIDs as well as stress and pain.  Start lisinopril 5 mg  Advised patient to measure  her blood pressure at home.  Also advised patient that her blood pressure normalizes, then she does not need to continue taking this medication.      Followup: Return in about 2 weeks (around 2/15/2019), or if symptoms worsen or fail to improve.      Signed by: Yolis Blake M.D.   [facility] Pregnancy And Lactation Text: This medication is Pregnancy Category X and is absolutely contraindicated during pregnancy. It is unknown if it is excreted in breast milk.

## 2022-11-16 ENCOUNTER — OFFICE VISIT (OUTPATIENT)
Dept: MEDICAL GROUP | Facility: PHYSICIAN GROUP | Age: 57
End: 2022-11-16
Payer: COMMERCIAL

## 2022-11-16 VITALS
BODY MASS INDEX: 47.46 KG/M2 | RESPIRATION RATE: 18 BRPM | HEART RATE: 75 BPM | DIASTOLIC BLOOD PRESSURE: 70 MMHG | HEIGHT: 64 IN | WEIGHT: 278 LBS | OXYGEN SATURATION: 95 % | SYSTOLIC BLOOD PRESSURE: 108 MMHG | TEMPERATURE: 97.8 F

## 2022-11-16 DIAGNOSIS — R00.2 PALPITATIONS: ICD-10-CM

## 2022-11-16 DIAGNOSIS — R68.89 FLU-LIKE SYMPTOMS: ICD-10-CM

## 2022-11-16 DIAGNOSIS — H66.001 NON-RECURRENT ACUTE SUPPURATIVE OTITIS MEDIA OF RIGHT EAR WITHOUT SPONTANEOUS RUPTURE OF TYMPANIC MEMBRANE: ICD-10-CM

## 2022-11-16 DIAGNOSIS — K21.9 GASTROESOPHAGEAL REFLUX DISEASE WITHOUT ESOPHAGITIS: ICD-10-CM

## 2022-11-16 DIAGNOSIS — F41.9 ANXIETY: ICD-10-CM

## 2022-11-16 DIAGNOSIS — R05.1 ACUTE COUGH: ICD-10-CM

## 2022-11-16 DIAGNOSIS — I10 ESSENTIAL HYPERTENSION: ICD-10-CM

## 2022-11-16 DIAGNOSIS — E89.0 POSTOPERATIVE HYPOTHYROIDISM: ICD-10-CM

## 2022-11-16 PROBLEM — S83.289A TEAR OF LATERAL MENISCUS OF KNEE: Status: ACTIVE | Noted: 2022-05-20

## 2022-11-16 LAB
EXTERNAL QUALITY CONTROL: NORMAL
FLUAV+FLUBV AG SPEC QL IA: NEGATIVE
INT CON NEG: NORMAL
INT CON POS: NORMAL
S PYO AG THROAT QL: NEGATIVE
SARS-COV+SARS-COV-2 AG RESP QL IA.RAPID: NEGATIVE

## 2022-11-16 PROCEDURE — 87426 SARSCOV CORONAVIRUS AG IA: CPT | Performed by: PHYSICIAN ASSISTANT

## 2022-11-16 PROCEDURE — 99214 OFFICE O/P EST MOD 30 MIN: CPT | Performed by: PHYSICIAN ASSISTANT

## 2022-11-16 PROCEDURE — 87804 INFLUENZA ASSAY W/OPTIC: CPT | Performed by: PHYSICIAN ASSISTANT

## 2022-11-16 PROCEDURE — 87880 STREP A ASSAY W/OPTIC: CPT | Performed by: PHYSICIAN ASSISTANT

## 2022-11-16 RX ORDER — OMEPRAZOLE 20 MG/1
40 CAPSULE, DELAYED RELEASE ORAL EVERY MORNING
Qty: 90 CAPSULE | Refills: 1 | Status: SHIPPED | OUTPATIENT
Start: 2022-11-16

## 2022-11-16 RX ORDER — ATENOLOL 50 MG/1
TABLET ORAL
Qty: 135 TABLET | Refills: 0 | Status: SHIPPED | OUTPATIENT
Start: 2022-11-16

## 2022-11-16 RX ORDER — BENZONATATE 100 MG/1
100 CAPSULE ORAL 3 TIMES DAILY PRN
Qty: 30 CAPSULE | Refills: 0 | Status: SHIPPED | OUTPATIENT
Start: 2022-11-16 | End: 2022-11-29

## 2022-11-16 RX ORDER — LISINOPRIL 20 MG/1
20 TABLET ORAL DAILY
Qty: 90 TABLET | Refills: 1 | Status: SHIPPED | OUTPATIENT
Start: 2022-11-16

## 2022-11-16 RX ORDER — SERTRALINE HYDROCHLORIDE 100 MG/1
100 TABLET, FILM COATED ORAL DAILY
Qty: 90 TABLET | Refills: 1 | Status: SHIPPED | OUTPATIENT
Start: 2022-11-16

## 2022-11-16 RX ORDER — LEVOTHYROXINE SODIUM 0.12 MG/1
TABLET ORAL
Qty: 90 TABLET | Refills: 3 | Status: SHIPPED | OUTPATIENT
Start: 2022-11-16

## 2022-11-16 RX ORDER — AMOXICILLIN AND CLAVULANATE POTASSIUM 875; 125 MG/1; MG/1
1 TABLET, FILM COATED ORAL 2 TIMES DAILY
Qty: 20 TABLET | Refills: 0 | Status: SHIPPED | OUTPATIENT
Start: 2022-11-16 | End: 2022-12-14

## 2022-11-16 ASSESSMENT — FIBROSIS 4 INDEX: FIB4 SCORE: 1.23

## 2022-11-16 NOTE — PROGRESS NOTES
Chief Complaint   Patient presents with    Nasal Congestion     3-4 days    Pharyngitis     3-4 days    Cough     Mucus, causes rib to be sore. 3-4 days    Otalgia     R ear, today         HPI: Patient is a 57 y.o. female complaining of 4 days of illness including:  Nasal congestion, cough, sore throat, and ear pain.Mostly on the right side and just started today.  Also states that she is having some associated bilateral rib soreness from coughing.    Mucus is: thin.  Similarly ill exposures: no.  Treatments tried: nothing   She  reports that she quit smoking about 26 years ago. Her smoking use included cigarettes. She has a 2.50 pack-year smoking history. She has never used smokeless tobacco..     ROS:  No fever, cough, nausea, changes in bowel movements or skin rash.     I reviewed the patient's medications, allergies and medical history:  Current Outpatient Medications   Medication Sig Dispense Refill    atenolol (TENORMIN) 50 MG Tab TAKE 1 TABLET BY MOUTH IN THE MORNING AND 1/2 AT NIGHT 135 Tablet 0    sertraline (ZOLOFT) 100 MG Tab Take 1 Tablet by mouth every day. 90 Tablet 0    lisinopril (PRINIVIL) 20 MG Tab Take 1 Tablet by mouth every day. 30 Tablet 1    cyanocobalamin (VITAMIN B-12) 500 MCG Tab Take 2 Tablets by mouth every day.      Omeprazole Magnesium (PRILOSEC PO) Prilosec      acetaminophen (TYLENOL) 650 MG CR tablet Take 1 Tablet by mouth every day.      aspirin EC (ECOTRIN) 325 MG Tablet Delayed Response Take 1 Tablet by mouth every day.      levothyroxine (SYNTHROID) 125 MCG Tab Take 1 tablet Monday through Saturday, then 2 tablets on Sunday (Patient taking differently: Take 1 Tablet by mouth every morning on an empty stomach.) 90 Tab 3    omeprazole (PRILOSEC) 20 MG CPDR Take 2 Capsules by mouth every morning. Indications: Gastroesophageal Reflux Disease      oxyCODONE-acetaminophen (PERCOCET) 5-325 MG Tab Percocet 5 mg-325 mg tablet   Take 1 tablet every 4-6 hours by oral route as needed for 7  "days. (Patient not taking: Reported on 11/16/2022)      predniSONE (DELTASONE) 20 MG Tab prednisone 20 mg tablet   TAKE 1 TABLET BY MOUTH TWICE DAILY (Patient not taking: Reported on 11/16/2022)      Melatonin 10 MG Cap Take 1 Capsule by mouth at bedtime. (Patient not taking: Reported on 11/16/2022)      Glucosamine-Chondroitin (MOVE FREE PO) Take 1 Tablet by mouth every morning. (Patient not taking: Reported on 11/16/2022)       No current facility-administered medications for this visit.     Bactrim [sulfamethoxazole w-trimethoprim], Diclofenac, Doxycycline, Hydrocodone, Sulfamethizole, Tetracycline, Tramadol, Zpack [azithromycin], and Sulfamethoxazole  Past Medical History:   Diagnosis Date    Anesthesia     slow to wake up    Anxiety disorder     Arthritis      knees    Cancer (HCC) 2000    thyroid ca-thyroidectomy. Radiation tx. No chemo.    Chickenpox as child    Chronic nausea     Delayed emergence from general anesthesia     Dental disorder     upper/lower dentures    Edema 10/2021    to ankles bilateral. 12/27/21-continues, followed by PCP.    Elevated alkaline phosphatase level     Essential hypertension     Fatty liver     GERD (gastroesophageal reflux disease)     Heart burn     taking omeprazole    Hypothyroidism     Indigestion     taking omeprazole    Pain 07/28/2017    left wrist, 6/10    Palpitations     Rheumatic fever as child    Seasonal allergies     Sleep apnea     CPAP with Oxygen 2L @ night    Snoring     no sleep study    Unspecified disorder of thyroid 1999    thyroid cancer w/ radiation/surgery        EXAM:  /70   Pulse 75   Temp 36.6 °C (97.8 °F) (Temporal)   Resp 18   Ht 1.626 m (5' 4\")   Wt (!) 126 kg (278 lb)   SpO2 95%   General: Alert, no conversational dyspnea or audible wheeze, non-toxic appearance.  Eyes: PERRL, conjunctiva slightly injected, no eye discharge.  Ears: Normal pinnae,TM's erythematous and bulging on the right, left TM normal  Nares: Patent with thin " mucus.  Sinuses: non tender over maxillary / frontal sinuses.  Throat: Erythematous injection without exudate.   Neck: Supple, with no adenopathy.  Lungs: Clear to auscultation bilaterally, no wheeze, crackles or rhonchi.   Heart: Regular rate without murmur.  Skin: Warm and dry without rash.     ASSESSMENT:   1. Essential hypertension    2. Palpitations    3. Postoperative hypothyroidism    4. Anxiety       PLAN:  1. Educated patient that majority of upper respiratory infections are viral and do not need antibiotics.  Augmentin sent in for right AOM.  2. Twice daily use of nasal saline rinse or Neti-Pot.  3. OTC anti-pyretics and decongestants as needed.  4. Follow-up in office or urgent care for worsening symptoms, difficulty breathing, lack of expected recovery, or should new symptoms or problems arise.    1. Essential hypertension  Chronic, well controlled.  Blood pressure in office today is 108/70.  Refill of medication sent and no changes made today.  - atenolol (TENORMIN) 50 MG Tab; TAKE 1 TABLET BY MOUTH IN THE MORNING AND 1/2 AT NIGHT  Dispense: 135 Tablet; Refill: 0  - lisinopril (PRINIVIL) 20 MG Tab; Take 1 Tablet by mouth every day.  Dispense: 90 Tablet; Refill: 1    2. Palpitations   - atenolol (TENORMIN) 50 MG Tab; TAKE 1 TABLET BY MOUTH IN THE MORNING AND 1/2 AT NIGHT  Dispense: 135 Tablet; Refill: 0    3. Postoperative hypothyroidism  chronic.  Recent TSH this year was within range.  Refill sent in  - levothyroxine (SYNTHROID) 125 MCG Tab; Take 1 tablet Monday through Saturday, then 2 tablets on Sunday  Dispense: 90 Tablet; Refill: 3    4. Anxiety  Chronic, well controlled for the most part.  Patient's  recently passed away last week and she has been struggling with this, however overall feels stable on Zoloft.  Did recommend notify me of any new or worsening symptoms or if she feels like she is not coping well.  - sertraline (ZOLOFT) 100 MG Tab; Take 1 Tablet by mouth every day.  Dispense: 90  Tablet; Refill: 1    5. Flu-like symptoms  - POCT SARS-COV Antigen SHEMAR (Symptomatic Only)  - POCT Influenza A/B  - POCT Rapid Strep A    6. Gastroesophageal reflux disease without esophagitis  - omeprazole (PRILOSEC) 20 MG delayed-release capsule; Take 2 Capsules by mouth every morning. Indications: Gastroesophageal Reflux Disease  Dispense: 90 Capsule; Refill: 1    7. Acute cough  - benzonatate (TESSALON) 100 MG Cap; Take 1 Capsule by mouth 3 times a day as needed for Cough.  Dispense: 30 Capsule; Refill: 0    8. Non-recurrent acute suppurative otitis media of right ear without spontaneous rupture of tympanic membrane  - amoxicillin-clavulanate (AUGMENTIN) 875-125 MG Tab; Take 1 Tablet by mouth 2 times a day.  Dispense: 20 Tablet; Refill: 0

## 2022-11-29 ENCOUNTER — OFFICE VISIT (OUTPATIENT)
Dept: MEDICAL GROUP | Facility: PHYSICIAN GROUP | Age: 57
End: 2022-11-29
Payer: COMMERCIAL

## 2022-11-29 VITALS
RESPIRATION RATE: 18 BRPM | TEMPERATURE: 97.7 F | SYSTOLIC BLOOD PRESSURE: 122 MMHG | HEIGHT: 64 IN | BODY MASS INDEX: 47.63 KG/M2 | HEART RATE: 76 BPM | OXYGEN SATURATION: 95 % | DIASTOLIC BLOOD PRESSURE: 66 MMHG | WEIGHT: 279 LBS

## 2022-11-29 DIAGNOSIS — J30.1 SEASONAL ALLERGIC RHINITIS DUE TO POLLEN: ICD-10-CM

## 2022-11-29 DIAGNOSIS — Z23 NEED FOR VACCINATION: ICD-10-CM

## 2022-11-29 DIAGNOSIS — R41.3 MEMORY CHANGE: ICD-10-CM

## 2022-11-29 DIAGNOSIS — F43.21 GRIEF REACTION: ICD-10-CM

## 2022-11-29 PROCEDURE — 96127 BRIEF EMOTIONAL/BEHAV ASSMT: CPT | Performed by: PHYSICIAN ASSISTANT

## 2022-11-29 PROCEDURE — 99214 OFFICE O/P EST MOD 30 MIN: CPT | Mod: 25 | Performed by: PHYSICIAN ASSISTANT

## 2022-11-29 PROCEDURE — 90471 IMMUNIZATION ADMIN: CPT | Performed by: PHYSICIAN ASSISTANT

## 2022-11-29 PROCEDURE — 90686 IIV4 VACC NO PRSV 0.5 ML IM: CPT | Performed by: PHYSICIAN ASSISTANT

## 2022-11-29 RX ORDER — TRAZODONE HYDROCHLORIDE 50 MG/1
50 TABLET ORAL NIGHTLY
Qty: 30 TABLET | Refills: 3 | Status: SHIPPED | OUTPATIENT
Start: 2022-11-29

## 2022-11-29 ASSESSMENT — ANXIETY QUESTIONNAIRES
2. NOT BEING ABLE TO STOP OR CONTROL WORRYING: SEVERAL DAYS
4. TROUBLE RELAXING: MORE THAN HALF THE DAYS
7. FEELING AFRAID AS IF SOMETHING AWFUL MIGHT HAPPEN: MORE THAN HALF THE DAYS
1. FEELING NERVOUS, ANXIOUS, OR ON EDGE: MORE THAN HALF THE DAYS
3. WORRYING TOO MUCH ABOUT DIFFERENT THINGS: SEVERAL DAYS
GAD7 TOTAL SCORE: 11
6. BECOMING EASILY ANNOYED OR IRRITABLE: SEVERAL DAYS
5. BEING SO RESTLESS THAT IT IS HARD TO SIT STILL: MORE THAN HALF THE DAYS

## 2022-11-29 ASSESSMENT — PATIENT HEALTH QUESTIONNAIRE - PHQ9
5. POOR APPETITE OR OVEREATING: 2 - MORE THAN HALF THE DAYS
CLINICAL INTERPRETATION OF PHQ2 SCORE: 4
SUM OF ALL RESPONSES TO PHQ QUESTIONS 1-9: 15

## 2022-11-29 ASSESSMENT — FIBROSIS 4 INDEX: FIB4 SCORE: 1.23

## 2022-11-29 NOTE — PROGRESS NOTES
Subjective:     CC: stress, memory change    HPI:   Luann presents today with multiple concerns.    Firstly she states that she is still experiencing some congestion even after finishing her antibiotics.    Patient also has been dealing with a significant amount of stress and sadness since her  recently passed away.    Patient also has concerns for some short-term memory loss and is concerned because she has a family history of Alzheimer's.      Past Medical History:   Diagnosis Date    Anesthesia     slow to wake up    Anxiety disorder     Arthritis      knees    Cancer (HCC)     thyroid ca-thyroidectomy. Radiation tx. No chemo.    Chickenpox as child    Chronic nausea     Delayed emergence from general anesthesia     Dental disorder     upper/lower dentures    Edema 10/2021    to ankles bilateral. 21-continues, followed by PCP.    Elevated alkaline phosphatase level     Essential hypertension     Fatty liver     GERD (gastroesophageal reflux disease)     Heart burn     taking omeprazole    Hypothyroidism     Indigestion     taking omeprazole    Pain 2017    left wrist, 6/10    Palpitations     Rheumatic fever as child    Seasonal allergies     Sleep apnea     CPAP with Oxygen 2L @ night    Snoring     no sleep study    Unspecified disorder of thyroid     thyroid cancer w/ radiation/surgery       Social History     Tobacco Use    Smoking status: Former     Packs/day: 0.50     Years: 5.00     Pack years: 2.50     Types: Cigarettes     Quit date: 1996     Years since quittin.9    Smokeless tobacco: Never    Tobacco comments:        Vaping Use    Vaping Use: Never used   Substance Use Topics    Alcohol use: Yes     Comment: Maybe one glass wine very other month    Drug use: Not Currently       Current Outpatient Medications Ordered in Epic   Medication Sig Dispense Refill    traZODone (DESYREL) 50 MG Tab Take 1 Tablet by mouth every evening. 30 Tablet 3    atenolol (TENORMIN)  "50 MG Tab TAKE 1 TABLET BY MOUTH IN THE MORNING AND 1/2 AT NIGHT 135 Tablet 0    levothyroxine (SYNTHROID) 125 MCG Tab Take 1 tablet Monday through Saturday, then 2 tablets on Sunday 90 Tablet 3    lisinopril (PRINIVIL) 20 MG Tab Take 1 Tablet by mouth every day. 90 Tablet 1    sertraline (ZOLOFT) 100 MG Tab Take 1 Tablet by mouth every day. 90 Tablet 1    omeprazole (PRILOSEC) 20 MG delayed-release capsule Take 2 Capsules by mouth every morning. Indications: Gastroesophageal Reflux Disease 90 Capsule 1    acetaminophen (TYLENOL) 650 MG CR tablet Take 1 Tablet by mouth every day.      aspirin EC (ECOTRIN) 325 MG Tablet Delayed Response Take 1 Tablet by mouth every day.      amoxicillin-clavulanate (AUGMENTIN) 875-125 MG Tab Take 1 Tablet by mouth 2 times a day. (Patient not taking: Reported on 11/29/2022) 20 Tablet 0     No current Epic-ordered facility-administered medications on file.       Allergies:  Bactrim [sulfamethoxazole w-trimethoprim], Diclofenac, Doxycycline, Hydrocodone, Sulfamethizole, Tetracycline, Tramadol, Zpack [azithromycin], and Sulfamethoxazole    Health Maintenance: Completed    ROS:  Gen: no fevers/chills  Eyes: no changes in vision  ENT: no sore throat  Pulm: no sob, no cough  CV: no chest pain  GI: no nausea/vomiting  : no dysuria  Skin: no rash  Neuro: no headaches, positive for memory change  Psych: Positive for depression    Objective:       Exam:  /66   Pulse 76   Temp 36.5 °C (97.7 °F) (Temporal)   Resp 18   Ht 1.626 m (5' 4\")   Wt (!) 127 kg (279 lb)   LMP 03/30/2014   SpO2 95%   BMI 47.89 kg/m²  Body mass index is 47.89 kg/m².    Gen: Alert and oriented, No apparent distress.  Skin: Warm, dry, good turgor, no rashes in visible areas.  HEENT: Normocephalic. Eyes conjunctiva clear lids without ptosis, pupils equal and reactive to light accommodation, ears normal shape and contour, tympanic membranes benign bilaterally  Neck: Trachea midline, no masses, no " thyromegaly  Lungs: Normal effort, CTA bilaterally, no wheezes, rhonchi, or rales  CV: Regular rate and rhythm. No murmurs, rubs, or gallops.  MSK: Normal gait, moves all extremities.  Neuro: Grossly non-focal.  Ext: No clubbing, cyanosis, edema.  Psych: Alert and oriented x3, normal affect and mood.     Depression Screening    Little interest or pleasure in doing things?  2 - more than half the days   Feeling down, depressed , or hopeless? 2 - more than half the days   Trouble falling or staying asleep, or sleeping too much?  3 - nearly every day   Feeling tired or having little energy?  2 - more than half the days   Poor appetite or overeating?  2 - more than half the days   Feeling bad about yourself - or that you are a failure or have let yourself or your family down? 2 - more than half the days   Trouble concentrating on things, such as reading the newspaper or watching television? 2 - more than half the days   Moving or speaking so slowly that other people could have noticed.  Or the opposite - being so fidgety or restless that you have been moving around a lot more than usual?  0 - not at all   Thoughts that you would be better off dead, or of hurting yourself?  0 - not at all   Patient Health Questionnaire Score: 15       If depressive symptoms identified deferred to follow up visit unless specifically addressed in assesment and plan.    Interpretation of PHQ-9 Total Score   Score Severity   1-4 No Depression   5-9 Mild Depression   10-14 Moderate Depression   15-19 Moderately Severe Depression   20-27 Severe Depression    DEVI-7 Questionnaire    Feeling nervous, anxious, or on edge: More than half the days  Not being able to sop or control worrying: Several days  Worrying too much about different things: Several days  Trouble relaxing: More than half the days  Being so restless that it's hard to sit still: More than half the days  Becoming easily annoyed or irritable: Several days  Feeling afraid as if  something awful might happen: More than half the days  Total: 11    Interpretation of DEVI 7 Total Score   Score Severity :  0-4 No Anxiety   5-9 Mild Anxiety  10-14 Moderate Anxiety  15-21 Severe Anxiety    Assessment & Plan:     57 y.o. female with the following -     1. Grief reaction  Acute.  Patient's  recently passed away a few weeks ago.  States that she has been struggling significantly, particularly with the holiday season.  Patient is already on sertraline 100 mg.  Discussed normal grief reaction.  Prescription for trazodone sent in to help with insomnia.  Educate about side effects of medication.  Also recommended the patient get set up with a grief counselor to further discuss what she is feeling.  Denies SI.  - traZODone (DESYREL) 50 MG Tab; Take 1 Tablet by mouth every evening.  Dispense: 30 Tablet; Refill: 3    2. Memory change  Acute.  MMSE score of 29 out of 30.  Discussed normal age-related memory changes versus concern for dementia.  Also discussed possible referral for memory clinic through neurology, however patient states she is can to lose her insurance tomorrow and does not want to pursue this at this time.  Notify me if any new or worsening changes.    3. Seasonal allergic rhinitis due to pollen  Reported history and exam findings are most consistent with seasonal allergies and PND. Recommended daily sinus rinses as well as Flonase once or twice daily. Patient is instructed on how to correctly use Flonase to avoid epistaxis. Patient can also try Zyrtec or Claritin as needed.      4. Need for vaccination  - INFLUENZA VACCINE QUAD INJ (PF)      I spent a total of 37 minutes with record review (including external notes and labs), exam, communication with the patient, communication with other providers, and documentation of this encounter.     Return in about 4 weeks (around 12/27/2022), or if symptoms worsen or fail to improve.    Please note that this dictation was created using voice  recognition software. I have made every reasonable attempt to correct obvious errors, but I expect that there are errors of grammar and possibly content that I did not discover before finalizing the note.    Electronically signed by Sarita Moran PA-C on November 29, 2022

## 2022-12-14 ENCOUNTER — OFFICE VISIT (OUTPATIENT)
Dept: MEDICAL GROUP | Facility: PHYSICIAN GROUP | Age: 57
End: 2022-12-14
Payer: MEDICAID

## 2022-12-14 ENCOUNTER — HOSPITAL ENCOUNTER (OUTPATIENT)
Facility: MEDICAL CENTER | Age: 57
End: 2022-12-14
Attending: PHYSICIAN ASSISTANT
Payer: MEDICAID

## 2022-12-14 VITALS
BODY MASS INDEX: 47.8 KG/M2 | HEIGHT: 64 IN | DIASTOLIC BLOOD PRESSURE: 70 MMHG | TEMPERATURE: 97.8 F | SYSTOLIC BLOOD PRESSURE: 116 MMHG | RESPIRATION RATE: 18 BRPM | WEIGHT: 280 LBS | OXYGEN SATURATION: 95 % | HEART RATE: 63 BPM

## 2022-12-14 DIAGNOSIS — R39.15 URINARY URGENCY: ICD-10-CM

## 2022-12-14 DIAGNOSIS — M25.562 ACUTE PAIN OF LEFT KNEE: ICD-10-CM

## 2022-12-14 LAB
APPEARANCE UR: CLEAR
BILIRUB UR STRIP-MCNC: NEGATIVE MG/DL
COLOR UR AUTO: YELLOW
GLUCOSE UR STRIP.AUTO-MCNC: NEGATIVE MG/DL
KETONES UR STRIP.AUTO-MCNC: NEGATIVE MG/DL
LEUKOCYTE ESTERASE UR QL STRIP.AUTO: NORMAL
NITRITE UR QL STRIP.AUTO: NEGATIVE
PH UR STRIP.AUTO: 7 [PH] (ref 5–8)
PROT UR QL STRIP: NEGATIVE MG/DL
RBC UR QL AUTO: NEGATIVE
SP GR UR STRIP.AUTO: 1.01
UROBILINOGEN UR STRIP-MCNC: 0.2 MG/DL

## 2022-12-14 PROCEDURE — 87086 URINE CULTURE/COLONY COUNT: CPT

## 2022-12-14 PROCEDURE — 81002 URINALYSIS NONAUTO W/O SCOPE: CPT | Performed by: PHYSICIAN ASSISTANT

## 2022-12-14 PROCEDURE — 99214 OFFICE O/P EST MOD 30 MIN: CPT | Mod: 25 | Performed by: PHYSICIAN ASSISTANT

## 2022-12-14 RX ORDER — OXYBUTYNIN CHLORIDE 5 MG/1
5 TABLET, EXTENDED RELEASE ORAL DAILY
Qty: 30 TABLET | Refills: 3 | Status: SHIPPED | OUTPATIENT
Start: 2022-12-14

## 2022-12-14 ASSESSMENT — FIBROSIS 4 INDEX: FIB4 SCORE: 1.23

## 2022-12-14 NOTE — PROGRESS NOTES
Subjective:     CC: Fall, dysuria    HPI:   Luann presents today with concerns for urinary urgency for the last 3 weeks. No dysuria. Also some urge incontinence but not stress incontinence.    Patient also states that a week ago she slipped on black ice.  When she fell she did the splits and then fell backwards onto her butt. During the process she hurt her left knee.  Patient previously had surgery on this knee for meniscus tear and acl tear.  Also states that both of her hips have been hurting as well as her inner thighs.  She did get some bruising on her left inner thigh.    Patient also states that she has been experiencing enlarged lymph node on the right side of her throat.  Was hurting this morning but it resolved.    Past Medical History:   Diagnosis Date    Anesthesia     slow to wake up    Anxiety disorder     Arthritis      knees    Cancer (HCC)     thyroid ca-thyroidectomy. Radiation tx. No chemo.    Chickenpox as child    Chronic nausea     Delayed emergence from general anesthesia     Dental disorder     upper/lower dentures    Edema 10/2021    to ankles bilateral. 21-continues, followed by PCP.    Elevated alkaline phosphatase level     Essential hypertension     Fatty liver     GERD (gastroesophageal reflux disease)     Heart burn     taking omeprazole    Hypothyroidism     Indigestion     taking omeprazole    Pain 2017    left wrist, 6/10    Palpitations     Rheumatic fever as child    Seasonal allergies     Sleep apnea     CPAP with Oxygen 2L @ night    Snoring     no sleep study    Unspecified disorder of thyroid     thyroid cancer w/ radiation/surgery       Social History     Tobacco Use    Smoking status: Former     Packs/day: 0.50     Years: 5.00     Pack years: 2.50     Types: Cigarettes     Quit date: 1996     Years since quittin.9    Smokeless tobacco: Never    Tobacco comments:        Vaping Use    Vaping Use: Never used   Substance Use Topics    Alcohol  "use: Yes     Comment: Maybe one glass wine very other month    Drug use: Not Currently       Current Outpatient Medications Ordered in Epic   Medication Sig Dispense Refill    oxybutynin SR (DITROPAN-XL) 5 MG TABLET SR 24 HR Take 1 Tablet by mouth every day. 30 Tablet 3    traZODone (DESYREL) 50 MG Tab Take 1 Tablet by mouth every evening. 30 Tablet 3    atenolol (TENORMIN) 50 MG Tab TAKE 1 TABLET BY MOUTH IN THE MORNING AND 1/2 AT NIGHT 135 Tablet 0    levothyroxine (SYNTHROID) 125 MCG Tab Take 1 tablet Monday through Saturday, then 2 tablets on Sunday 90 Tablet 3    lisinopril (PRINIVIL) 20 MG Tab Take 1 Tablet by mouth every day. 90 Tablet 1    sertraline (ZOLOFT) 100 MG Tab Take 1 Tablet by mouth every day. 90 Tablet 1    omeprazole (PRILOSEC) 20 MG delayed-release capsule Take 2 Capsules by mouth every morning. Indications: Gastroesophageal Reflux Disease 90 Capsule 1    acetaminophen (TYLENOL) 650 MG CR tablet Take 1 Tablet by mouth every day.      aspirin EC (ECOTRIN) 325 MG Tablet Delayed Response Take 1 Tablet by mouth every day.       No current [facility]-ordered facility-administered medications on file.       Allergies:  Bactrim [sulfamethoxazole w-trimethoprim], Diclofenac, Doxycycline, Hydrocodone, Sulfamethizole, Tetracycline, Tramadol, Zpack [azithromycin], and Sulfamethoxazole    Health Maintenance: Completed    ROS:  Gen: no fevers/chills  Eyes: no changes in vision  ENT: no sore throat  Pulm: no sob, no cough  CV: no chest pain  GI: no nausea/vomiting  : Positive for urinary urgency  MSk: Positive for knee pain  Skin: no rash  Neuro: no headaches  Psych: no depression, no anxiety    Objective:     Exam:  /70   Pulse 63   Temp 36.6 °C (97.8 °F) (Temporal)   Resp 18   Ht 1.626 m (5' 4\")   Wt (!) 127 kg (280 lb)   LMP 03/30/2014   SpO2 95%   BMI 48.06 kg/m²  Body mass index is 48.06 kg/m².    Gen: Alert and oriented, No apparent distress.  Skin: Warm, dry, good turgor, no rashes in visible " areas.  HEENT: Normocephalic. Eyes conjunctiva clear lids without ptosis, pupils equal and reactive to light accommodation, ears normal shape and contour  Neck: Trachea midline, no masses, no thyromegaly  Lungs: Normal effort, CTA bilaterally, no wheezes, rhonchi, or rales  CV: Regular rate and rhythm. No murmurs, rubs, or gallops.  MSK: Normal gait, moves all extremities.  Knee: Decreased range of motion of left knee, decreased strength, TTP over lateral aspect of left knee, edema minimal, varus/valgus stress positive, anterior/posterior drawer negative  Abdomen: bowel sounds present, soft, nondistended, nontender throughout, no peritoneal signs, no CVA tenderness bilaterally, no hepatosplenomegaly  Neuro: Grossly non-focal.  Ext: No clubbing, cyanosis, edema.  Psych: Alert and oriented x3, normal affect and mood.     Labs: POCT UA:  Component      Latest Ref Rng 12/14/2022  10:50 AM   POC Color      Negative  Yellow    POC Appearance      Negative  Clear    POC Leukocyte Esterase      Negative  Moderate    POC Nitrites      Negative  Negative    POC Urobiligen      Negative (0.2) mg/dL 0.2    POC Protein      Negative mg/dL Negative    POC Urine PH      5.0 - 8.0  7.0    POC Blood      Negative  Negative    POC Specific Gravity      <1.005 - >1.030  1.015    POC Ketones      Negative mg/dL Negative    POC Bilirubin      Negative mg/dL Negative    POC Glucose      Negative mg/dL Negative      Assessment & Plan:     57 y.o. female with the following -     1. Urinary urgency  Acute on chronic.  Abnormal POCT UA in the office so urine culture sent.  Suspect contamination though so we will plan to proceed with treatment of OAB.  Educated about side effects of the medication as well as how to take it.  Also discussed lifestyle modifications to help with urinary urgency as well.  Follow-up in 3 months for reevaluation or sooner if needed.  - POCT Urinalysis  - oxybutynin SR (DITROPAN-XL) 5 MG TABLET SR 24 HR; Take 1  Tablet by mouth every day.  Dispense: 30 Tablet; Refill: 3  - URINE CULTURE(NEW); Future    2. Acute pain of left knee  Acute.  Patient previously had a meniscus repair and ACL repair on her left knee.  States she fell on the ice last week.  Patient has significant tenderness to palpation and weakness of her left knee so MRI ordered for further evaluation.  Patient was previously seen at [facility] orthopedics and would like to go back there if necessary.  Also extensively discussed the importance of RICE.   - MR-KNEE-W/O LEFT; Future    I spent a total of 32 minutes with record review (including external notes and labs), exam, communication with the patient, communication with other providers, and documentation of this encounter.     Return in about 3 months (around 3/14/2023) for f/u imaging.    Please note that this dictation was created using voice recognition software. I have made every reasonable attempt to correct obvious errors, but I expect that there are errors of grammar and possibly content that I did not discover before finalizing the note.    Electronically signed by Sarita Moran PA-C on December 14, 2022

## 2022-12-17 LAB
BACTERIA UR CULT: NORMAL
SIGNIFICANT IND 70042: NORMAL
SITE SITE: NORMAL
SOURCE SOURCE: NORMAL

## 2022-12-21 DIAGNOSIS — F40.240 CLAUSTROPHOBIA: ICD-10-CM

## 2022-12-21 RX ORDER — DIAZEPAM 5 MG/1
5 TABLET ORAL EVERY 6 HOURS PRN
Qty: 1 TABLET | Refills: 0 | Status: SHIPPED | OUTPATIENT
Start: 2022-12-21 | End: 2022-12-22

## 2022-12-22 NOTE — PROGRESS NOTES
Patient is requiring sedation for her upcoming MRI for claustrophobia.  PDMP reviewed and no inconsistencies.

## 2024-03-08 ENCOUNTER — HOSPITAL ENCOUNTER (OUTPATIENT)
Dept: RADIOLOGY | Facility: MEDICAL CENTER | Age: 59
End: 2024-03-08
Attending: ORTHOPAEDIC SURGERY
Payer: MEDICAID

## 2024-03-08 DIAGNOSIS — S90.31XA CONTUSION OF RIGHT FOOT, INITIAL ENCOUNTER: ICD-10-CM

## 2024-03-08 PROCEDURE — 73718 MRI LOWER EXTREMITY W/O DYE: CPT | Mod: RT

## 2024-12-02 ENCOUNTER — OFFICE VISIT (OUTPATIENT)
Dept: MEDICAL GROUP | Facility: PHYSICIAN GROUP | Age: 59
End: 2024-12-02
Payer: MEDICARE

## 2024-12-02 VITALS
BODY MASS INDEX: 45.14 KG/M2 | RESPIRATION RATE: 20 BRPM | OXYGEN SATURATION: 95 % | WEIGHT: 264.38 LBS | SYSTOLIC BLOOD PRESSURE: 124 MMHG | HEIGHT: 64 IN | HEART RATE: 56 BPM | TEMPERATURE: 97.6 F | DIASTOLIC BLOOD PRESSURE: 78 MMHG

## 2024-12-02 DIAGNOSIS — Z12.31 ENCOUNTER FOR SCREENING MAMMOGRAM FOR MALIGNANT NEOPLASM OF BREAST: ICD-10-CM

## 2024-12-02 DIAGNOSIS — I10 ESSENTIAL HYPERTENSION: Chronic | ICD-10-CM

## 2024-12-02 DIAGNOSIS — M15.0 PRIMARY OSTEOARTHRITIS INVOLVING MULTIPLE JOINTS: ICD-10-CM

## 2024-12-02 DIAGNOSIS — E66.01 SEVERE OBESITY (HCC): ICD-10-CM

## 2024-12-02 DIAGNOSIS — F41.9 ANXIETY: ICD-10-CM

## 2024-12-02 DIAGNOSIS — Z76.89 ENCOUNTER TO ESTABLISH CARE WITH NEW DOCTOR: ICD-10-CM

## 2024-12-02 DIAGNOSIS — G47.33 OSA (OBSTRUCTIVE SLEEP APNEA): ICD-10-CM

## 2024-12-02 DIAGNOSIS — E89.0 POSTOPERATIVE HYPOTHYROIDISM: ICD-10-CM

## 2024-12-02 DIAGNOSIS — Z00.00 ADULT GENERAL MEDICAL EXAM: ICD-10-CM

## 2024-12-02 PROBLEM — N89.8 VAGINAL DRYNESS: Status: RESOLVED | Noted: 2022-03-22 | Resolved: 2024-12-02

## 2024-12-02 PROBLEM — J34.89 SINUS PAIN: Status: RESOLVED | Noted: 2018-05-16 | Resolved: 2024-12-02

## 2024-12-02 PROBLEM — D50.9 IRON DEFICIENCY ANEMIA: Status: RESOLVED | Noted: 2018-05-16 | Resolved: 2024-12-02

## 2024-12-02 PROBLEM — M25.562 ACUTE PAIN OF LEFT KNEE: Status: RESOLVED | Noted: 2022-05-02 | Resolved: 2024-12-02

## 2024-12-02 PROBLEM — R05.9 COUGH: Status: RESOLVED | Noted: 2022-03-22 | Resolved: 2024-12-02

## 2024-12-02 PROCEDURE — 99214 OFFICE O/P EST MOD 30 MIN: CPT | Performed by: FAMILY MEDICINE

## 2024-12-02 RX ORDER — LEVOTHYROXINE SODIUM 137 UG/1
TABLET ORAL
Qty: 100 TABLET | Refills: 3 | Status: SHIPPED | OUTPATIENT
Start: 2024-12-02

## 2024-12-02 RX ORDER — MIRTAZAPINE 15 MG/1
15 TABLET, FILM COATED ORAL NIGHTLY
Qty: 100 TABLET | Refills: 3 | Status: SHIPPED | OUTPATIENT
Start: 2024-12-02 | End: 2024-12-19

## 2024-12-02 ASSESSMENT — PATIENT HEALTH QUESTIONNAIRE - PHQ9: CLINICAL INTERPRETATION OF PHQ2 SCORE: 0

## 2024-12-02 NOTE — ASSESSMENT & PLAN NOTE
This is a chronic problem.  She states she is having difficulty often going to sleep at night.  She states she tosses and turns before she finally goes to sleep.  She has tried trazodone in the past but was not successful.  Ambien gave her nightmares.  She is currently on sertraline.

## 2024-12-02 NOTE — ASSESSMENT & PLAN NOTE
Patient is here to establish care.  She has not been seen in about 2 years.  She recently got [facility] and is here for evaluation.  The last 2 years she has been going to [facility] for evaluation.  She states she is due for labs and like to get those done.  She also needs some referrals.  She states her Pap smear is current as it was done 2 to 3 years ago and normal.  She has had a lot of orthopedic issues over the last year.

## 2024-12-02 NOTE — PROGRESS NOTES
Subjective:     CC: Here to establish care and discuss several issues.    HPI:   Luann presents today with the following medical concern:    Encounter to establish care with new doctor  Patient is here to establish care.  She has not been seen in about 2 years.  She recently got [facility] and is here for evaluation.  The last 2 years she has been going to [facility] for evaluation.  She states she is due for labs and like to get those done.  She also needs some referrals.  She states her Pap smear is current as it was done 2 to 3 years ago and normal.  She has had a lot of orthopedic issues over the last year.    Postoperative hypothyroidism  This is a chronic problem.  She is due for TSH and that will be ordered.  Medication will also be renewed.    ILYA (obstructive sleep apnea)  This is a chronic problem.  She states she has not used her CPAP machine for the last year as the company she was getting it through apparently went out of business.  She needs a referral to a new sleep medicine doctor.    Essential hypertension  This is a chronic stable condition.  Blood pressure is well-controlled on current medications.    Anxiety  This is a chronic problem.  She states she is having difficulty often going to sleep at night.  She states she tosses and turns before she finally goes to sleep.  She has tried trazodone in the past but was not successful.  Ambien gave her nightmares.  She is currently on sertraline.    Primary osteoarthritis involving multiple joints  This is a chronic problem.  She has arthritic symptoms in multiple joints.  The most prevalent one at the present time is her right knee.  She was told is pretty much bone-on-bone.  She states Tylenol helps somewhat as well as Aleve.  She has had injections that were not very successful.  She thinks is progressing would like to see an orthopedist for further evaluation.  She is seen [facility] but [facility].    Past Medical History:   Diagnosis Date    Anesthesia     slow to wake up    Anxiety disorder     Arthritis      knees    Cancer (HCC)     thyroid ca-thyroidectomy. Radiation tx. No chemo.    Chickenpox as child    Chronic nausea     Delayed emergence from general anesthesia     Dental disorder     upper/lower dentures    Edema 10/2021    to ankles bilateral. 21-continues, followed by PCP.    Elevated alkaline phosphatase level     Essential hypertension     Fatty liver     GERD (gastroesophageal reflux disease)     Heart burn     taking omeprazole    Hypothyroidism     Indigestion     taking omeprazole    Pain 2017    left wrist, 6/10    Palpitations     Rheumatic fever as child    Seasonal allergies     Sleep apnea     CPAP with Oxygen 2L @ night    Snoring     no sleep study    Unspecified disorder of thyroid     thyroid cancer w/ radiation/surgery       Social History     Tobacco Use    Smoking status: Former     Current packs/day: 0.00     Average packs/day: 0.5 packs/day for 5.0 years (2.5 ttl pk-yrs)     Types: Cigarettes     Start date: 1991     Quit date: 1996     Years since quittin.9    Smokeless tobacco: Never    Tobacco comments:        Vaping Use    Vaping status: Never Used   Substance Use Topics    Alcohol use: Yes     Comment: Maybe one glass wine very other month    Drug use: Not Currently       Current Outpatient Medications Ordered in Epic   Medication Sig Dispense Refill    levothyroxine (SYNTHROID) 137 MCG Tab Take 1 tablet Monday through Saturday, then 2 tablets on  100 Tablet 3    mirtazapine (REMERON) 15 MG Tab Take 1 Tablet by mouth every evening. 100 Tablet 3    oxybutynin SR (DITROPAN-XL) 5 MG TABLET SR 24 HR Take 1 Tablet by mouth every day. 30 Tablet 3    atenolol (TENORMIN) 50 MG Tab TAKE 1 TABLET BY MOUTH IN THE MORNING AND 1/2 AT NIGHT 135 Tablet 0    lisinopril (PRINIVIL) 20 MG Tab Take 1 Tablet by mouth every  "day. 90 Tablet 1    omeprazole (PRILOSEC) 20 MG delayed-release capsule Take 2 Capsules by mouth every morning. Indications: Gastroesophageal Reflux Disease 90 Capsule 1    acetaminophen (TYLENOL) 650 MG CR tablet Take 1 Tablet by mouth every day.      aspirin EC (ECOTRIN) 325 MG Tablet Delayed Response Take 1 Tablet by mouth every day.       No current [facility]-ordered facility-administered medications on file.       Allergies:  Bactrim [sulfamethoxazole w-trimethoprim], Diclofenac, Doxycycline, Hydrocodone, Sulfamethizole, Tetracycline, Tramadol, Zpack [azithromycin], and Sulfamethoxazole    Health Maintenance: Completed    ROS:  Gen: no fevers/chills, no changes in weight  Eyes: no changes in vision  ENT: no sore throat, no hearing loss, no bloody nose  Pulm: no sob, no cough  CV: no chest pain, no palpitations  GI: no nausea/vomiting, no diarrhea  : no dysuria  MSk: no myalgias  Skin: no rash  Neuro: no headaches, no numbness/tingling  Heme/Lymph: no easy bruising      Objective:       Exam:  /78 (BP Location: Left arm, Patient Position: Sitting, BP Cuff Size: Adult)   Pulse (!) 56   Temp 36.4 °C (97.6 °F) (Temporal)   Resp 20   Ht 1.626 m (5' 4\")   Wt 120 kg (264 lb 6 oz)   LMP 03/30/2014   SpO2 95%   BMI 45.38 kg/m²  Body mass index is 45.38 kg/m².    Gen: Alert and oriented, No apparent distress.  Eyes:   Extraocular motions intact.  No scleral icterus seen.  Ears:    Ear canals and TMs are clear.  Neck: Neck is supple without lymphadenopathy.  Lungs: Normal effort, CTA bilaterally, no wheezes, rhonchi, or rales  CV: Regular rate and rhythm. No murmurs, rubs, or gallops.  No carotid bruits heard.  Abdomen: Soft, nontender, and organomegaly or masses.  Ext: No clubbing, cyanosis, edema.  Mild to moderate crepitus in both knees.  No swelling noted.  Neuro: Cranial nerves II through VIII are grossly intact.  No lateralizing signs are seen.  Gait is normal.  Psych: Patient is alert and cooperative.  " No unusual thought process present.  Insight and judgment is good.  Does not appear to be overtly anxious or depressed on today's visit.      Labs: Reviewed and ordered    Assessment & Plan:     59 y.o. female with the following -     1. Adult general medical exam  Patient's exam was performed.  General healthcare issues addressed and baseline labs ordered.  - Comp Metabolic Panel; Future  - Lipid Profile; Future  - URINALYSIS,CULTURE IF INDICATED; Future  - ESTIMATED GFR; Future  - CBC WITH DIFFERENTIAL; Future    2. Encounter to establish care with new doctor  Patient establish care today.  We went over her health history.  - Comp Metabolic Panel; Future  - Lipid Profile; Future  - URINALYSIS,CULTURE IF INDICATED; Future  - ESTIMATED GFR; Future  - CBC WITH DIFFERENTIAL; Future    3. Postoperative hypothyroidism  This is a chronic problem.  Lab ordered.  Medication renewed.  - TSH WITH REFLEX TO FT4; Future  - levothyroxine (SYNTHROID) 137 MCG Tab; Take 1 tablet Monday through Saturday, then 2 tablets on Sunday  Dispense: 100 Tablet; Refill: 3    4. Primary osteoarthritis involving multiple joints  This is a chronic problem.  Referral to orthopedics made to evaluate her knee.  I told her at this point she may need a knee replacement she can discuss that with them.  - Referral to Orthopedics    5. Essential hypertension  This is a chronic stable condition.  Continue current medications.  - Comp Metabolic Panel; Future  - Lipid Profile; Future  - URINALYSIS,CULTURE IF INDICATED; Future  - ESTIMATED GFR; Future  - CBC WITH DIFFERENTIAL; Future    6. ILYA (obstructive sleep apnea)  This is a chronic problem.  Patient referred to sleep medicine to get reestablished and to restart appropriate treatment.  - Referral to Pulmonary and Sleep Medicine    7. Encounter for screening mammogram for malignant neoplasm of breast  This is a screening issue.  Mammogram ordered.  - MA-SCREENING MAMMO BILAT W/TOMOSYNTHESIS W/CAD;  Future    8. Severe obesity (HCC)  This is a chronic problem.  Encourage weight reduction.    9. Body mass index (BMI) of 45.0-49.9 in adult (HCC)  This is a chronic problem.  Encouraged weight reduction.    10. Anxiety  This is a chronic problem.  States she is having trouble with sleep as well will change her to Remeron.  She is to call in 1 to 2 weeks to let me know how she is doing on that as we may need to adjust the dose.    HCC Gap Form    Diagnosis: E66.01 - Severe obesity (HCC)  Z68.42 - Body mass index (BMI) of 45.0-49.9 in adult (HCC)  The current BMI is 45.38 kg/m² as of 12/02/24.    Past BMI Values:  12/02/24 : 45.38 kg/m². 12/14/22 : 48.06 kg/m². 11/29/22 : 47.89 kg/m².   Assessment and plan: Chronic, stable. Encouraged healthy diet and physical activity changes with a goal of weight loss. Follow up at least annually.  Last edited 12/02/24 10:11 PST by Sukhwinder Ricardo III, M.D.         Return in about 6 months (around 6/2/2025) for Long.    Please note that this dictation was created using voice recognition software. I have made every reasonable attempt to correct obvious errors, but I expect that there are errors of grammar and possibly content that I did not discover before finalizing the note.

## 2024-12-02 NOTE — ASSESSMENT & PLAN NOTE
This is a chronic problem.  She states she has not used her CPAP machine for the last year as the company she was getting it through apparently went out of business.  She needs a referral to a new sleep medicine doctor.

## 2024-12-02 NOTE — ASSESSMENT & PLAN NOTE
This is a chronic problem.  She is due for TSH and that will be ordered.  Medication will also be renewed.

## 2024-12-02 NOTE — ASSESSMENT & PLAN NOTE
This is a chronic problem.  She has arthritic symptoms in multiple joints.  The most prevalent one at the present time is her right knee.  She was told is pretty much bone-on-bone.  She states Tylenol helps somewhat as well as Aleve.  She has had injections that were not very successful.  She thinks is progressing would like to see an orthopedist for further evaluation.  She is seen [facility] but would like to go to either [facility] or the [facility].

## 2024-12-05 ENCOUNTER — HOSPITAL ENCOUNTER (OUTPATIENT)
Dept: RADIOLOGY | Facility: MEDICAL CENTER | Age: 59
End: 2024-12-05
Payer: MEDICARE

## 2024-12-19 DIAGNOSIS — K21.9 GASTROESOPHAGEAL REFLUX DISEASE WITHOUT ESOPHAGITIS: ICD-10-CM

## 2024-12-19 RX ORDER — OMEPRAZOLE 40 MG/1
40 CAPSULE, DELAYED RELEASE ORAL EVERY MORNING
Qty: 100 CAPSULE | Refills: 3 | Status: SHIPPED | OUTPATIENT
Start: 2024-12-19

## 2024-12-19 RX ORDER — SERTRALINE HYDROCHLORIDE 100 MG/1
100 TABLET, FILM COATED ORAL DAILY
Qty: 100 TABLET | Refills: 3 | Status: SHIPPED | OUTPATIENT
Start: 2024-12-19

## 2024-12-19 RX ORDER — SERTRALINE HYDROCHLORIDE 25 MG/1
25 TABLET, FILM COATED ORAL DAILY
Qty: 100 TABLET | Refills: 3 | Status: SHIPPED | OUTPATIENT
Start: 2024-12-19

## 2024-12-30 PROBLEM — M17.12 DEGENERATIVE ARTHRITIS OF LEFT KNEE: Status: ACTIVE | Noted: 2024-12-30

## 2025-01-10 ENCOUNTER — PATIENT MESSAGE (OUTPATIENT)
Dept: HEALTH INFORMATION MANAGEMENT | Facility: OTHER | Age: 60
End: 2025-01-10

## 2025-01-13 ENCOUNTER — APPOINTMENT (OUTPATIENT)
Dept: RADIOLOGY | Facility: MEDICAL CENTER | Age: 60
End: 2025-01-13
Attending: FAMILY MEDICINE
Payer: MEDICARE

## 2025-01-16 ENCOUNTER — OFFICE VISIT (OUTPATIENT)
Dept: MEDICAL GROUP | Facility: PHYSICIAN GROUP | Age: 60
End: 2025-01-16
Payer: MEDICARE

## 2025-01-16 VITALS
TEMPERATURE: 97.9 F | RESPIRATION RATE: 18 BRPM | DIASTOLIC BLOOD PRESSURE: 70 MMHG | HEIGHT: 64 IN | OXYGEN SATURATION: 96 % | WEIGHT: 264 LBS | SYSTOLIC BLOOD PRESSURE: 124 MMHG | HEART RATE: 60 BPM | BODY MASS INDEX: 45.07 KG/M2

## 2025-01-16 DIAGNOSIS — E66.01 SEVERE OBESITY (HCC): ICD-10-CM

## 2025-01-16 DIAGNOSIS — Z01.818 PREOP EXAMINATION: ICD-10-CM

## 2025-01-16 DIAGNOSIS — I10 ESSENTIAL HYPERTENSION: Chronic | ICD-10-CM

## 2025-01-16 DIAGNOSIS — R94.31 ABNORMAL EKG: ICD-10-CM

## 2025-01-16 DIAGNOSIS — G47.33 OSA (OBSTRUCTIVE SLEEP APNEA): ICD-10-CM

## 2025-01-16 DIAGNOSIS — M17.11 PRIMARY OSTEOARTHRITIS OF RIGHT KNEE: ICD-10-CM

## 2025-01-16 DIAGNOSIS — E89.0 POSTOPERATIVE HYPOTHYROIDISM: ICD-10-CM

## 2025-01-16 PROBLEM — Z76.89 ENCOUNTER TO ESTABLISH CARE WITH NEW DOCTOR: Status: RESOLVED | Noted: 2024-12-02 | Resolved: 2025-01-16

## 2025-01-16 PROCEDURE — 99214 OFFICE O/P EST MOD 30 MIN: CPT | Performed by: FAMILY MEDICINE

## 2025-01-16 PROCEDURE — 3074F SYST BP LT 130 MM HG: CPT | Performed by: FAMILY MEDICINE

## 2025-01-16 PROCEDURE — 3078F DIAST BP <80 MM HG: CPT | Performed by: FAMILY MEDICINE

## 2025-01-16 ASSESSMENT — PATIENT HEALTH QUESTIONNAIRE - PHQ9: CLINICAL INTERPRETATION OF PHQ2 SCORE: 0

## 2025-01-16 NOTE — ASSESSMENT & PLAN NOTE
This is a chronic problem.  She has lost a little bit of weight over the years.  Continue to work on this.

## 2025-01-16 NOTE — ASSESSMENT & PLAN NOTE
This is a chronic problem.  Her EKG remains unchanged from 2016/2017.  Report and evaluation from cardiology in 2020 indicated that it was artifactual and they did not feel that it was related to heart disease.

## 2025-01-16 NOTE — ASSESSMENT & PLAN NOTE
Patient is here today for preop exam for pending right knee replacement.  She states she is feeling well except for her knee.  She has not done the labs I ordered as of yet she was reminded to do so.  She did have a comprehensive panel done through her orthopedist and it was normal.  An EKG was done that showed some T wave changes that are unchanged from 2016/2017.  Review of her chart shows that she saw cardiologist for this in 2020 and they determined that was artifactual and did not represent heart disease.  She is not complaining of any cardiac symptoms.

## 2025-01-16 NOTE — PROGRESS NOTES
Subjective:     CC: Here for preop exam.    HPI:   Luann presents today with the following medical concerns:    Preop examination  Patient is here today for preop exam for pending right knee replacement.  She states she is feeling well except for her knee.  She has not done the labs I ordered as of yet she was reminded to do so.  She did have a comprehensive panel done through her orthopedist and it was normal.  An EKG was done that showed some T wave changes that are unchanged from 2016/2017.  Review of her chart shows that she saw cardiologist for this in 2020 and they determined that was artifactual and did not represent heart disease.  She is not complaining of any cardiac symptoms.    Primary osteoarthritis of right knee  This is a chronic problem.  Referral to orthopedic notes.    Postoperative hypothyroidism  This is a chronic problem.  Patient is on supplementation.  She was reminded to do her labs.    ILYA (obstructive sleep apnea)  This is a chronic problem.  She currently is not on a CPAP.  She has been referred to sleep medicine to get reassessed.    Essential hypertension  This is a chronic stable condition.  Blood pressure is well-controlled on current medications.    Abnormal EKG  This is a chronic problem.  Her EKG remains unchanged from 2016/2017.  Report and evaluation from cardiology in 2020 indicated that it was artifactual and they did not feel that it was related to heart disease.    BMI 45.0-49.9, adult (HCC)  This is a chronic problem.  She has lost a little bit of weight over the years.  Continue to work on this.    Past Medical History:   Diagnosis Date    Anesthesia     slow to wake up    Anxiety disorder     Arthritis      knees    Cancer (HCC) 2000    thyroid ca-thyroidectomy. Radiation tx. No chemo.    Chickenpox as child    Chronic nausea     Delayed emergence from general anesthesia     Dental disorder     upper/lower dentures    Edema 10/2021    to ankles bilateral.  21-continues, followed by PCP.    Elevated alkaline phosphatase level     Essential hypertension     Fatty liver     GERD (gastroesophageal reflux disease)     Heart burn     taking omeprazole    Hypothyroidism     Indigestion     taking omeprazole    Pain 2017    left wrist, 6/10    Palpitations     Rheumatic fever as child    Seasonal allergies     Sleep apnea     CPAP with Oxygen 2L @ night    Snoring     no sleep study    Unspecified disorder of thyroid     thyroid cancer w/ radiation/surgery       Social History     Tobacco Use    Smoking status: Former     Current packs/day: 0.00     Average packs/day: 0.5 packs/day for 5.0 years (2.5 ttl pk-yrs)     Types: Cigarettes     Start date: 1991     Quit date: 1996     Years since quittin.0    Smokeless tobacco: Never    Tobacco comments:        Vaping Use    Vaping status: Never Used   Substance Use Topics    Alcohol use: Yes     Comment: Maybe one glass wine very other month    Drug use: Not Currently       Current Outpatient Medications Ordered in Epic   Medication Sig Dispense Refill    omeprazole (PRILOSEC) 40 MG delayed-release capsule Take 1 Capsule by mouth every morning. Indications: Gastroesophageal Reflux Disease 100 Capsule 3    sertraline (ZOLOFT) 100 MG Tab Take 1 Tablet by mouth every day. 100 Tablet 3    sertraline (ZOLOFT) 25 MG tablet Take 1 Tablet by mouth every day. 100 Tablet 3    levothyroxine (SYNTHROID) 137 MCG Tab Take 1 tablet Monday through Saturday, then 2 tablets on  100 Tablet 3    oxybutynin SR (DITROPAN-XL) 5 MG TABLET SR 24 HR Take 1 Tablet by mouth every day. 30 Tablet 3    atenolol (TENORMIN) 50 MG Tab TAKE 1 TABLET BY MOUTH IN THE MORNING AND 1/2 AT NIGHT 135 Tablet 0    lisinopril (PRINIVIL) 20 MG Tab Take 1 Tablet by mouth every day. 90 Tablet 1    aspirin EC (ECOTRIN) 325 MG Tablet Delayed Response Take 1 Tablet by mouth every day.      acetaminophen (TYLENOL) 650 MG CR tablet Take 1 Tablet  "by mouth every day.       No current [facility]-ordered facility-administered medications on file.       Allergies:  Bactrim [sulfamethoxazole w-trimethoprim], Diclofenac, Doxycycline, Hydrocodone, Sulfamethizole, Tetracycline, Tramadol, Zpack [azithromycin], and Sulfamethoxazole    Health Maintenance: Completed    ROS:  Gen: no fevers/chills, no changes in weight  Eyes: no changes in vision  ENT: no sore throat, no hearing loss, no bloody nose  Pulm: no sob, no cough  CV: no chest pain, no palpitations  GI: no nausea/vomiting, no diarrhea  : no dysuria  MSk: no myalgias  Skin: no rash  Neuro: no headaches, no numbness/tingling  Heme/Lymph: no easy bruising      Objective:       Exam:  /70 (BP Location: Right arm, Patient Position: Sitting, BP Cuff Size: Large adult)   Pulse 60   Temp 36.6 °C (97.9 °F) (Temporal)   Resp 18   Ht 1.626 m (5' 4\")   Wt 120 kg (264 lb)   LMP 03/30/2014   SpO2 96%   BMI 45.32 kg/m²  Body mass index is 45.32 kg/m².    Gen: Alert and oriented, No apparent distress.  Eyes:   Extraocular motions intact.  No scleral icterus seen.  Neck: Neck is supple without lymphadenopathy.  Lungs: Normal effort, CTA bilaterally, no wheezes, rhonchi, or rales  CV: Regular rate and rhythm. No murmurs, rubs, or gallops.  Abdomen: Benign  Ext: No clubbing, cyanosis, edema.  Refer to orthopedic notes regarding her right knee  Neuro: Cranial nerves II through VIII are grossly intact.  No lateralized signs are seen.  Labs: Chemistry panel reviewed.  Additional labs ordered by me pending.    Assessment & Plan:     59 y.o. female with the following -     1. Severe obesity (HCC)  This is a chronic problem.  Continue encourage weight reduction.    2. Body mass index (BMI) of 45.0-49.9 in adult (HCC)  This is a chronic problem.  Continue current weight reduction.    3. Preop examination  Patient's preop exam today is unremarkable.  She is an intermediate risk of complications due to her sleep apnea, obesity " and hypertension.      4. Primary osteoarthritis of right knee  This is a chronic problem.  Refer to orthopedic note.    5. Abnormal EKG  This is a chronic stable condition.  Unchanged per records.  And no significant heart disease per cardiology evaluation 2020.    6. Postoperative hypothyroidism  This is a chronic problem.  Patient reminded to do her lab.    7. ILYA (obstructive sleep apnea)  This is a chronic problem.  Referral to sleep medicine was made a month ago.  Follow-up with them.    8. Essential hypertension  This is a chronic stable condition.  Continue current medications.    9. BMI 45.0-49.9, adult ([facility])  This is a chronic problem.  Continue to encourage weight reduction.    [facility]    Diagnosis: E66.01 - Severe obesity ([facility])  Z68.42 - Body mass index (BMI) of 45.0-49.9 in adult ([facility])  The current BMI is 45.32 kg/m² as of 01/16/25.    Past BMI Values:  01/16/25 : 45.32 kg/m². 12/30/24 : 44.63 kg/m². 12/02/24 : 45.38 kg/m².   Assessment and plan: Chronic, improving. Encouraged healthy diet and physical activity changes with a goal of weight loss. Follow up at least annually.  Last edited 01/16/25 09:54 PST by Sukhwinder Ricardo III, M.D.         Return if symptoms worsen or fail to improve.    Please note that this dictation was created using voice recognition software. I have made every reasonable attempt to correct obvious errors, but I expect that there are errors of grammar and possibly content that I did not discover before finalizing the note.

## 2025-01-16 NOTE — ASSESSMENT & PLAN NOTE
This is a chronic problem.  She currently is not on a CPAP.  She has been referred to sleep medicine to get reassessed.

## 2025-01-27 ENCOUNTER — APPOINTMENT (OUTPATIENT)
Dept: ADMISSIONS | Facility: MEDICAL CENTER | Age: 60
End: 2025-01-27
Attending: ORTHOPAEDIC SURGERY
Payer: MEDICARE

## 2025-01-31 ENCOUNTER — PRE-ADMISSION TESTING (OUTPATIENT)
Dept: ADMISSIONS | Facility: MEDICAL CENTER | Age: 60
End: 2025-01-31
Attending: ORTHOPAEDIC SURGERY
Payer: MEDICARE

## 2025-01-31 VITALS — WEIGHT: 264.11 LBS | BODY MASS INDEX: 45.09 KG/M2 | HEIGHT: 64 IN

## 2025-01-31 DIAGNOSIS — Z01.810 PRE-OPERATIVE CARDIOVASCULAR EXAMINATION: ICD-10-CM

## 2025-01-31 DIAGNOSIS — Z01.818 PREOP EXAMINATION: ICD-10-CM

## 2025-01-31 DIAGNOSIS — Z01.812 PRE-OPERATIVE LABORATORY EXAMINATION: ICD-10-CM

## 2025-01-31 LAB
ALBUMIN SERPL BCP-MCNC: 3.7 G/DL (ref 3.2–4.9)
ALBUMIN/GLOB SERPL: 1.1 G/DL
ALP SERPL-CCNC: 156 U/L (ref 30–99)
ALT SERPL-CCNC: 10 U/L (ref 2–50)
ANION GAP SERPL CALC-SCNC: 9 MMOL/L (ref 7–16)
AST SERPL-CCNC: 13 U/L (ref 12–45)
BILIRUB SERPL-MCNC: <0.2 MG/DL (ref 0.1–1.5)
BUN SERPL-MCNC: 15 MG/DL (ref 8–22)
CALCIUM ALBUM COR SERPL-MCNC: 8.7 MG/DL (ref 8.5–10.5)
CALCIUM SERPL-MCNC: 8.5 MG/DL (ref 8.4–10.2)
CHLORIDE SERPL-SCNC: 102 MMOL/L (ref 96–112)
CO2 SERPL-SCNC: 25 MMOL/L (ref 20–33)
CREAT SERPL-MCNC: 0.92 MG/DL (ref 0.5–1.4)
EKG IMPRESSION: NORMAL
ERYTHROCYTE [DISTWIDTH] IN BLOOD BY AUTOMATED COUNT: 46.8 FL (ref 35.9–50)
GFR SERPLBLD CREATININE-BSD FMLA CKD-EPI: 71 ML/MIN/1.73 M 2
GLOBULIN SER CALC-MCNC: 3.3 G/DL (ref 1.9–3.5)
GLUCOSE SERPL-MCNC: 105 MG/DL (ref 65–99)
HCT VFR BLD AUTO: 37.5 % (ref 37–47)
HGB BLD-MCNC: 12 G/DL (ref 12–16)
MCH RBC QN AUTO: 28.5 PG (ref 27–33)
MCHC RBC AUTO-ENTMCNC: 32 G/DL (ref 32.2–35.5)
MCV RBC AUTO: 89.1 FL (ref 81.4–97.8)
PLATELET # BLD AUTO: 183 K/UL (ref 164–446)
PMV BLD AUTO: 9.6 FL (ref 9–12.9)
POTASSIUM SERPL-SCNC: 4.5 MMOL/L (ref 3.6–5.5)
PROT SERPL-MCNC: 7 G/DL (ref 6–8.2)
RBC # BLD AUTO: 4.21 M/UL (ref 4.2–5.4)
SCCMEC + MECA PNL NOSE NAA+PROBE: NEGATIVE
SCCMEC + MECA PNL NOSE NAA+PROBE: POSITIVE
SODIUM SERPL-SCNC: 136 MMOL/L (ref 135–145)
WBC # BLD AUTO: 6.4 K/UL (ref 4.8–10.8)

## 2025-01-31 PROCEDURE — 87641 MR-STAPH DNA AMP PROBE: CPT

## 2025-01-31 PROCEDURE — 93010 ELECTROCARDIOGRAM REPORT: CPT | Performed by: INTERNAL MEDICINE

## 2025-01-31 PROCEDURE — 93005 ELECTROCARDIOGRAM TRACING: CPT | Mod: TC

## 2025-01-31 PROCEDURE — 85027 COMPLETE CBC AUTOMATED: CPT

## 2025-01-31 PROCEDURE — 87640 STAPH A DNA AMP PROBE: CPT | Mod: XU

## 2025-01-31 PROCEDURE — 36415 COLL VENOUS BLD VENIPUNCTURE: CPT

## 2025-01-31 PROCEDURE — 80053 COMPREHEN METABOLIC PANEL: CPT

## 2025-01-31 RX ORDER — ACETAMINOPHEN 500 MG
500-1000 TABLET ORAL EVERY 6 HOURS PRN
Status: ON HOLD | COMMUNITY
End: 2025-02-21

## 2025-01-31 NOTE — PREADMIT AVS NOTE
Current Medications   Medication Instructions    Fiber 500 MG Cap Hold medication day of procedure    acetaminophen (TYLENOL) 500 MG Tab As needed medication, may take if needed, including morning of procedure     omeprazole (PRILOSEC) 40 MG delayed-release capsule Continue taking medication as prescribed, including morning of procedure     sertraline (ZOLOFT) 100 MG Tab Continue taking medication as prescribed, including morning of procedure     sertraline (ZOLOFT) 25 MG tablet Continue taking medication as prescribed, including morning of procedure     levothyroxine (SYNTHROID) 137 MCG Tab Continue taking medication as prescribed, including morning of procedure     oxybutynin SR (DITROPAN-XL) 5 MG TABLET SR 24 HR Continue taking medication as prescribed, including morning of procedure     atenolol (TENORMIN) 50 MG Tab Continue taking medication as prescribed, including morning of procedure     lisinopril (PRINIVIL) 20 MG Tab Stop 24 hours before surgery    aspirin EC (ECOTRIN) 325 MG Tablet Delayed Response Follow instructions from surgeon or specialist.

## 2025-01-31 NOTE — PREPROCEDURE INSTRUCTIONS
PreAdmit Appointment: Reviewed the Preparing for your procedure handout with patient. Patient instructed per pharmacy guidelines regarding taking, holding or contacting provider for instructions on regularly prescribed medications before surgery. Instructed to take the following medications the day of surgery with a sip of water per pharmacy medication guidelines: Tylenol if needed, Sertraline, Atenolol, Levothyroxine, Omeprazole, Oxybutynin  Pt reports slow to wake up with anesthesia, advised to discuss with anesthesia day of surgery  Pt instructed to bring CPAP day of surgery      Confirmed with patient where to check in morning of surgery. Handouts/Brochure/Video emailed to patient.

## 2025-02-04 ENCOUNTER — HOSPITAL ENCOUNTER (OUTPATIENT)
Dept: RADIOLOGY | Facility: MEDICAL CENTER | Age: 60
End: 2025-02-04
Attending: ORTHOPAEDIC SURGERY
Payer: MEDICARE

## 2025-02-04 DIAGNOSIS — M17.11 PRIMARY OSTEOARTHRITIS OF RIGHT KNEE: ICD-10-CM

## 2025-02-04 PROCEDURE — 73700 CT LOWER EXTREMITY W/O DYE: CPT | Mod: RT

## 2025-02-06 ENCOUNTER — TELEPHONE (OUTPATIENT)
Dept: HEALTH INFORMATION MANAGEMENT | Facility: OTHER | Age: 60
End: 2025-02-06
Payer: MEDICARE

## 2025-02-19 ENCOUNTER — APPOINTMENT (OUTPATIENT)
Dept: RADIOLOGY | Facility: MEDICAL CENTER | Age: 60
End: 2025-02-19
Attending: FAMILY MEDICINE
Payer: MEDICARE

## 2025-02-20 ENCOUNTER — HOSPITAL ENCOUNTER (OUTPATIENT)
Facility: MEDICAL CENTER | Age: 60
End: 2025-02-21
Attending: ORTHOPAEDIC SURGERY | Admitting: ORTHOPAEDIC SURGERY
Payer: MEDICARE

## 2025-02-20 ENCOUNTER — ANESTHESIA EVENT (OUTPATIENT)
Dept: SURGERY | Facility: MEDICAL CENTER | Age: 60
End: 2025-02-20
Payer: MEDICARE

## 2025-02-20 ENCOUNTER — ANESTHESIA (OUTPATIENT)
Dept: SURGERY | Facility: MEDICAL CENTER | Age: 60
End: 2025-02-20
Payer: MEDICARE

## 2025-02-20 DIAGNOSIS — M17.12 PRIMARY OSTEOARTHRITIS OF LEFT KNEE: ICD-10-CM

## 2025-02-20 DIAGNOSIS — G89.18 POSTOPERATIVE PAIN: ICD-10-CM

## 2025-02-20 DIAGNOSIS — M17.11 PRIMARY OSTEOARTHRITIS OF RIGHT KNEE: ICD-10-CM

## 2025-02-20 PROCEDURE — 700102 HCHG RX REV CODE 250 W/ 637 OVERRIDE(OP): Performed by: ORTHOPAEDIC SURGERY

## 2025-02-20 PROCEDURE — 20985 CPTR-ASST DIR MS PX: CPT | Mod: ASROC

## 2025-02-20 PROCEDURE — A9270 NON-COVERED ITEM OR SERVICE: HCPCS | Performed by: ORTHOPAEDIC SURGERY

## 2025-02-20 PROCEDURE — G0378 HOSPITAL OBSERVATION PER HR: HCPCS

## 2025-02-20 PROCEDURE — 700101 HCHG RX REV CODE 250: Performed by: ANESTHESIOLOGY

## 2025-02-20 PROCEDURE — 160042 HCHG SURGERY MINUTES - EA ADDL 1 MIN LEVEL 5: Performed by: ORTHOPAEDIC SURGERY

## 2025-02-20 PROCEDURE — 700111 HCHG RX REV CODE 636 W/ 250 OVERRIDE (IP): Performed by: ORTHOPAEDIC SURGERY

## 2025-02-20 PROCEDURE — 96375 TX/PRO/DX INJ NEW DRUG ADDON: CPT | Mod: XU

## 2025-02-20 PROCEDURE — A9270 NON-COVERED ITEM OR SERVICE: HCPCS | Performed by: STUDENT IN AN ORGANIZED HEALTH CARE EDUCATION/TRAINING PROGRAM

## 2025-02-20 PROCEDURE — 64447 NJX AA&/STRD FEMORAL NRV IMG: CPT | Performed by: ORTHOPAEDIC SURGERY

## 2025-02-20 PROCEDURE — 160015 HCHG STAT PREOP MINUTES: Performed by: ORTHOPAEDIC SURGERY

## 2025-02-20 PROCEDURE — 700111 HCHG RX REV CODE 636 W/ 250 OVERRIDE (IP): Mod: JZ | Performed by: ANESTHESIOLOGY

## 2025-02-20 PROCEDURE — 20985 CPTR-ASST DIR MS PX: CPT | Performed by: ORTHOPAEDIC SURGERY

## 2025-02-20 PROCEDURE — C1776 JOINT DEVICE (IMPLANTABLE): HCPCS | Performed by: ORTHOPAEDIC SURGERY

## 2025-02-20 PROCEDURE — 160009 HCHG ANES TIME/MIN: Performed by: ORTHOPAEDIC SURGERY

## 2025-02-20 PROCEDURE — 700105 HCHG RX REV CODE 258: Performed by: ANESTHESIOLOGY

## 2025-02-20 PROCEDURE — 700102 HCHG RX REV CODE 250 W/ 637 OVERRIDE(OP): Performed by: STUDENT IN AN ORGANIZED HEALTH CARE EDUCATION/TRAINING PROGRAM

## 2025-02-20 PROCEDURE — 160048 HCHG OR STATISTICAL LEVEL 1-5: Performed by: ORTHOPAEDIC SURGERY

## 2025-02-20 PROCEDURE — 160035 HCHG PACU - 1ST 60 MINS PHASE I: Performed by: ORTHOPAEDIC SURGERY

## 2025-02-20 PROCEDURE — 96365 THER/PROPH/DIAG IV INF INIT: CPT | Mod: XU

## 2025-02-20 PROCEDURE — 160036 HCHG PACU - EA ADDL 30 MINS PHASE I: Performed by: ORTHOPAEDIC SURGERY

## 2025-02-20 PROCEDURE — 700101 HCHG RX REV CODE 250: Performed by: ORTHOPAEDIC SURGERY

## 2025-02-20 PROCEDURE — 27447 TOTAL KNEE ARTHROPLASTY: CPT | Mod: ASROC,RT

## 2025-02-20 PROCEDURE — 27447 TOTAL KNEE ARTHROPLASTY: CPT | Mod: RT | Performed by: ORTHOPAEDIC SURGERY

## 2025-02-20 PROCEDURE — C1713 ANCHOR/SCREW BN/BN,TIS/BN: HCPCS | Performed by: ORTHOPAEDIC SURGERY

## 2025-02-20 PROCEDURE — 160031 HCHG SURGERY MINUTES - 1ST 30 MINS LEVEL 5: Performed by: ORTHOPAEDIC SURGERY

## 2025-02-20 PROCEDURE — 160002 HCHG RECOVERY MINUTES (STAT): Performed by: ORTHOPAEDIC SURGERY

## 2025-02-20 PROCEDURE — 94760 N-INVAS EAR/PLS OXIMETRY 1: CPT

## 2025-02-20 PROCEDURE — 700105 HCHG RX REV CODE 258: Performed by: ORTHOPAEDIC SURGERY

## 2025-02-20 PROCEDURE — 502714 HCHG ROBOTIC SURGERY SERVICES: Performed by: ORTHOPAEDIC SURGERY

## 2025-02-20 DEVICE — IMPLANTABLE DEVICE: Type: IMPLANTABLE DEVICE | Status: FUNCTIONAL

## 2025-02-20 DEVICE — BASEPLATE TIBIAL TRIATHLON TRITANIUM SIZE 3 (1EA): Type: IMPLANTABLE DEVICE | Status: FUNCTIONAL

## 2025-02-20 RX ORDER — ALBUTEROL SULFATE 5 MG/ML
2.5 SOLUTION RESPIRATORY (INHALATION)
Status: DISCONTINUED | OUTPATIENT
Start: 2025-02-20 | End: 2025-02-20 | Stop reason: HOSPADM

## 2025-02-20 RX ORDER — DEXAMETHASONE SODIUM PHOSPHATE 4 MG/ML
INJECTION, SOLUTION INTRA-ARTICULAR; INTRALESIONAL; INTRAMUSCULAR; INTRAVENOUS; SOFT TISSUE
Status: COMPLETED | OUTPATIENT
Start: 2025-02-20 | End: 2025-02-20

## 2025-02-20 RX ORDER — ATENOLOL 25 MG/1
50 TABLET ORAL
Status: DISCONTINUED | OUTPATIENT
Start: 2025-02-21 | End: 2025-02-21 | Stop reason: HOSPADM

## 2025-02-20 RX ORDER — OMEPRAZOLE 20 MG/1
40 CAPSULE, DELAYED RELEASE ORAL EVERY MORNING
Status: DISCONTINUED | OUTPATIENT
Start: 2025-02-21 | End: 2025-02-21 | Stop reason: HOSPADM

## 2025-02-20 RX ORDER — HYDRALAZINE HYDROCHLORIDE 20 MG/ML
5 INJECTION INTRAMUSCULAR; INTRAVENOUS
Status: DISCONTINUED | OUTPATIENT
Start: 2025-02-20 | End: 2025-02-20 | Stop reason: HOSPADM

## 2025-02-20 RX ORDER — ACETAMINOPHEN 325 MG/1
650 TABLET ORAL EVERY 6 HOURS PRN
Status: DISCONTINUED | OUTPATIENT
Start: 2025-02-26 | End: 2025-02-21 | Stop reason: HOSPADM

## 2025-02-20 RX ORDER — HYDROMORPHONE HYDROCHLORIDE 1 MG/ML
0.25 INJECTION, SOLUTION INTRAMUSCULAR; INTRAVENOUS; SUBCUTANEOUS
Status: DISCONTINUED | OUTPATIENT
Start: 2025-02-20 | End: 2025-02-21 | Stop reason: HOSPADM

## 2025-02-20 RX ORDER — MIDAZOLAM HYDROCHLORIDE 1 MG/ML
1 INJECTION INTRAMUSCULAR; INTRAVENOUS
Status: DISCONTINUED | OUTPATIENT
Start: 2025-02-20 | End: 2025-02-20 | Stop reason: HOSPADM

## 2025-02-20 RX ORDER — AMOXICILLIN 250 MG
1 CAPSULE ORAL
Status: DISCONTINUED | OUTPATIENT
Start: 2025-02-20 | End: 2025-02-21 | Stop reason: HOSPADM

## 2025-02-20 RX ORDER — BUPIVACAINE HYDROCHLORIDE 2.5 MG/ML
INJECTION, SOLUTION EPIDURAL; INFILTRATION; INTRACAUDAL
Status: DISCONTINUED | OUTPATIENT
Start: 2025-02-20 | End: 2025-02-20 | Stop reason: HOSPADM

## 2025-02-20 RX ORDER — EPHEDRINE SULFATE 50 MG/ML
5 INJECTION, SOLUTION INTRAVENOUS
Status: DISCONTINUED | OUTPATIENT
Start: 2025-02-20 | End: 2025-02-20 | Stop reason: HOSPADM

## 2025-02-20 RX ORDER — AMOXICILLIN 250 MG
1 CAPSULE ORAL NIGHTLY
Status: DISCONTINUED | OUTPATIENT
Start: 2025-02-20 | End: 2025-02-21 | Stop reason: HOSPADM

## 2025-02-20 RX ORDER — ATENOLOL 25 MG/1
25 TABLET ORAL EVERY EVENING
Status: DISCONTINUED | OUTPATIENT
Start: 2025-02-20 | End: 2025-02-21 | Stop reason: HOSPADM

## 2025-02-20 RX ORDER — DIPHENHYDRAMINE HYDROCHLORIDE 50 MG/ML
25 INJECTION INTRAMUSCULAR; INTRAVENOUS EVERY 6 HOURS PRN
Status: DISCONTINUED | OUTPATIENT
Start: 2025-02-20 | End: 2025-02-21 | Stop reason: HOSPADM

## 2025-02-20 RX ORDER — HALOPERIDOL 5 MG/ML
1 INJECTION INTRAMUSCULAR
Status: DISCONTINUED | OUTPATIENT
Start: 2025-02-20 | End: 2025-02-20 | Stop reason: HOSPADM

## 2025-02-20 RX ORDER — POLYETHYLENE GLYCOL 3350 17 G/17G
1 POWDER, FOR SOLUTION ORAL 2 TIMES DAILY PRN
Status: DISCONTINUED | OUTPATIENT
Start: 2025-02-20 | End: 2025-02-21 | Stop reason: HOSPADM

## 2025-02-20 RX ORDER — OXYCODONE HCL 5 MG/5 ML
5 SOLUTION, ORAL ORAL
Status: DISCONTINUED | OUTPATIENT
Start: 2025-02-20 | End: 2025-02-20 | Stop reason: HOSPADM

## 2025-02-20 RX ORDER — LIDOCAINE HYDROCHLORIDE 20 MG/ML
INJECTION, SOLUTION EPIDURAL; INFILTRATION; INTRACAUDAL; PERINEURAL PRN
Status: DISCONTINUED | OUTPATIENT
Start: 2025-02-20 | End: 2025-02-20 | Stop reason: SURG

## 2025-02-20 RX ORDER — LISINOPRIL 20 MG/1
20 TABLET ORAL EVERY MORNING
Status: DISCONTINUED | OUTPATIENT
Start: 2025-02-21 | End: 2025-02-21 | Stop reason: HOSPADM

## 2025-02-20 RX ORDER — OXYCODONE HCL 5 MG/5 ML
10 SOLUTION, ORAL ORAL
Status: DISCONTINUED | OUTPATIENT
Start: 2025-02-20 | End: 2025-02-20 | Stop reason: HOSPADM

## 2025-02-20 RX ORDER — CEFAZOLIN SODIUM 1 G/3ML
2 INJECTION, POWDER, FOR SOLUTION INTRAMUSCULAR; INTRAVENOUS ONCE
Status: COMPLETED | OUTPATIENT
Start: 2025-02-20 | End: 2025-02-20

## 2025-02-20 RX ORDER — LEVOTHYROXINE SODIUM 137 UG/1
137 TABLET ORAL
Status: DISCONTINUED | OUTPATIENT
Start: 2025-02-21 | End: 2025-02-21 | Stop reason: HOSPADM

## 2025-02-20 RX ORDER — HYDROMORPHONE HYDROCHLORIDE 1 MG/ML
0.2 INJECTION, SOLUTION INTRAMUSCULAR; INTRAVENOUS; SUBCUTANEOUS
Status: DISCONTINUED | OUTPATIENT
Start: 2025-02-20 | End: 2025-02-20 | Stop reason: HOSPADM

## 2025-02-20 RX ORDER — SODIUM CHLORIDE 9 MG/ML
INJECTION, SOLUTION INTRAVENOUS
Status: DISCONTINUED | OUTPATIENT
Start: 2025-02-20 | End: 2025-02-20 | Stop reason: SURG

## 2025-02-20 RX ORDER — MORPHINE SULFATE 0.5 MG/ML
INJECTION, SOLUTION EPIDURAL; INTRATHECAL; INTRAVENOUS
Status: DISCONTINUED | OUTPATIENT
Start: 2025-02-20 | End: 2025-02-20 | Stop reason: HOSPADM

## 2025-02-20 RX ORDER — ONDANSETRON 2 MG/ML
4 INJECTION INTRAMUSCULAR; INTRAVENOUS
Status: COMPLETED | OUTPATIENT
Start: 2025-02-20 | End: 2025-02-20

## 2025-02-20 RX ORDER — HYDROMORPHONE HYDROCHLORIDE 1 MG/ML
0.4 INJECTION, SOLUTION INTRAMUSCULAR; INTRAVENOUS; SUBCUTANEOUS
Status: DISCONTINUED | OUTPATIENT
Start: 2025-02-20 | End: 2025-02-20 | Stop reason: HOSPADM

## 2025-02-20 RX ORDER — KETOROLAC TROMETHAMINE 30 MG/ML
INJECTION, SOLUTION INTRAMUSCULAR; INTRAVENOUS
Status: DISCONTINUED | OUTPATIENT
Start: 2025-02-20 | End: 2025-02-20 | Stop reason: HOSPADM

## 2025-02-20 RX ORDER — OXYCODONE HYDROCHLORIDE 5 MG/1
5 TABLET ORAL
Status: DISCONTINUED | OUTPATIENT
Start: 2025-02-20 | End: 2025-02-21 | Stop reason: HOSPADM

## 2025-02-20 RX ORDER — TRANEXAMIC ACID 100 MG/ML
INJECTION, SOLUTION INTRAVENOUS PRN
Status: DISCONTINUED | OUTPATIENT
Start: 2025-02-20 | End: 2025-02-20 | Stop reason: SURG

## 2025-02-20 RX ORDER — HALOPERIDOL 5 MG/ML
1 INJECTION INTRAMUSCULAR EVERY 6 HOURS PRN
Status: DISCONTINUED | OUTPATIENT
Start: 2025-02-20 | End: 2025-02-21 | Stop reason: HOSPADM

## 2025-02-20 RX ORDER — ACETAMINOPHEN 325 MG/1
650 TABLET ORAL EVERY 6 HOURS
Status: DISCONTINUED | OUTPATIENT
Start: 2025-02-20 | End: 2025-02-21 | Stop reason: HOSPADM

## 2025-02-20 RX ORDER — MIDAZOLAM HYDROCHLORIDE 1 MG/ML
INJECTION INTRAMUSCULAR; INTRAVENOUS PRN
Status: DISCONTINUED | OUTPATIENT
Start: 2025-02-20 | End: 2025-02-20 | Stop reason: SURG

## 2025-02-20 RX ORDER — OXYCODONE HYDROCHLORIDE 5 MG/1
2.5 TABLET ORAL
Status: DISCONTINUED | OUTPATIENT
Start: 2025-02-20 | End: 2025-02-21 | Stop reason: HOSPADM

## 2025-02-20 RX ORDER — ACETAMINOPHEN 500 MG
1000 TABLET ORAL ONCE
Status: COMPLETED | OUTPATIENT
Start: 2025-02-20 | End: 2025-02-20

## 2025-02-20 RX ORDER — SODIUM CHLORIDE, SODIUM LACTATE, POTASSIUM CHLORIDE, CALCIUM CHLORIDE 600; 310; 30; 20 MG/100ML; MG/100ML; MG/100ML; MG/100ML
INJECTION, SOLUTION INTRAVENOUS CONTINUOUS
Status: ACTIVE | OUTPATIENT
Start: 2025-02-20 | End: 2025-02-20

## 2025-02-20 RX ORDER — ATENOLOL 25 MG/1
50 TABLET ORAL
Status: DISCONTINUED | OUTPATIENT
Start: 2025-02-21 | End: 2025-02-20

## 2025-02-20 RX ORDER — EPHEDRINE SULFATE 50 MG/ML
INJECTION, SOLUTION INTRAVENOUS PRN
Status: DISCONTINUED | OUTPATIENT
Start: 2025-02-20 | End: 2025-02-20 | Stop reason: SURG

## 2025-02-20 RX ORDER — OXYBUTYNIN CHLORIDE 5 MG/1
5 TABLET, EXTENDED RELEASE ORAL EVERY MORNING
Status: DISCONTINUED | OUTPATIENT
Start: 2025-02-21 | End: 2025-02-21 | Stop reason: HOSPADM

## 2025-02-20 RX ORDER — MEPERIDINE HYDROCHLORIDE 25 MG/ML
12.5 INJECTION INTRAMUSCULAR; INTRAVENOUS; SUBCUTANEOUS
Status: DISCONTINUED | OUTPATIENT
Start: 2025-02-20 | End: 2025-02-20 | Stop reason: HOSPADM

## 2025-02-20 RX ORDER — SODIUM CHLORIDE, SODIUM LACTATE, POTASSIUM CHLORIDE, CALCIUM CHLORIDE 600; 310; 30; 20 MG/100ML; MG/100ML; MG/100ML; MG/100ML
INJECTION, SOLUTION INTRAVENOUS CONTINUOUS
Status: DISCONTINUED | OUTPATIENT
Start: 2025-02-20 | End: 2025-02-20 | Stop reason: HOSPADM

## 2025-02-20 RX ORDER — HYDRALAZINE HYDROCHLORIDE 20 MG/ML
INJECTION INTRAMUSCULAR; INTRAVENOUS PRN
Status: DISCONTINUED | OUTPATIENT
Start: 2025-02-20 | End: 2025-02-20 | Stop reason: SURG

## 2025-02-20 RX ORDER — DOCUSATE SODIUM 100 MG/1
100 CAPSULE, LIQUID FILLED ORAL 2 TIMES DAILY
Status: DISCONTINUED | OUTPATIENT
Start: 2025-02-20 | End: 2025-02-21 | Stop reason: HOSPADM

## 2025-02-20 RX ORDER — DIPHENHYDRAMINE HYDROCHLORIDE 50 MG/ML
12.5 INJECTION INTRAMUSCULAR; INTRAVENOUS
Status: DISCONTINUED | OUTPATIENT
Start: 2025-02-20 | End: 2025-02-20 | Stop reason: HOSPADM

## 2025-02-20 RX ORDER — SODIUM CHLORIDE 9 MG/ML
INJECTION, SOLUTION INTRAMUSCULAR; INTRAVENOUS; SUBCUTANEOUS
Status: DISCONTINUED | OUTPATIENT
Start: 2025-02-20 | End: 2025-02-20 | Stop reason: HOSPADM

## 2025-02-20 RX ORDER — ONDANSETRON 2 MG/ML
INJECTION INTRAMUSCULAR; INTRAVENOUS PRN
Status: DISCONTINUED | OUTPATIENT
Start: 2025-02-20 | End: 2025-02-20 | Stop reason: SURG

## 2025-02-20 RX ORDER — BISACODYL 10 MG
10 SUPPOSITORY, RECTAL RECTAL
Status: DISCONTINUED | OUTPATIENT
Start: 2025-02-20 | End: 2025-02-21 | Stop reason: HOSPADM

## 2025-02-20 RX ORDER — SCOPOLAMINE 1 MG/3D
1 PATCH, EXTENDED RELEASE TRANSDERMAL
Status: DISCONTINUED | OUTPATIENT
Start: 2025-02-20 | End: 2025-02-21 | Stop reason: HOSPADM

## 2025-02-20 RX ORDER — ASPIRIN 81 MG/1
81 TABLET ORAL 2 TIMES DAILY
Status: DISCONTINUED | OUTPATIENT
Start: 2025-02-21 | End: 2025-02-21 | Stop reason: HOSPADM

## 2025-02-20 RX ORDER — ROPIVACAINE HYDROCHLORIDE 5 MG/ML
INJECTION, SOLUTION EPIDURAL; INFILTRATION; PERINEURAL
Status: COMPLETED | OUTPATIENT
Start: 2025-02-20 | End: 2025-02-20

## 2025-02-20 RX ORDER — OXYCODONE HCL 10 MG/1
10 TABLET, FILM COATED, EXTENDED RELEASE ORAL ONCE
Status: COMPLETED | OUTPATIENT
Start: 2025-02-20 | End: 2025-02-20

## 2025-02-20 RX ORDER — ONDANSETRON 2 MG/ML
4 INJECTION INTRAMUSCULAR; INTRAVENOUS EVERY 4 HOURS PRN
Status: DISCONTINUED | OUTPATIENT
Start: 2025-02-20 | End: 2025-02-21 | Stop reason: HOSPADM

## 2025-02-20 RX ORDER — HYDROMORPHONE HYDROCHLORIDE 2 MG/ML
INJECTION, SOLUTION INTRAMUSCULAR; INTRAVENOUS; SUBCUTANEOUS PRN
Status: DISCONTINUED | OUTPATIENT
Start: 2025-02-20 | End: 2025-02-20 | Stop reason: SURG

## 2025-02-20 RX ORDER — HYDROMORPHONE HYDROCHLORIDE 1 MG/ML
0.1 INJECTION, SOLUTION INTRAMUSCULAR; INTRAVENOUS; SUBCUTANEOUS
Status: DISCONTINUED | OUTPATIENT
Start: 2025-02-20 | End: 2025-02-20 | Stop reason: HOSPADM

## 2025-02-20 RX ORDER — SODIUM PHOSPHATE,MONO-DIBASIC 19G-7G/118
1 ENEMA (ML) RECTAL
Status: DISCONTINUED | OUTPATIENT
Start: 2025-02-20 | End: 2025-02-21 | Stop reason: HOSPADM

## 2025-02-20 RX ORDER — DEXAMETHASONE SODIUM PHOSPHATE 4 MG/ML
INJECTION, SOLUTION INTRA-ARTICULAR; INTRALESIONAL; INTRAMUSCULAR; INTRAVENOUS; SOFT TISSUE PRN
Status: DISCONTINUED | OUTPATIENT
Start: 2025-02-20 | End: 2025-02-20 | Stop reason: SURG

## 2025-02-20 RX ORDER — ROCURONIUM BROMIDE 10 MG/ML
INJECTION, SOLUTION INTRAVENOUS PRN
Status: DISCONTINUED | OUTPATIENT
Start: 2025-02-20 | End: 2025-02-20 | Stop reason: SURG

## 2025-02-20 RX ADMIN — FENTANYL CITRATE 50 MCG: 50 INJECTION, SOLUTION INTRAMUSCULAR; INTRAVENOUS at 15:54

## 2025-02-20 RX ADMIN — TRANEXAMIC ACID 1000 MG: 100 INJECTION, SOLUTION INTRAVENOUS at 15:48

## 2025-02-20 RX ADMIN — ONDANSETRON 4 MG: 2 INJECTION INTRAMUSCULAR; INTRAVENOUS at 16:48

## 2025-02-20 RX ADMIN — HYDRALAZINE HYDROCHLORIDE 5 MG: 20 INJECTION, SOLUTION INTRAMUSCULAR; INTRAVENOUS at 15:57

## 2025-02-20 RX ADMIN — SENNOSIDES AND DOCUSATE SODIUM 1 TABLET: 50; 8.6 TABLET ORAL at 22:49

## 2025-02-20 RX ADMIN — ATENOLOL 25 MG: 25 TABLET ORAL at 19:49

## 2025-02-20 RX ADMIN — HYDROMORPHONE HYDROCHLORIDE 0.4 MG: 2 INJECTION INTRAMUSCULAR; INTRAVENOUS; SUBCUTANEOUS at 15:55

## 2025-02-20 RX ADMIN — DEXAMETHASONE SODIUM PHOSPHATE 8 MG: 4 INJECTION INTRA-ARTICULAR; INTRALESIONAL; INTRAMUSCULAR; INTRAVENOUS; SOFT TISSUE at 15:47

## 2025-02-20 RX ADMIN — DEXAMETHASONE SODIUM PHOSPHATE 2 MG: 4 INJECTION, SOLUTION INTRA-ARTICULAR; INTRALESIONAL; INTRAMUSCULAR; INTRAVENOUS; SOFT TISSUE at 15:18

## 2025-02-20 RX ADMIN — DOCUSATE SODIUM 100 MG: 100 CAPSULE, LIQUID FILLED ORAL at 19:50

## 2025-02-20 RX ADMIN — CEFAZOLIN 3 G: 1 INJECTION, POWDER, FOR SOLUTION INTRAMUSCULAR; INTRAVENOUS at 15:36

## 2025-02-20 RX ADMIN — HYDRALAZINE HYDROCHLORIDE 5 MG: 20 INJECTION INTRAMUSCULAR; INTRAVENOUS at 18:00

## 2025-02-20 RX ADMIN — ROPIVACAINE HYDROCHLORIDE 20 ML: 5 INJECTION EPIDURAL; INFILTRATION; PERINEURAL at 15:18

## 2025-02-20 RX ADMIN — ACETAMINOPHEN 1000 MG: 500 TABLET ORAL at 14:56

## 2025-02-20 RX ADMIN — HALOPERIDOL LACTATE 1 MG: 5 INJECTION, SOLUTION INTRAMUSCULAR at 18:02

## 2025-02-20 RX ADMIN — ROCURONIUM BROMIDE 40 MG: 10 INJECTION INTRAVENOUS at 15:42

## 2025-02-20 RX ADMIN — LIDOCAINE HYDROCHLORIDE 80 MG: 20 INJECTION, SOLUTION EPIDURAL; INFILTRATION; INTRACAUDAL; PERINEURAL at 15:42

## 2025-02-20 RX ADMIN — ACETAMINOPHEN 650 MG: 325 TABLET ORAL at 20:18

## 2025-02-20 RX ADMIN — ONDANSETRON 4 MG: 2 INJECTION INTRAMUSCULAR; INTRAVENOUS at 17:46

## 2025-02-20 RX ADMIN — MIDAZOLAM HYDROCHLORIDE 1 MG: 1 INJECTION, SOLUTION INTRAMUSCULAR; INTRAVENOUS at 15:18

## 2025-02-20 RX ADMIN — EPHEDRINE SULFATE 10 MG: 50 INJECTION, SOLUTION INTRAVENOUS at 16:08

## 2025-02-20 RX ADMIN — CEFAZOLIN 2 G: 2 INJECTION, POWDER, FOR SOLUTION INTRAMUSCULAR; INTRAVENOUS at 20:16

## 2025-02-20 RX ADMIN — HYDRALAZINE HYDROCHLORIDE 5 MG: 20 INJECTION INTRAMUSCULAR; INTRAVENOUS at 17:47

## 2025-02-20 RX ADMIN — PROPOFOL 200 MG: 10 INJECTION, EMULSION INTRAVENOUS at 15:42

## 2025-02-20 RX ADMIN — OXYCODONE HYDROCHLORIDE 10 MG: 10 TABLET, FILM COATED, EXTENDED RELEASE ORAL at 14:55

## 2025-02-20 RX ADMIN — SODIUM CHLORIDE, SODIUM LACTATE, POTASSIUM CHLORIDE, AND CALCIUM CHLORIDE: .6; .31; .03; .02 INJECTION, SOLUTION INTRAVENOUS at 14:57

## 2025-02-20 RX ADMIN — SODIUM CHLORIDE: 9 INJECTION, SOLUTION INTRAVENOUS at 16:27

## 2025-02-20 RX ADMIN — SUGAMMADEX 200 MG: 100 INJECTION, SOLUTION INTRAVENOUS at 16:51

## 2025-02-20 RX ADMIN — FENTANYL CITRATE 50 MCG: 50 INJECTION, SOLUTION INTRAMUSCULAR; INTRAVENOUS at 15:18

## 2025-02-20 SDOH — ECONOMIC STABILITY: TRANSPORTATION INSECURITY
IN THE PAST 12 MONTHS, HAS THE LACK OF TRANSPORTATION KEPT YOU FROM MEDICAL APPOINTMENTS OR FROM GETTING MEDICATIONS?: NO

## 2025-02-20 SDOH — ECONOMIC STABILITY: TRANSPORTATION INSECURITY
IN THE PAST 12 MONTHS, HAS LACK OF RELIABLE TRANSPORTATION KEPT YOU FROM MEDICAL APPOINTMENTS, MEETINGS, WORK OR FROM GETTING THINGS NEEDED FOR DAILY LIVING?: NO

## 2025-02-20 ASSESSMENT — PAIN DESCRIPTION - PAIN TYPE
TYPE: SURGICAL PAIN
TYPE: ACUTE PAIN;CHRONIC PAIN
TYPE: SURGICAL PAIN

## 2025-02-20 ASSESSMENT — FIBROSIS 4 INDEX: FIB4 SCORE: 1.35

## 2025-02-20 ASSESSMENT — PATIENT HEALTH QUESTIONNAIRE - PHQ9
1. LITTLE INTEREST OR PLEASURE IN DOING THINGS: NOT AT ALL
2. FEELING DOWN, DEPRESSED, IRRITABLE, OR HOPELESS: NOT AT ALL
SUM OF ALL RESPONSES TO PHQ9 QUESTIONS 1 AND 2: 0

## 2025-02-20 ASSESSMENT — LIFESTYLE VARIABLES
DOES PATIENT WANT TO STOP DRINKING: NO
ON A TYPICAL DAY WHEN YOU DRINK ALCOHOL HOW MANY DRINKS DO YOU HAVE: 1
TOTAL SCORE: 0
EVER HAD A DRINK FIRST THING IN THE MORNING TO STEADY YOUR NERVES TO GET RID OF A HANGOVER: NO
AVERAGE NUMBER OF DAYS PER WEEK YOU HAVE A DRINK CONTAINING ALCOHOL: 0
HOW MANY TIMES IN THE PAST YEAR HAVE YOU HAD 5 OR MORE DRINKS IN A DAY: 0
EVER FELT BAD OR GUILTY ABOUT YOUR DRINKING: NO
TOTAL SCORE: 0
ALCOHOL_USE: NO
TOTAL SCORE: 0
CONSUMPTION TOTAL: NEGATIVE
HAVE PEOPLE ANNOYED YOU BY CRITICIZING YOUR DRINKING: NO
HAVE YOU EVER FELT YOU SHOULD CUT DOWN ON YOUR DRINKING: NO

## 2025-02-20 ASSESSMENT — COGNITIVE AND FUNCTIONAL STATUS - GENERAL
DRESSING REGULAR LOWER BODY CLOTHING: A LOT
DRESSING REGULAR UPPER BODY CLOTHING: A LITTLE
MOBILITY SCORE: 17
SUGGESTED CMS G CODE MODIFIER DAILY ACTIVITY: CK
MOVING TO AND FROM BED TO CHAIR: A LITTLE
HELP NEEDED FOR BATHING: A LITTLE
WALKING IN HOSPITAL ROOM: A LITTLE
CLIMB 3 TO 5 STEPS WITH RAILING: A LOT
MOVING FROM LYING ON BACK TO SITTING ON SIDE OF FLAT BED: A LITTLE
TURNING FROM BACK TO SIDE WHILE IN FLAT BAD: A LITTLE
SUGGESTED CMS G CODE MODIFIER MOBILITY: CK
DAILY ACTIVITIY SCORE: 19
TOILETING: A LITTLE
STANDING UP FROM CHAIR USING ARMS: A LITTLE

## 2025-02-20 ASSESSMENT — SOCIAL DETERMINANTS OF HEALTH (SDOH)

## 2025-02-20 ASSESSMENT — PAIN SCALES - GENERAL: PAIN_LEVEL: 1

## 2025-02-20 NOTE — ANESTHESIA PROCEDURE NOTES
Peripheral Block    Date/Time: 2/20/2025 3:18 PM    Performed by: Ronal Cutler M.D.  Authorized by: Rnoal Cutler M.D.    Patient Location:  Pre-op  Start Time:  2/20/2025 3:18 PM  End Time:  2/20/2025 3:23 PM  Reason for Block: at surgeon's request and post-op pain management ONLY    patient identified, IV checked, site marked, risks and benefits discussed, surgical consent, monitors and equipment checked, pre-op evaluation and timeout performed    Patient Position:  Supine  Prep: ChloraPrep    Monitoring:  Heart rate, continuous pulse ox and cardiac monitor  Block Region:  Lower Extremity  Lower Extremity - Block Type:  Selective FEMORAL nerve block at the Adductor Canal    Laterality:  Right  Procedures: ultrasound guided  Image captured, interpreted and electronically stored.  Local Infiltration:  Lidocaine  Strength:  1 %  Dose:  3 ml  Block Type:  Single-shot  Needle Length:  100mm  Needle Gauge:  21 G  Needle Localization:  Ultrasound guidance  Ultrasound picture in chart  Injection Assessment:  Negative aspiration for heme, no paresthesia on injection, incremental injection and local visualized surrounding nerve on ultrasound  Evidence of intravascular injection: No     Pt Id'ed, greeted + discussed GETA and option of nerve block.  Pt accepted and agreed to proceed.  Consent signed.  Time out done.  Monitors.  Chloroprep.  NC O2,  Ultrasound:  Needle tip, femoral artery, nerve, vein, perineural local anesthetic spread visualized.  Patient awake and talking throughout.  No complaints.  No apparent complications

## 2025-02-20 NOTE — ANESTHESIA PREPROCEDURE EVALUATION
Case: [id number] Date/Time: 02/20/25 1545    Procedure: ROBOTIC RIGHT TOTAL KNEE ARTHROPLASTY (Right)    Diagnosis: Primary osteoarthritis of right knee [M17.11]    Pre-op diagnosis: Primary osteoarthritis of right knee [M17.11]    Location:  OR 06 / SURGERY [facility]    Surgeons: Brigido Oneal M.D.          58 yo female    P Med Hx:  Arthritis  Essential hypertension  Snoring  GERD (gastroesophageal reflux disease)  Sleep apnea  Rheumatic fever  Chickenpox  Cancer (HCC)  Fatty liver  Elevated alkaline phosphatase level  Indigestion  Dental disorder  Unspecified disorder of thyroid  Hypothyroidism  Pain  Heart burn  Seasonal allergies  Chronic nausea  Edema  Anesthesia  Anxiety disorder  Palpitations  Arrhythmia    Delayed emergence from general anesthesia  Relevant Problems   ANESTHESIA   (positive) ILYA (obstructive sleep apnea)      CARDIAC   (positive) Essential hypertension      GI   (positive) GERD (gastroesophageal reflux disease)         (positive) Fatty liver      ENDO   (positive) Postoperative hypothyroidism       Physical Exam    Airway   Mallampati: III  TM distance: >3 FB  Neck ROM: full       Cardiovascular - normal exam  Rhythm: regular  Rate: normal  (-) murmur     Dental - normal exam           Pulmonary - normal exam  Breath sounds clear to auscultation     Abdominal   (+) obese     Neurological - normal exam                   Anesthesia Plan    ASA 3   ASA physical status 3 criteria: morbid obesity - BMI greater than or equal to 40    Plan - general and peripheral nerve block     Peripheral nerve block will be post-op pain control  Airway plan will be ETT          Induction: intravenous    Postoperative Plan: Postoperative administration of opioids is intended.    Pertinent diagnostic labs and testing reviewed    Informed Consent:    Anesthetic plan and risks discussed with patient.    Use of blood products discussed with: patient whom consented to blood products.

## 2025-02-20 NOTE — LETTER
January 22, 2025    Patient Name: Luann Morales  Surgeon Name: Brigido Oneal M.D.  Surgery Facility: [facility] ([address] [facility])  Surgery Date: 2/20/2025    The time of your surgery is not final and may change up to and until the day of your surgery. You will be contacted 24-48 hours prior to your surgery date with your check-in and surgery time.    If you will not be at one of the below numbers please call the surgery scheduler at [phone]  Preferred Phone: [phone]    BEFORE YOUR SURGERY   Pre Registration and/or Lab Work must be done within and no earlier than 28 days prior to your surgery date. Your scheduled facility will contact you regarding all required preregistration and/or lab work. If you have not been contacted within 7 days of your scheduled procedure please call [facility] at [phone] for an appointment as soon as possible.    Pre op Appointment:   Date: 2.12.25   Time: 8:30 AM   Provider: Brigido Oneal MD   Location: [address] [place] [id number]  Instructions: Bring a list of all medications you are taking including the dosing and frequency.    DAY OF YOUR SURGERY  Nothing to eat or drink after midnight     Refrain from smoking any substance after midnight prior to surgery. Smoking may interfere with the anesthetic and frequently produces nausea during the recovery period.    Continue taking all lifesaving medications. Including the morning of your surgery with small sip of water.    Please do NOT take on the day of surgery:  Diuretics: examples- furosemide (Lasix), spironolactone, hydrochlorothiazide  ACE-inhibitors: examples- lisinopril, ramipril, enalapril  “ARBs”: examples- losartan, Olmesartan, valsartan    Please arrive at the hospital/surgery center at the check-in time provided.     An adult will need to bring you and take you home after your surgery.     AFTER YOUR SURGERY  Post op  Appointment:   Date: 3.5.25   Time: 10:00 AM   With: Brigido Oneal MD   Location: [address] [place] [id number]    - Therapy- Your first appointment should be 5  day(s) after your surgery. For your convenience we have 4 Physical Therapy locations: [facility], [place], and [facility]. Call our office ASAP to schedule an appointment at [phone] or take the enclosed Therapy Prescription to a facility of your choice.  - No dental work for 3-6 months after your surgery.  - Post Surgery - You will need someone to drive you home  - Post Surgery - You will need someone to stay with you for 24 hours     TIME OFF WORK  FMLA or Disability forms can be faxed directly to: [phone] or you may drop them off at [address] [place] [facility] [address]. Our office charges a $35.00 fee per form. Forms will be completed within 10 business days of drop off and payment received. For the status of your forms you may contact our disability office directly [phone].    MEDICATION INSTRUCTIONS **Please read section completely**  The following medications should be stopped a minimum of 10 days prior to surgery:  All over the counter, Supplements & Herbal medications    Anorectics: Phentermine (Adipex-P, Lomaira and Suprenza), Phentermine-topiramate (Qsymia), Bupropion-naltrexone (Contrave)    **If you are on Bupropion for anxiety/depression, please continue this medication up until the day of surgery.     Opiod Partial Agonists/Opioid Antagonists: Buprenorphine (Suboxone, Belbuca, Butrans, Probuphine Implant, Sublocade), Naltrexone (ReVia, Vivitrol), Naloxone    Amphetamines: Dextroamphetamine/Amphetamine (Adderall, Mydayis), Methylphenidate Hydrochloride (Concerta, Metadate, Methylin, Ritalin)    The following medications should be stopped 5 days prior to surgery:  Blood Thinners: Any Aspirin, Aspirin products, anti-inflammatories such as ibuprofen and any blood thinners such as Coumadin and Plavix. Please  consult your prescribing physician if you are on life saving blood thinners, in regards to when to stop medications prior to surgery.     The following medications should be stopped a minimum of 3 days prior to surgery:  PDE-5 inhibitors: Sildenafil (Viagra), Tadalafil (Cialis), Vardenafil (Levitra), Avanafil (Stendra)    MAO Inhibitors: Rasagiline (Azilect), Selegiline (Eldepryl, Emsam, Selapar), Isocarboxazid (Marplan), Phenelzine (Nardil)

## 2025-02-20 NOTE — ANESTHESIA PROCEDURE NOTES
Airway    Date/Time: 2/20/2025 3:43 PM    Performed by: Ronal Cutler M.D.  Authorized by: Ronal Cutler M.D.    Location:  OR  Urgency:  Elective  Indications for Airway Management:  Anesthesia      Spontaneous Ventilation: absent    Sedation Level:  Deep  Preoxygenated: Yes    Patient Position:  Sniffing  Mask Difficulty Assessment:  1 - vent by mask  Final Airway Type:  Endotracheal airway  Final Endotracheal Airway:  ETT  Cuffed: Yes    Technique Used for Successful ETT Placement:  Direct laryngoscopy    Insertion Site:  Oral  Blade Type:  Camille  Laryngoscope Blade/Videolaryngoscope Blade Size:  3  ETT Size (mm):  7.0  Measured from:  Teeth  ETT to Teeth (cm):  20  Placement Verified by: auscultation and capnometry    Cormack-Lehane Classification:  Grade IIa - partial view of glottis  Number of Attempts at Approach:  1

## 2025-02-20 NOTE — LETTER
2025    To Whom it May Concern: Sukhwinder Ricardo MD  Re: PCP Pre-Op Medical Clearance    This patient, Luann Morales, : 1965, will be undergoing surgery at the [facility].      The anesthesiologist is requesting risk stratification and/ or medication instructions from your office.     Please respond with the following recommendations listed below so the patient may proceed with the planned procedure.    Procedure: Left total knee arthroplasty     The patient's risk level is: [] Low  [] Intermediate  [] High for the listed procedure.     Please provide additional information or instructions below if necessary:    ________________________________________________________________    The procedure may be performed in:     [facility]:    [] Yes [] No    ________________________________________________________________    ________________________________________________________________    OR    Hospital:         [] Yes [] No    ________________________________________________________________    ________________________________________________________________      OR      []  I cannot release the patient to undergo the listed procedure. (Please provide a reason.)    ________________________________________________________________    ________________________________________________________________    ________________________________________________________________      Pre-Op Medical Clearance is required for this patient prior to scheduling surgery at our Ambulatory Surgery Center.      Additional comments:  EKG noted new QT prolongation - Copy of EKG attached       Please fax the Pre-Op Clearance to: [phone]  Please call with additional questions to: [phone]      Clearance issued on: _____________________    Signature: ____________________________________________________    Regards,    [facility]

## 2025-02-20 NOTE — H&P
Surgery Orthopedic History & Physical Note    Date  2/20/2025    Primary Care Physician  Sukhwinder Ricardo III, M.D.    CC  Pre-Op Diagnosis Codes:      * Primary osteoarthritis of right knee [M17.11]    HPI  This is a 59 y.o. female who presented with right knee pain and DJD    Past Medical History:   Diagnosis Date    Anemia     history of    Anesthesia     slow to wake up    Anxiety disorder     Arrhythmia     Its been years.  Said my heart is very.good.    Arthritis      knees    Cancer (HCC) 1999    thyroid ca-thyroidectomy. Radiation tx. No chemo.    Chickenpox as child    Chronic nausea     Delayed emergence from general anesthesia     Dental disorder     upper/lower dentures    Edema 10/2021    to ankles bilateral. 12/27/21-continues, followed by PCP.    Elevated alkaline phosphatase level     Essential hypertension     Fatty liver     Fatty liver     GERD (gastroesophageal reflux disease)     Heart burn     taking omeprazole    Hypothyroidism     Indigestion     taking omeprazole    Pain 07/28/2017    left wrist, 6/10    Pain     right    Palpitations     Rheumatic fever as child    Seasonal allergies     Sleep apnea     CPAP with Oxygen 2L @ night    Snoring     no sleep study    Unspecified disorder of thyroid 1999    thyroid cancer w/ radiation/surgery    Urinary bladder disorder     overactive bladder, takes medication       Past Surgical History:   Procedure Laterality Date    PB KNEE SCOPE,DIAGNOSTIC Left 06/06/2022    Procedure: ARTHROSCOPY, KNEE;  Surgeon: Davion Reyes M.D.;  Location: SURGERY [facility];  Service: Orthopedics    MENISCECTOMY, KNEE, MEDIAL Left 06/06/2022    Procedure: MENISCECTOMY, KNEE, MEDIAL/LATERAL - PARTIAL;  Surgeon: Davion Reyes M.D.;  Location: [facility];  Service: Orthopedics    ID COLONOSCOPY-FLEXIBLE  01/10/2022    Procedure: COLONOSCOPY;  Surgeon: Francisco Chaudhary M.D.;  Location: [facility];  Service: Gastroenterology    ID UPPER GI  ENDOSCOPY,DIAGNOSIS  01/10/2022    Procedure: GASTROSCOPY;  Surgeon: Francisco Chaudhary M.D.;  Location: SURGERY [facility];  Service: Gastroenterology    COLONOSCOPY  2018    with EGD    SHOULDER ARTHROSCOPY Left 2018    WRIST ARTHROSCOPY Left 2017    Procedure: WRIST ARTHROSCOPY TRIANGULAR FIBROCARTILAGE COMPLEX DEBRIDEMENT;  Surgeon: Davion Grey M.D.;  Location: SURGERY [facility];  Service:     LIGAMENT REPAIR  2017    Procedure: LIGAMENT REPAIR ULNOTRIQUETRAL ;  Surgeon: Davion Grey M.D.;  Location: SURGERY [facility];  Service:     BREAST RECONSTRUCTION  2016    reduction    HYSTEROSCOPY WITH VIDEO OPERATIVE  2014    Performed by Pepe Deleon M.D. at SURGERY SAME DAY [facility]    DILATION AND CURETTAGE  2014    Performed by Pepe Deleon M.D. at SURGERY SAME DAY [facility]    EGD W/ENDOSCOPIC ULTRASOUND  2009    Performed by YARELI ANDERS at SURGERY [facility]    ANIBAL BY LAPAROSCOPY      FUSION, SPINE, LUMBAR, PLIF  2001    THYROIDECTOMY TOTAL  2000    WRIST EXPLORATION Right 1998    right; excision bone    PRIMARY C SECTION  ,         TUBAL COAGULATION LAPAROSCOPIC BILATERAL Bilateral        No current facility-administered medications for this encounter.       Social History     Socioeconomic History    Marital status:      Spouse name: Not on file    Number of children: Not on file    Years of education: Not on file    Highest education level: Not on file   Occupational History    Not on file   Tobacco Use    Smoking status: Former     Current packs/day: 0.00     Average packs/day: 0.5 packs/day for 5.0 years (2.5 ttl pk-yrs)     Types: Cigarettes     Start date: 1991     Quit date: 1996     Years since quittin.1    Smokeless tobacco: Never    Tobacco comments:        Vaping Use    Vaping status: Never Used   Substance and Sexual Activity    Alcohol use: Yes      Comment: 2-3 times a year    Drug use: Never    Sexual activity: Not on file   Other Topics Concern    Not on file   Social History Narrative    Not on file     Social Drivers of Health     Financial Resource Strain: Not on file   Food Insecurity: Not on file   Transportation Needs: Not on file   Physical Activity: Not on file   Stress: Not on file   Social Connections: Not on file   Intimate Partner Violence: Not on file   Housing Stability: Not on file       Family History   Problem Relation Age of Onset    Hypertension Mother     Heart Attack Father     Heart Disease Father         Heart attack    Alzheimer's Disease Father     Suicide Attempts Brother     Drug abuse Brother     Diabetes Maternal Grandmother         Borderline    Colon Cancer Maternal Grandfather     Alcohol abuse Sister         Pp    Cancer Maternal Uncle     Cancer Maternal Aunt     Stroke Maternal Uncle     Sleep Apnea Neg Hx        Allergies  Bactrim [sulfamethoxazole w-trimethoprim], Diclofenac, Doxycycline, Hydrocodone, Sulfamethizole, Tetracycline, Tramadol, Zpack [azithromycin], and Sulfamethoxazole    Review of Systems  Negative    Physical Exam    Vital Signs  Blood Pressure: 130/82       Pulse: (!) 51       Pulse Oximetry: 97 %     Alert/oriented  No labored breathing  Heart - regular    Labs:      Radiology:  No orders to display     Assessment/Plan:  Pre-Op Diagnosis Codes:      * Primary osteoarthritis of right knee [M17.11]  Procedure(s):  ROBOTIC RIGHT TOTAL KNEE ARTHROPLASTY

## 2025-02-21 VITALS
BODY MASS INDEX: 45.84 KG/M2 | HEIGHT: 64 IN | RESPIRATION RATE: 16 BRPM | DIASTOLIC BLOOD PRESSURE: 58 MMHG | WEIGHT: 268.52 LBS | SYSTOLIC BLOOD PRESSURE: 106 MMHG | HEART RATE: 60 BPM | TEMPERATURE: 97.8 F | OXYGEN SATURATION: 94 %

## 2025-02-21 PROCEDURE — G0378 HOSPITAL OBSERVATION PER HR: HCPCS

## 2025-02-21 PROCEDURE — A9270 NON-COVERED ITEM OR SERVICE: HCPCS | Performed by: ORTHOPAEDIC SURGERY

## 2025-02-21 PROCEDURE — 97535 SELF CARE MNGMENT TRAINING: CPT

## 2025-02-21 PROCEDURE — 97162 PT EVAL MOD COMPLEX 30 MIN: CPT

## 2025-02-21 PROCEDURE — 51798 US URINE CAPACITY MEASURE: CPT

## 2025-02-21 PROCEDURE — 700102 HCHG RX REV CODE 250 W/ 637 OVERRIDE(OP): Performed by: ORTHOPAEDIC SURGERY

## 2025-02-21 PROCEDURE — 99024 POSTOP FOLLOW-UP VISIT: CPT | Performed by: ORTHOPAEDIC SURGERY

## 2025-02-21 RX ORDER — OXYCODONE HYDROCHLORIDE 5 MG/1
2.5-5 TABLET ORAL EVERY 4 HOURS PRN
Qty: 38 TABLET | Refills: 0 | Status: SHIPPED | OUTPATIENT
Start: 2025-02-21 | End: 2025-02-28

## 2025-02-21 RX ORDER — PSEUDOEPHEDRINE HCL 30 MG
100 TABLET ORAL 2 TIMES DAILY
Qty: 20 CAPSULE | Refills: 2 | Status: SHIPPED | OUTPATIENT
Start: 2025-02-21

## 2025-02-21 RX ADMIN — DOCUSATE SODIUM 100 MG: 100 CAPSULE, LIQUID FILLED ORAL at 05:25

## 2025-02-21 RX ADMIN — ACETAMINOPHEN 650 MG: 325 TABLET ORAL at 05:23

## 2025-02-21 RX ADMIN — LEVOTHYROXINE SODIUM 137 MCG: 0.14 TABLET ORAL at 05:24

## 2025-02-21 RX ADMIN — OXYCODONE 5 MG: 5 TABLET ORAL at 10:08

## 2025-02-21 RX ADMIN — OXYCODONE 5 MG: 5 TABLET ORAL at 13:17

## 2025-02-21 RX ADMIN — ACETAMINOPHEN 650 MG: 325 TABLET ORAL at 12:38

## 2025-02-21 RX ADMIN — SERTRALINE HYDROCHLORIDE 100 MG: 50 TABLET ORAL at 05:24

## 2025-02-21 RX ADMIN — OXYBUTYNIN CHLORIDE 5 MG: 5 TABLET, EXTENDED RELEASE ORAL at 05:25

## 2025-02-21 RX ADMIN — SERTRALINE HYDROCHLORIDE 25 MG: 50 TABLET ORAL at 05:27

## 2025-02-21 RX ADMIN — OMEPRAZOLE 40 MG: 20 CAPSULE, DELAYED RELEASE ORAL at 05:23

## 2025-02-21 RX ADMIN — ASPIRIN 81 MG: 81 TABLET, COATED ORAL at 06:25

## 2025-02-21 ASSESSMENT — GAIT ASSESSMENTS
GAIT LEVEL OF ASSIST: STANDBY ASSIST
ASSISTIVE DEVICE: FRONT WHEEL WALKER
DISTANCE (FEET): 150
DEVIATION: ANTALGIC;STEP TO

## 2025-02-21 ASSESSMENT — COGNITIVE AND FUNCTIONAL STATUS - GENERAL
WALKING IN HOSPITAL ROOM: A LITTLE
STANDING UP FROM CHAIR USING ARMS: A LITTLE
MOBILITY SCORE: 21
CLIMB 3 TO 5 STEPS WITH RAILING: A LITTLE
SUGGESTED CMS G CODE MODIFIER MOBILITY: CJ

## 2025-02-21 ASSESSMENT — PAIN DESCRIPTION - PAIN TYPE
TYPE: ACUTE PAIN;SURGICAL PAIN
TYPE: SURGICAL PAIN

## 2025-02-21 NOTE — PROGRESS NOTES
Pt discharged to home. Discharge instructions provided to pt. Pt verbalizes understanding. Pt states all questions have been answered. Signed copy in chart. Prescriptions sent to outside pharmacy. Pt states that all personal belongings are in possession. Pt off unit via wheelchair, escorted by transport.

## 2025-02-21 NOTE — CARE PLAN
The patient is Stable - Low risk of patient condition declining or worsening    Shift Goals  Clinical Goals: Pain at tolerable level 5/10 or less, safety, able to ambulate, void, IS  Patient Goals: pain control, comfort    Progress made toward(s) clinical / shift goals:  pt pain 4/10, scheduled tylenol given per mar, IS 1,000, pt encouraged to ambulate but refused 3x,  after midnight pt ambulated 90 ft x2, voided. Remained free from falls throughout this shift    Patient is not progressing towards the following goals:

## 2025-02-21 NOTE — OR NURSING
1655 - Pt to PACU from OR. Report from anesthesia and OR RN. On 6L O2 via mask. Respirations even and unlabored. VSS. Dressing to rt knee CDI. Pillow placed under operative lower extremity ankle only, knee of [place] flat.    1720 - Pt woke up, states pain is tolerable. Pt back to sleep.    1747 - Pt medicated for high blood pressure and nausea per MAR.    1800 - pt medicated for high blood pressure and nausea per MAR.    1812 - Report given to Chan Carter GSU. All questions answered, verbalizes understanding. Patient transport request placed.     1817 -  Pt transported back to room via [facility] with 3 oxygen, transport monitor, all personal belongings. Accompanied by RN.

## 2025-02-21 NOTE — PROGRESS NOTES
Pt admitted to 216 from PACU. Pt able to ambulate 3 ft from gurney to bed. Pt stated pain 8/10, waiting for medication to be approved, pt verbalized understanding. No nausea at this time. Numbness and tingling to RLE. Dressing is CDI. No other needs at this time, call light in reach, bed in lowest position.

## 2025-02-21 NOTE — THERAPY
Physical Therapy   Initial Evaluation     Patient Name: Luann Morales  Age:  59 y.o., Sex:  female  Medical Record #: [id number]  Today's Date: 2/21/2025     Precautions  Precautions: (P) Weight Bearing As Tolerated Right Lower Extremity    Assessment  Patient is 59 y.o. female who was seen s/p right TKA with WBAT orders for right lower extremity. Pt was agreeable to therapy evaluation and presented with safe upright functional mobility with AD use after therapy session. Pt was provided with education on elevation, icing, positioning, and supine/seated therapeutic exercises. Pt was able to return demo safe gait mechanics with use of AD on flat level surfaces with appropriate step to gait. Pt was able to return demo safe mechanics in order to navigate stairs with use of FWW. Pt was able to demonstrate safe use of AD during transfers/transitions and general locomotion with no corona LOB. Pt was primarily limited by pain and demonstrated with slight bucking. Pt encouraged to maintain step to mechanics to avoid buckling and reduce risk of falls.  Pt has no acute skilled PT needs at this time, anticipate pt to d/c home once medically clear with recs for FWW use and OP therapy services.     The pt was provided with the following skilled therapy txt: Pt was provided with VC, demo, and facilitation during gait training in order to return demo step to gait mechanics to improve stability and reduce risk of buckling/falls. Pt was provided with demo and VC to go up/down steps with safe mechanics with use of AD. Pt was provided with VC and TC for appropriate quad contraction and mechanics during supine/seated therapeutic exercises. Pt was provided with education on intensity, frequency, and duration of exercises.    Plan    Physical Therapy Initial Treatment Plan   Duration: (P) Evaluation only    DC Equipment Recommendations: (P) Front-Wheel Walker  Discharge Recommendations: (P) Recommend outpatient physical therapy services  to address higher level deficits     Objective       02/21/25 1058   Initial Contact Note    Initial Contact Note Order Received and Verified, Evaluation Only - Patient Does Not Require Further Acute Physical Therapy at this Time.  However, May Benefit from Post Acute Therapy for Higher Level Functional Deficits.   Precautions   Precautions Weight Bearing As Tolerated Right Lower Extremity   Pain 0 - 10 Group   Location Leg;Knee   Location Orientation Right   Description Aching;Constant;Intermittent;Squeezing   Therapist Pain Assessment Prior to Activity;During Activity;Post Activity;Nurse Notified;6   Prior Living Situation   Prior Services None   Housing / Facility 2 Story House   Steps Into Home 2   Steps In Home 16   Rail Left Rail  (Steps into Home);Left Rail (Steps in Home)   Equipment Owned 4-Wheel Walker   Lives with - Patient's Self Care Capacity Adult Children;Related Adult   Comments pt states grand dtr and dtr will be able to assist upon d/c to home   Prior Level of Functional Mobility   Bed Mobility Independent   Transfer Status Independent   Ambulation Independent   Ambulation Distance   (community)   Assistive Devices Used None   Stairs Independent   History of Falls   History of Falls No   Cognition    Cognition / Consciousness WDL   Level of Consciousness Alert   Passive ROM Upper Body   Passive ROM Upper Body WDL   Active ROM Upper Body   Active ROM Upper Body  WDL   Strength Upper Body   Upper Body Strength  WDL   Sensation Upper Body   Upper Extremity Sensation  WDL   Upper Body Muscle Tone   Upper Body Muscle Tone  WDL   Passive ROM Lower Body   Passive ROM Lower Body X   Comments limited due to pain, able to demo 0-70 deg of R knee ROM   Active ROM Lower Body    Active ROM Lower Body  X   Comments limited in R knee due to pain, able to demo 0-60   Strength Lower Body   Lower Body Strength  X   Comments limited due to pain, able to demo functional strength for B LE   Sensation Lower Body   Lower  Extremity Sensation   WDL   Lower Body Muscle Tone   Lower Body Muscle Tone  WDL   Neurological Concerns   Neurological Concerns No   Coordination Upper Body   Coordination WDL   Coordination Lower Body    Coordination Lower Body  WDL   Balance Assessment   Sitting Balance (Static) Good   Sitting Balance (Dynamic) Fair +   Standing Balance (Static) Fair +   Standing Balance (Dynamic) Fair +   Weight Shift Sitting Good   Weight Shift Standing Fair   Comments w/fww use, no corona LOB noted   Bed Mobility    Supine to Sit Standby Assist   Sit to Supine Standby Assist   Scooting Standby Assist   Gait Analysis   Gait Level Of Assist Standby Assist   Assistive Device Front Wheel Walker   Distance (Feet) 150   # of Times Distance was Traveled 1   Deviation Antalgic;Step To   # of Stairs Climbed 2   Level of Assist with Stairs Standby Assist   Weight Bearing Status WBAT R LE   Comments has slight buckling, however, with VC to offloading R LE pt was able to demo improved gait mechancis   Functional Mobility   Sit to Stand Standby Assist   Bed, Chair, Wheelchair Transfer Standby Assist   Transfer Method Stand Step   Mobility sit<>stand, ambulation, stairs, transfer back to chair   6 Clicks Assessment - How much HELP from from another person do you currently need... (If the patient hasn't done an activity recently, how much help from another person do you think he/she would need if he/she tried?)   Turning from your back to your side while in a flat bed without using bedrails? 4   Moving from lying on your back to sitting on the side of a flat bed without using bedrails? 4   Moving to and from a bed to a chair (including a wheelchair)? 4   Standing up from a chair using your arms (e.g., wheelchair, or bedside chair)? 3   Walking in hospital room? 3   Climbing 3-5 steps with a railing? 3   6 clicks Mobility Score 21   Activity Tolerance   Sitting in Chair functional   Sitting Edge of Bed NT   Standing 10 mins   Comments limited  by pain   Edema / Skin Assessment   Edema / Skin  Not Assessed   Education Group   Education Provided Role of Physical Therapist;Exercises - Supine;Exercises - Seated;Gait Training;Stair Training;Use of Assistive Device   Role of Physical Therapist Patient Response Patient;Acceptance;Explanation;Demonstration;Verbal Demonstration;Action Demonstration   Gait Training Patient Response Patient;Acceptance;Explanation;Demonstration;Verbal Demonstration;Action Demonstration   Stair Training Patient Response Patient;Acceptance;Explanation;Demonstration;Verbal Demonstration;Action Demonstration   Use of Assistive Device Patient Response Patient;Acceptance;Explanation;Demonstration;Verbal Demonstration;Action Demonstration   Exercises - Supine Patient Response Patient;Acceptance;Explanation;Demonstration;Handout;Action Demonstration;Verbal Demonstration   Exercises - Seated Patient Response Patient;Acceptance;Explanation;Demonstration;Handout;Verbal Demonstration;Action Demonstration   Physical Therapy Initial Treatment Plan    Duration Evaluation only   Anticipated Discharge Equipment and Recommendations   DC Equipment Recommendations Front-Wheel Walker   Discharge Recommendations Recommend outpatient physical therapy services to address higher level deficits   Interdisciplinary Plan of Care Collaboration   IDT Collaboration with  Nursing   Patient Position at End of Therapy Seated;Call Light within Reach;Phone within Reach;Tray Table within Reach;Family / Friend in Room   Collaboration Comments aware of visit and recs   Session Information   Date / Session Number  2/21 eval only

## 2025-02-21 NOTE — DIETARY
NUTRITION SERVICES: BMI - Pt with BMI >40 (=Body mass index is 46.09 kg/m².), morbid obesity. Weight loss counseling not appropriate in acute care setting.     RECOMMEND - If appropriate at DC please refer to outpatient nutrition services for weight management.

## 2025-02-21 NOTE — OP REPORT
DATE OF PROCEDURE: 2/20/2025    PREOPERATIVE DIAGNOSIS: Degenerative joint disease, right knee    POSTOPERATIVE DIAGNOSIS: Degenerative joint disease, right knee    PROCEDURE: Right total knee arthroplasty, robotic-assist    SURGEON: Brigido Oneal MD    ASSISTANT: Neel Macias PA-C     ANESTHESIA: Ronal Cutler MD    ANESTHETIC: General plus preoperative adductor canal block for postoperative pain management    EBL:  350 cc    TOURNIQUET TIME:  2 minutes    IMPLANTS:     1.  [place] triathlon CR press-fit size 2 femur  2.  [place] TriTanium size 3 tibia  3.  Triathlon X3 size 3 CS polyethylene insert, 11 mm    JUANITA FINDINGS:    1.  Preoperative extension - 11 degrees hyperextension  2.  Preoperative resting coronal plane alignment - 7 degrees valgus  3.  Postoperative extension - 3 degrees hyperextension  4.  Postoperative resting coronal plane alignment - 3.5 degrees valgus  5.  Postoperative flexion - 129 degrees    INDICATIONS: Luann has severe arthritis in both knees and has failed conservative treatment including Tylenol, physical therapy, intra-articular injection for the right knee and arthroscopy for the left knee.  She has persistent activity-limiting pain and wishes to proceed with staged total knee arthroplasties with the right side first.  Risks include bleeding, infection, neurovascular injury, pain, stiffness, fracture, instability, implant failure, thromboembolic phenomenon, anesthetic and medical complications etc.    PROCEDURE DESCRIPTION: The patient was identified in the preoperative holding area and her right leg was marked.  Adductor canal block was given.  She was taken to the operating room and placed supine on the operating table.  General anesthetic was administered.  Intravenous antibiotics and tranexamic acid was given.  Nonsterile tourniquet was placed on the right thigh and the right leg was prepped and draped in sterile fashion.  A timeout was held to confirm the  patient's identity and correct surgical site.    Esmarch bandage was used to exsanguinate the limb.  Tourniquet was inflated to 250 mmHg.  Longitudinal incision made on the anterior aspect of the knee.  Median parapatellar arthrotomy was performed.  We had a venous tourniquet so the tourniquet was deflated.  Subperiosteal soft tissue sleeve was elevated off the proximal medial tibia with limited medial release.  Infrapatellar fat pad was excised.  Patella was everted, chondral surface was in good condition so we did not resurface.  Small osteophytes were removed and a circumferential soft tissue release with partial lateral facetectomy was performed.  The knee was flexed.  Inflamed synovial tissue was excised from the anterior surface of the distal femoral metaphysis.    We then placed [employer] pins into the anteromedial distal femur and anteromedial proximal tibia and attached the sensors.  Hip and ankle center were marked in standard fashion.  Distal femur and proximal tibia were then mapped.  Some lateral femoral osteophytes were removed.  The knee was ranged.  We then checked joint gap medially and laterally with varus and valgus stress in both flexion and extension and modified our virtual component position on the computer.  Once we had adequate balance planned, the [employer] robot arm was used to guide osteotomy of the proximal tibia and distal femur.  Bone fragments were removed.    Residual medial and lateral menisci were excised.  Local anesthetic mixture was injected in the posterior capsular tissues.  Dilute Betadine soak was performed.  This was irrigated from the wound.  We placed the femoral and tibial trials and found good range of motion and stability with an 11 mm trial insert (see findings above).  Tibial rotation was marked.  The knee was flexed.  Femoral pegs were prepared and this trial was removed.  Hermes sensors and pins were removed.  The tibia was pinned in place and the proximal tibia was then  prepared.  This trial was then removed.    Tibial and then femoral components were placed and we trialed with an 11 mm insert which had similar findings.  This trial was removed and the 11 mm polyethylene was subsequently inserted in standard fashion.  The knee was irrigated.  Arthrotomy was closed with #2 PDS Quill from the joint level proximal and with 1-0 Vicryl distally.  Skin was closed with 2-0 Vicryl and 3-0 Monocryl.  Steri-Strips were applied followed by gauze and Tegaderms.  The patient was extubated and taken to recovery in stable condition.     POSTOPERATIVE PLAN:     1.  Weightbearing as tolerated with walker  2.  DVT prophylaxis with early mobilization and aspirin 81 mg twice a day for 4 weeks starting on postoperative day #1  3.  Wound check and baseline radiographs in approximately 2 weeks

## 2025-02-21 NOTE — ANESTHESIA TIME REPORT
Anesthesia Start and Stop Event Times       Date Time Event    2/20/2025 1436 Ready for Procedure     1534 Anesthesia Start     1700 Anesthesia Stop          Responsible Staff  02/20/25      Name Role Begin End    Ronal Cutler M.D. Anesth 1534 1700          Overtime Reason:  no overtime (within assigned shift)    Comments:

## 2025-02-21 NOTE — PROGRESS NOTES
Received bedside report from morning RN, resumed care of pt. Pt alert and oriented x4, resting comfortably in bed, resp even and unlabored no s/s of resp distress. No complaints at this time, no report of pain/discomfort. Safety measures in place, treaded socks on, bed alarm on. Call light within reach. Hourly rounding continue

## 2025-02-21 NOTE — PROGRESS NOTES
4 Eyes Skin Assessment Completed by Celina, RN and Vaughn RN.    Head WDL  Ears WDL  Nose WDL  Mouth WDL  Neck WDL  Breast/Chest WDL  Shoulder Blades WDL  Spine/back scar  (R) Arm/Elbow/Hand WDL  (L) Arm/Elbow/Hand WDL  Abdomen WDL  Groin WDL  Scrotum/Coccyx/Buttocks Redness, Blanching, Moisture Fissure, and Discoloration  (R) Leg/ R-knee GEOFF drsg in place  (L) Leg WDL  (R) Heel/Foot/Toe WDL  (L) Heel/Foot/Toe WDL          Devices In Places Blood Pressure Cuff, Pulse Ox, and SCD's      Interventions In Place Gray Ear Foams and Pillows    Possible Skin Injury Yes    Pictures Uploaded Into Epic Yes  Wound Consult Placed Yes  RN Wound Prevention Protocol Ordered No

## 2025-02-21 NOTE — PROGRESS NOTES
POD#1  Pain controlled  AVSS  + DF/PF  Dressing dry  Calf NT  Mobilize  PT/OT  WBAT w/walker  ASA BID x 4 weeks  D/c home when clears PT

## 2025-04-22 ENCOUNTER — OFFICE VISIT (OUTPATIENT)
Dept: MEDICAL GROUP | Facility: PHYSICIAN GROUP | Age: 60
End: 2025-04-22
Payer: MEDICARE

## 2025-04-22 VITALS
DIASTOLIC BLOOD PRESSURE: 82 MMHG | TEMPERATURE: 97.2 F | WEIGHT: 270 LBS | HEART RATE: 58 BPM | BODY MASS INDEX: 46.1 KG/M2 | OXYGEN SATURATION: 96 % | SYSTOLIC BLOOD PRESSURE: 124 MMHG | RESPIRATION RATE: 12 BRPM | HEIGHT: 64 IN

## 2025-04-22 DIAGNOSIS — Z98.890 S/P KNEE SURGERY: ICD-10-CM

## 2025-04-22 DIAGNOSIS — R10.9 ACUTE RIGHT FLANK PAIN: ICD-10-CM

## 2025-04-22 DIAGNOSIS — J30.2 SEASONAL ALLERGIES: ICD-10-CM

## 2025-04-22 PROCEDURE — 99214 OFFICE O/P EST MOD 30 MIN: CPT | Performed by: PHYSICIAN ASSISTANT

## 2025-04-22 PROCEDURE — 3074F SYST BP LT 130 MM HG: CPT | Performed by: PHYSICIAN ASSISTANT

## 2025-04-22 PROCEDURE — 3079F DIAST BP 80-89 MM HG: CPT | Performed by: PHYSICIAN ASSISTANT

## 2025-04-22 ASSESSMENT — FIBROSIS 4 INDEX: FIB4 SCORE: 1.35

## 2025-04-22 NOTE — PROGRESS NOTES
"Verbal consent was acquired by the patient to use [employer] ambient listening note generation during this visit     Subjective:     HPI:   History of Present Illness  The patient presents with right-sided abdominal pain and sinus-related symptoms.    The abdominal pain is characterized by intermittent, sudden onset, colicky pain localized to the right side, which is tender upon palpation and severe in intensity. Each episode lasts a few minutes before subsiding, and these episodes have been occurring over the past couple of days. The patient also reports intermittent nausea. There is no dysuria, hematuria, or history of nephrolithiasis. Bowel movements are regular. The patient has a history of cholecystectomy.    Sinus symptoms include otalgia, which may be attributable to allergic rhinitis or a mild sinus infection. There have been no recent upper respiratory infections. The patient experiences occasional postnasal drip upon awakening. No sinus irrigation has been utilized for allergy management; however, Fluticasone (Flonase) has been used previously.    PAST SURGICAL HISTORY:  Cholecystectomy  Arthroscopic knee surgery    Health Maintenance: Completed    ROS:  Gen: no fevers/chills  Eyes: no changes in vision  ENT: no sore throat  Pulm: no sob, no cough  CV: no chest pain  GI: no nausea/vomiting  : no dysuria  MSk: no myalgias  Skin: no rash  Neuro: no headaches  Psych: no depression, no anxiety    Objective:     Exam:  /82   Pulse (!) 58   Temp 36.2 °C (97.2 °F) (Temporal)   Resp 12   Ht 1.626 m (5' 4\")   Wt 122 kg (270 lb)   LMP 03/30/2014   SpO2 96%   BMI 46.35 kg/m²  Body mass index is 46.35 kg/m².    PHYSICAL EXAM  Constitutional: Alert, no distress, well-groomed.  Skin: Warm, dry, good turgor, no rashes in visible areas.  Eye: Equal, round and reactive, conjunctiva clear, lids normal.  ENMT: Lips without lesions, good dentition, moist mucous membranes.  Neck: Trachea midline, no masses, no " thyromegaly.  Respiratory: Unlabored respiratory effort, no cough.  MSK: Normal gait, moves all extremities.  Abdomen: bowel sounds present, soft, nondistended, tender on right flank otherwise throughout, no peritoneal signs, no CVA tenderness bilaterally, no hepatosplenomegaly  Neuro: Grossly non-focal.   Psych: Alert and oriented x3, normal affect and mood.    Results      Assessment & Plan:     1. Acute right flank pain  CT-ABDOMEN-PELVIS W/O      2. Seasonal allergies        3. S/P knee surgery            Assessment & Plan      1. Acute right flank pain  Acute.  - Possible renal calculus. Unable to give urine sample in office today.   - RUQ tenderness on exam.  - CT abdomen to confirm diagnosis and rule out other issues.  - If kidney stone confirmed, consider tamsulosin  -ER for new or worsening symptoms    - CT-ABDOMEN-PELVIS W/O; Future    2. Seasonal allergies  Reported history and exam findings are most consistent with seasonal allergies and PND. Recommended daily sinus rinses as well as Flonase once or twice daily. Patient is instructed on how to correctly use Flonase to avoid epistaxis. Patient can also try Zyrtec or Claritin as needed.     3. S/P knee surgery   Recovering.  - Weekly physical therapy.  - Continue physical therapy to build leg strength and ensure recovery. Maintain current regimen to avoid complications and further surgery.      I spent a total of 31 minutes with record review, exam, communication with the patient, communication with other providers, and documentation of this encounter.    Please note that this dictation was created using voice recognition software. I have made every reasonable attempt to correct obvious errors, but I expect that there are errors of grammar and possibly content that I did not discover before finalizing the note.

## 2025-04-25 DIAGNOSIS — I10 ESSENTIAL HYPERTENSION: ICD-10-CM

## 2025-04-25 RX ORDER — LISINOPRIL 20 MG/1
20 TABLET ORAL DAILY
Qty: 90 TABLET | Refills: 2 | Status: SHIPPED | OUTPATIENT
Start: 2025-04-25

## 2025-04-25 NOTE — TELEPHONE ENCOUNTER
Received request via: Patient    Was the patient seen in the last year in this department? Yes    Does the patient have an active prescription (recently filled or refills available) for medication(s) requested? No    Pharmacy Name:  [facility] [address] [place], [place] [address] [facility]        Does the patient have FDC Plus and need 100-day supply? (This applies to ALL medications) Yes, quantity updated to 100 days

## 2025-05-02 ENCOUNTER — HOSPITAL ENCOUNTER (OUTPATIENT)
Dept: RADIOLOGY | Facility: MEDICAL CENTER | Age: 60
End: 2025-05-02
Attending: PHYSICIAN ASSISTANT
Payer: MEDICARE

## 2025-05-02 DIAGNOSIS — R10.9 ACUTE RIGHT FLANK PAIN: ICD-10-CM

## 2025-05-02 PROCEDURE — 74176 CT ABD & PELVIS W/O CONTRAST: CPT

## 2025-05-02 PROCEDURE — 700117 HCHG RX CONTRAST REV CODE 255: Performed by: PHYSICIAN ASSISTANT

## 2025-05-02 RX ADMIN — IOHEXOL 25 ML: 240 INJECTION, SOLUTION INTRATHECAL; INTRAVASCULAR; INTRAVENOUS; ORAL at 15:58

## 2025-05-06 ENCOUNTER — RESULTS FOLLOW-UP (OUTPATIENT)
Dept: MEDICAL GROUP | Facility: PHYSICIAN GROUP | Age: 60
End: 2025-05-06

## 2025-06-11 ENCOUNTER — APPOINTMENT (OUTPATIENT)
Dept: MEDICAL GROUP | Facility: PHYSICIAN GROUP | Age: 60
End: 2025-06-11
Payer: MEDICARE

## 2025-06-11 VITALS
RESPIRATION RATE: 16 BRPM | OXYGEN SATURATION: 97 % | SYSTOLIC BLOOD PRESSURE: 128 MMHG | HEART RATE: 64 BPM | BODY MASS INDEX: 46.44 KG/M2 | WEIGHT: 272 LBS | HEIGHT: 64 IN | TEMPERATURE: 97.8 F | DIASTOLIC BLOOD PRESSURE: 74 MMHG

## 2025-06-11 DIAGNOSIS — L30.9 DERMATITIS: Primary | ICD-10-CM

## 2025-06-11 PROCEDURE — 3074F SYST BP LT 130 MM HG: CPT | Performed by: FAMILY MEDICINE

## 2025-06-11 PROCEDURE — 99213 OFFICE O/P EST LOW 20 MIN: CPT | Performed by: FAMILY MEDICINE

## 2025-06-11 PROCEDURE — 3078F DIAST BP <80 MM HG: CPT | Performed by: FAMILY MEDICINE

## 2025-06-11 RX ORDER — TRIAMCINOLONE ACETONIDE 1 MG/ML
1 LOTION TOPICAL 3 TIMES DAILY
Qty: 60 ML | Refills: 1 | Status: SHIPPED | OUTPATIENT
Start: 2025-06-11

## 2025-06-11 ASSESSMENT — FIBROSIS 4 INDEX: FIB4 SCORE: 1.35

## 2025-06-11 NOTE — PROGRESS NOTES
"Subjective:     CC: Here for a new rash.    HPI:   Luann presents today with the following medical concerns:    Dermatitis  This is a new problem.  Patient states she has had a skin rash on her forearms for the last month.  It has been persistent.  It sometimes itches.  No vesicles.  No no changes in medications or supplements.  No changes in personal hygiene products.  She has never had this before.    Past Medical History[1]    Social History[2]    Current Medications and Prescriptions Ordered in Epic[3]    Allergies:  Bactrim [sulfamethoxazole w-trimethoprim], Diclofenac, Doxycycline, Hydrocodone, Sulfamethizole, Tetracycline, Tramadol, Zpack [azithromycin], and Sulfamethoxazole    Health Maintenance: Completed    ROS:  Gen: no fevers/chills, no changes in weight  Eyes: no changes in vision  ENT: no sore throat, no hearing loss, no bloody nose  Pulm: no sob, no cough  CV: no chest pain, no palpitations  GI: no nausea/vomiting, no diarrhea  : no dysuria  MSk: no myalgias  Skin: no rash  Neuro: no headaches, no numbness/tingling  Heme/Lymph: no easy bruising      Objective:       Exam:  /74 (BP Location: Right arm, Patient Position: Sitting, BP Cuff Size: Adult)   Pulse 64   Temp 36.6 °C (97.8 °F) (Temporal)   Resp 16   Ht 1.626 m (5' 4\")   Wt 123 kg (272 lb)   LMP 03/30/2014   SpO2 97%   BMI 46.69 kg/m²  Body mass index is 46.69 kg/m².    Gen: Alert and oriented, No apparent distress.  Ext: No clubbing, cyanosis, edema.  Skin:    Patient has a red macular lesions primarily on the posterior aspect of the forearms.  No vesicles are seen.  A few have a little bit of scaling.  No induration.    Labs: Patient reminded to do her labs.    Assessment & Plan:     59 y.o. female with the following -     1. Dermatitis (Primary)  This is a new problem.  I told patient this might be a sun allergy.  Will try her on Kenalog cream to see if it helps to resolve.  If it does not she is to let me know and we will " refer her to dermatology.      Return if symptoms worsen or fail to improve.    Please note that this dictation was created using voice recognition software. I have made every reasonable attempt to correct obvious errors, but I expect that there are errors of grammar and possibly content that I did not discover before finalizing the note.             [1]   Past Medical History:  Diagnosis Date    Anemia     history of    Anesthesia     slow to wake up    Anxiety disorder     Arrhythmia     Its been years.  Said my heart is very.good.    Arthritis      knees    Cancer (HCC)     thyroid ca-thyroidectomy. Radiation tx. No chemo.    Chickenpox as child    Chronic nausea     Delayed emergence from general anesthesia     Dental disorder     upper/lower dentures    Edema 10/2021    to ankles bilateral. 21-continues, followed by PCP.    Elevated alkaline phosphatase level     Essential hypertension     Fatty liver     Fatty liver     GERD (gastroesophageal reflux disease)     Heart burn     taking omeprazole    Hypothyroidism     Indigestion     taking omeprazole    Pain 2017    left wrist, 6/10    Pain     right    Palpitations     Rheumatic fever as child    Seasonal allergies     Sleep apnea     CPAP with Oxygen 2L @ night    Snoring     no sleep study    Unspecified disorder of thyroid     thyroid cancer w/ radiation/surgery    Urinary bladder disorder     overactive bladder, takes medication   [2]   Social History  Tobacco Use    Smoking status: Former     Current packs/day: 0.00     Average packs/day: 0.5 packs/day for 5.0 years (2.5 ttl pk-yrs)     Types: Cigarettes     Start date: 1991     Quit date: 1996     Years since quittin.4    Smokeless tobacco: Never    Tobacco comments:        Vaping Use    Vaping status: Never Used   Substance Use Topics    Alcohol use: Yes     Comment: 2-3 times a year    Drug use: Never   [3]   Current Outpatient Medications Ordered in Epic    Medication Sig Dispense Refill    triamcinolone (KENALOG) 0.1 % lotion Apply 1 Application topically 3 times a day. 60 mL 1    lisinopril (PRINIVIL) 20 MG Tab Take 1 Tablet by mouth every day. 90 Tablet 2    Fiber 500 MG Cap Take  by mouth every day.      omeprazole (PRILOSEC) 40 MG delayed-release capsule Take 1 Capsule by mouth every morning. Indications: Gastroesophageal Reflux Disease 100 Capsule 3    sertraline (ZOLOFT) 100 MG Tab Take 1 Tablet by mouth every day. (Patient taking differently: Take 100 mg by mouth every morning. For a total 125mg) 100 Tablet 3    levothyroxine (SYNTHROID) 137 MCG Tab Take 1 tablet Monday through Saturday, then 2 tablets on Sunday (Patient taking differently: No sig reported) 100 Tablet 3    oxybutynin SR (DITROPAN-XL) 5 MG TABLET SR 24 HR Take 1 Tablet by mouth every day. (Patient taking differently: Take 5 mg by mouth every morning.) 30 Tablet 3    atenolol (TENORMIN) 50 MG Tab TAKE 1 TABLET BY MOUTH IN THE MORNING AND 1/2 AT NIGHT 135 Tablet 0    gabapentin (NEURONTIN) 300 MG Cap TAKE 1 CAPSULE BY MOUTH AT BEDTIME AS NEEDED FOR  NERVE  PAIN 30 Capsule 0    docusate sodium 100 MG Cap Take 100 mg by mouth 2 times a day. (Patient not taking: Reported on 6/11/2025) 20 Capsule 2     No current Epic-ordered facility-administered medications on file.

## 2025-06-11 NOTE — ASSESSMENT & PLAN NOTE
This is a new problem.  Patient states she has had a skin rash on her forearms for the last month.  It has been persistent.  It sometimes itches.  No vesicles.  No no changes in medications or supplements.  No changes in personal hygiene products.  She has never had this before.

## 2025-07-14 ASSESSMENT — ENCOUNTER SYMPTOMS: SLEEP DISTURBANCE: 0

## 2025-07-15 ENCOUNTER — OFFICE VISIT (OUTPATIENT)
Dept: SLEEP MEDICINE | Facility: MEDICAL CENTER | Age: 60
End: 2025-07-15
Attending: FAMILY MEDICINE
Payer: MEDICARE

## 2025-07-15 VITALS
OXYGEN SATURATION: 95 % | WEIGHT: 270 LBS | BODY MASS INDEX: 39.99 KG/M2 | HEART RATE: 56 BPM | RESPIRATION RATE: 16 BRPM | DIASTOLIC BLOOD PRESSURE: 64 MMHG | SYSTOLIC BLOOD PRESSURE: 112 MMHG | HEIGHT: 69 IN

## 2025-07-15 DIAGNOSIS — Z78.9 INTOLERANCE OF CONTINUOUS POSITIVE AIRWAY PRESSURE (CPAP) VENTILATION: ICD-10-CM

## 2025-07-15 DIAGNOSIS — G47.33 OSA (OBSTRUCTIVE SLEEP APNEA): Primary | ICD-10-CM

## 2025-07-15 PROCEDURE — 3074F SYST BP LT 130 MM HG: CPT | Performed by: STUDENT IN AN ORGANIZED HEALTH CARE EDUCATION/TRAINING PROGRAM

## 2025-07-15 PROCEDURE — 99204 OFFICE O/P NEW MOD 45 MIN: CPT | Performed by: STUDENT IN AN ORGANIZED HEALTH CARE EDUCATION/TRAINING PROGRAM

## 2025-07-15 PROCEDURE — 3078F DIAST BP <80 MM HG: CPT | Performed by: STUDENT IN AN ORGANIZED HEALTH CARE EDUCATION/TRAINING PROGRAM

## 2025-07-15 PROCEDURE — 99213 OFFICE O/P EST LOW 20 MIN: CPT | Performed by: FAMILY MEDICINE

## 2025-07-15 ASSESSMENT — FIBROSIS 4 INDEX: FIB4 SCORE: 1.35

## 2025-07-15 NOTE — PROGRESS NOTES
[facility] Consult Note     Date: 7/15/2025 / Time: 9:49 AM      Thank you for requesting a sleep medicine consultation on Luann Morales at the sleep center. Presents today with the chief complaints of snoring, fatigue, history of sleep apnea and difficulty tolerating CPAP. She is referred by Sukhwinder Ricardo III, M.D.  [address] [place] [address] for evaluation and treatment of obstructive sleep apnea.    HISTORY OF PRESENT ILLNESS:     Luann Morales is a 59 y.o. female with GERD, palpitations, hypertension, obesity, and history of severe obstructive sleep apnea.  Presents to Sleep Clinic for evaluation of sleep.    She was diagnosed with severe sleep apnea via HST in 2020.  Following the sleep study she was placed on a auto CPAP machine at home.  She had difficulty tolerating auto CPAP at home.  She states she can be claustrophobic at times.  In addition she had difficulty with her equipment company and getting an appropriate fitting mask.  This led to her discontinuing CPAP.  She has not used CPAP in years.    She does snore in her sleep.  She does have daytime tiredness and low energy.  She did wanted to discuss if she potentially is a candidate for inspire.    When she was using CPAP in the past she did not notice a difference in her symptoms.    As per supplemental questionnaire to be scanned or imported into chart:    Camilla Sleepiness Score: 8    Sleep Schedule  Bedtime: Weekday & Weekend 9-11pm  Wake time: Weekday & Weekend 530-7am  Sleep-onset latency: >30min 3-4 days   Awakenings from sleep: 1-3  Difficulty falling back asleep: not generally   Bedroom partner: No  Naps: rare    DAYTIME SYMPTOMS:   Excessive daytime sleepiness: No   Daytime fatigue: Yes  Difficulty concentrating: No   Memory problems: Yes  Irritability:No   Work/school performance issues: NA  Sleepiness with driving: No   Caffeine/stimulant use: Yes  Alcohol use: rare     SLEEP RELATED  "SYMPTOMS  Snoring: Yes  Witnessed apnea or gasping/choking: No   Dry mouth or mouth breathing: No   Night Sweating: Yes  Teeth grinding/biting: No   Morning headaches: sometimes   Refreshed Upon Awakening: yes      SLEEP RELATED BEHAVIORS:  Parasomnias (walking, talking, eating, violence): No   Leg kicking: No   Restless legs - \"urge to move\": Yes  Nightmares: No  Recurrent: No   Dream enactment: No      NARCOLEPSY:  Cataplexy: No   Sleep paralysis: No   Sleep attacks: No   Hypnagogic/hypnopompic hallucinations: No     MEDICAL HISTORY  Past Medical History[1]     SURGICAL HISTORY  Past Surgical History[2]     FAMILY HISTORY  Family History   Problem Relation Age of Onset    Hypertension Mother     Heart Attack Father     Heart Disease Father         Heart attack    Alzheimer's Disease Father     Suicide Attempts Brother     Drug abuse Brother     Diabetes Maternal Grandmother         Borderline    Colon Cancer Maternal Grandfather     Alcohol abuse Sister         Pp    Cancer Maternal Uncle     Cancer Maternal Aunt     Stroke Maternal Uncle     Sleep Apnea Neg Hx        SOCIAL HISTORY  Social History[3]     Occupation: NA    CURRENT MEDICATIONS  Current Medications[4]    REVIEW OF SYSTEMS  Constitutional: Denies fevers, Denies weight changes  Ears/Nose/Throat/Mouth: Denies nasal congestion or sore throat   Cardiovascular: Denies chest pain  Respiratory: Denies shortness of breath, Denies cough  Gastrointestinal/Hepatic: Denies nausea, vomiting  Sleep: see HPI    Physical Examination:  Vitals/ General Appearance:   Weight/BMI: Body mass index is 39.87 kg/m².  /64 (BP Location: Left arm, Patient Position: Sitting, BP Cuff Size: Large adult)   Pulse (!) 56   Resp 16   Ht 1.753 m (5' 9\")   Wt 122 kg (270 lb)   SpO2 95%   Vitals:    07/15/25 0952   BP: 112/64   BP Location: Left arm   Patient Position: Sitting   BP Cuff Size: Large adult   Pulse: (!) 56   Resp: 16   SpO2: 95%   Weight: 122 kg (270 lb) " "  Height: 1.753 m (5' 9\")       Pt. is alert and oriented to time, place and person. Cooperative and in no apparent distress.     Constitutional: Alert, no distress, well-groomed.  Skin: No rashes in visible areas.  Eye: Round. Conjunctiva clear, lids normal. No icterus.   ENT EXAM  Nasal alae/valves collapsible: No   Nasal septum deviation: Yes  Nasal turbinate hypertrophy: No   Hard palate narrow: No   Hard palate high: No   Soft palate/uvula (Mallampati score): 4  Tongue Scalloping: No   Retrognathia: No   Micrognathia: No   Cardiovascular:no murmus/gallops/rubs, normal S1 and S2 heart sounds, regular rate and rhythm  Pulmonary:Clear to auscultation, No wheezes, No crackles.  Neurologic:Awake, alert and oriented x 3, No involuntary motions.  Extremities: No clubbing, cyanosis, or edema       ASSESSMENT AND PLAN   Luann Morales is a 59 y.o. female with GERD, palpitations, hypertension, obesity, and history of severe obstructive sleep apnea.  Presents to Sleep Clinic for evaluation of sleep.    Luann Morales  has hx of Obstructive Sleep Apnea (ILYA). Luann Morales has symptoms of snoring, daytime fatigue, brain fog. These can interfere with activities of daily living.     Pt has risk factors for ILYA include obesity, age, and crowded oropharynx.     The pathophysiology of ILYA and the increased risk of cardiovascular morbidity from untreated ILYA is discussed in detail with the patient. She  also has HTN, GERD, which can be worsened by ILYA.      We have discussed diagnostic options including in-laboratory, attended polysomnography and home sleep testing. We have also discussed treatment options including airway pressurization, reconstructive otolaryngologic surgery, dental appliances and weight management.     Subsequently,treatment options will be discussed based on the diagnostic study.     Advised that at this time she would not be a candidate for hypoglossal nerve stimulator given that her BMI is 39.9 " at today's visit.  Discussed cutoff for Medicare is currently at 35.  Also advised that she would need a new sleep study given that her last sleep study is over 2 years old.  Reviewed potential for split-night study as this may allow for her to try a different mask and different settings.    Plan  -  She  will be scheduled for an overnight PSG to assess sleep related breathing disorder.  - Follow up 1-2 weeks after sleep study to discuss results and treatment options moving forward   -Advised to reach out via MyChart or by phone with any questions or concerns.       Please note portions of this record was created using voice recognition software. I have made every reasonable attempt to correct obvious errors, but I expect that there are errors of grammar and possibly content I did not discover before finalizing the note.           [1]   Past Medical History:  Diagnosis Date    Anemia     history of    Anesthesia     slow to wake up    Anxiety disorder     Arrhythmia     Its been years.  Said my heart is very.good.    Arthritis      knees    Cancer (HCC) 1999    thyroid ca-thyroidectomy. Radiation tx. No chemo.    Chickenpox as child    Chronic nausea     Daytime sleepiness     Delayed emergence from general anesthesia     Dental disorder     upper/lower dentures    Edema 10/2021    to ankles bilateral. 12/27/21-continues, followed by PCP.    Elevated alkaline phosphatase level     Essential hypertension     Fatty liver     Fatty liver     GERD (gastroesophageal reflux disease)     Heart burn     taking omeprazole    Hypothyroidism     Indigestion     taking omeprazole    Pain 07/28/2017    left wrist, 6/10    Pain     right    Palpitations     Rheumatic fever as child    Seasonal allergies     Sleep apnea     CPAP with Oxygen 2L @ night    Snoring     no sleep study    Unspecified disorder of thyroid 1999    thyroid cancer w/ radiation/surgery    Urinary bladder disorder     overactive bladder, takes medication    [2]   Past Surgical History:  Procedure Laterality Date    PB TOTAL KNEE ARTHROPLASTY Right 2/20/2025    Procedure: ROBOTIC RIGHT TOTAL KNEE ARTHROPLASTY;  Surgeon: Brigido Oneal M.D.;  Location: [facility];  Service: Ortho Robotic    PB KNEE SCOPE,DIAGNOSTIC Left 06/06/2022    Procedure: ARTHROSCOPY, KNEE;  Surgeon: Davion Reyes M.D.;  Location: [facility];  Service: Orthopedics    MENISCECTOMY, KNEE, MEDIAL Left 06/06/2022    Procedure: MENISCECTOMY, KNEE, MEDIAL/LATERAL - PARTIAL;  Surgeon: Davion Reyes M.D.;  Location: [facility];  Service: Orthopedics    MT COLONOSCOPY-FLEXIBLE  01/10/2022    Procedure: COLONOSCOPY;  Surgeon: Francisco Chaudhary M.D.;  Location: [facility];  Service: Gastroenterology    MT UPPER GI ENDOSCOPY,DIAGNOSIS  01/10/2022    Procedure: GASTROSCOPY;  Surgeon: Francisco Chaudhary M.D.;  Location: [facility];  Service: Gastroenterology    COLONOSCOPY  03/27/2018    with EGD    SHOULDER ARTHROSCOPY Left 2018    WRIST ARTHROSCOPY Left 08/02/2017    Procedure: WRIST ARTHROSCOPY TRIANGULAR FIBROCARTILAGE COMPLEX DEBRIDEMENT;  Surgeon: Davion Grey M.D.;  Location: [facility];  Service:     LIGAMENT REPAIR  08/02/2017    Procedure: LIGAMENT REPAIR ULNOTRIQUETRAL ;  Surgeon: Davion Grey M.D.;  Location: [facility];  Service:     BREAST RECONSTRUCTION  2016    reduction    HYSTEROSCOPY WITH VIDEO OPERATIVE  04/11/2014    Performed by Pepe Deleon M.D. at SURGERY SAME DAY [facility]    DILATION AND CURETTAGE  04/11/2014    Performed by Pepe Deleon M.D. at SURGERY SAME DAY [facility]    EGD W/ENDOSCOPIC ULTRASOUND  11/24/2009    Performed by YARELI ANDERS at [facility]    ANIBAL BY LAPAROSCOPY  2005    FUSION, SPINE, LUMBAR, PLIF  09/2001    THYROIDECTOMY TOTAL  2000    WRIST EXPLORATION Right 1998    right; excision bone    PRIMARY C SECTION  1987, 1995         TUBAL COAGULATION LAPAROSCOPIC BILATERAL Bilateral    [3]   Social History  Socioeconomic History    Marital status:    Tobacco Use    Smoking status: Former     Current packs/day: 0.00     Average packs/day: 0.5 packs/day for 5.0 years (2.5 ttl pk-yrs)     Types: Cigarettes     Start date: 1991     Quit date: 1996     Years since quittin.5    Smokeless tobacco: Never    Tobacco comments:        Vaping Use    Vaping status: Never Used   Substance and Sexual Activity    Alcohol use: Yes     Comment: [facility]    Drug use: Never     Social Drivers of Health     Food Insecurity: No Food Insecurity (2025)    Hunger Vital Sign     Worried About Running Out of Food in the Last Year: Never true     Ran Out of Food in the Last Year: Never true   Transportation Needs: No Transportation Needs (2025)    PRAPARE - Transportation     Lack of Transportation (Medical): No     Lack of Transportation (Non-Medical): No   Intimate Partner Violence: Not At Risk (2025)    Humiliation, Afraid, Rape, and Kick questionnaire     Fear of Current or Ex-Partner: No     Emotionally Abused: No     Physically Abused: No     Sexually Abused: No   Housing Stability: Low Risk  (2025)    Housing Stability Vital Sign     Unable to Pay for Housing in the Last Year: No     Number of Times Moved in the Last Year: 0     Homeless in the Last Year: No   [4]   Current Outpatient Medications   Medication Sig Dispense Refill    triamcinolone (KENALOG) 0.1 % lotion Apply 1 Application topically 3 times a day. 60 mL 1    gabapentin (NEURONTIN) 300 MG Cap TAKE 1 CAPSULE BY MOUTH AT BEDTIME AS NEEDED FOR  NERVE  PAIN 30 Capsule 0    lisinopril (PRINIVIL) 20 MG Tab Take 1 Tablet by mouth every day. 90 Tablet 2    Fiber 500 MG Cap Take  by mouth every day.      omeprazole (PRILOSEC) 40 MG delayed-release capsule Take 1 Capsule by mouth every morning. Indications: Gastroesophageal Reflux Disease 100 Capsule  3    sertraline (ZOLOFT) 100 MG Tab Take 1 Tablet by mouth every day. (Patient taking differently: Take 100 mg by mouth every morning. For a total 125mg) 100 Tablet 3    levothyroxine (SYNTHROID) 137 MCG Tab Take 1 tablet Monday through Saturday, then 2 tablets on Sunday (Patient taking differently: No sig reported) 100 Tablet 3    oxybutynin SR (DITROPAN-XL) 5 MG TABLET SR 24 HR Take 1 Tablet by mouth every day. (Patient taking differently: Take 5 mg by mouth every morning.) 30 Tablet 3    atenolol (TENORMIN) 50 MG Tab TAKE 1 TABLET BY MOUTH IN THE MORNING AND 1/2 AT NIGHT 135 Tablet 0    docusate sodium 100 MG Cap Take 100 mg by mouth 2 times a day. (Patient not taking: No sig reported) 20 Capsule 2     No current facility-administered medications for this visit.

## 2025-07-23 ENCOUNTER — PATIENT MESSAGE (OUTPATIENT)
Dept: MEDICAL GROUP | Facility: PHYSICIAN GROUP | Age: 60
End: 2025-07-23
Payer: MEDICARE

## 2025-07-23 RX ORDER — SERTRALINE HYDROCHLORIDE 25 MG/1
25 TABLET, FILM COATED ORAL EVERY MORNING
Qty: 100 TABLET | Refills: 2 | Status: SHIPPED | OUTPATIENT
Start: 2025-07-23

## 2025-08-06 ENCOUNTER — OFFICE VISIT (OUTPATIENT)
Dept: MEDICAL GROUP | Facility: PHYSICIAN GROUP | Age: 60
End: 2025-08-06
Payer: MEDICARE

## 2025-08-06 VITALS
HEIGHT: 64 IN | BODY MASS INDEX: 45.75 KG/M2 | DIASTOLIC BLOOD PRESSURE: 70 MMHG | OXYGEN SATURATION: 96 % | SYSTOLIC BLOOD PRESSURE: 122 MMHG | HEART RATE: 64 BPM | RESPIRATION RATE: 16 BRPM | WEIGHT: 268 LBS | TEMPERATURE: 97.8 F

## 2025-08-06 DIAGNOSIS — E89.0 POSTOPERATIVE HYPOTHYROIDISM: ICD-10-CM

## 2025-08-06 DIAGNOSIS — H69.92 EUSTACHIAN TUBE DYSFUNCTION, LEFT: ICD-10-CM

## 2025-08-06 DIAGNOSIS — M25.531 ACUTE WRIST PAIN, RIGHT: Primary | ICD-10-CM

## 2025-08-06 PROBLEM — L30.9 DERMATITIS: Status: RESOLVED | Noted: 2025-06-11 | Resolved: 2025-08-06

## 2025-08-06 PROBLEM — R60.0 LOCALIZED EDEMA: Status: RESOLVED | Noted: 2021-10-07 | Resolved: 2025-08-06

## 2025-08-06 PROBLEM — M75.100 SUPRASPINATUS SYNDROME: Status: RESOLVED | Noted: 2019-04-22 | Resolved: 2025-08-06

## 2025-08-06 PROBLEM — Z01.818 PREOP EXAMINATION: Status: RESOLVED | Noted: 2025-01-16 | Resolved: 2025-08-06

## 2025-08-06 PROCEDURE — 3074F SYST BP LT 130 MM HG: CPT | Performed by: FAMILY MEDICINE

## 2025-08-06 PROCEDURE — 99213 OFFICE O/P EST LOW 20 MIN: CPT | Performed by: FAMILY MEDICINE

## 2025-08-06 PROCEDURE — 3078F DIAST BP <80 MM HG: CPT | Performed by: FAMILY MEDICINE

## 2025-08-06 RX ORDER — METHYLPREDNISOLONE 4 MG/1
TABLET ORAL
Qty: 21 TABLET | Refills: 0 | Status: SHIPPED | OUTPATIENT
Start: 2025-08-06

## 2025-08-06 ASSESSMENT — FIBROSIS 4 INDEX: FIB4 SCORE: 1.35

## 2025-10-03 ENCOUNTER — APPOINTMENT (OUTPATIENT)
Dept: SLEEP MEDICINE | Facility: MEDICAL CENTER | Age: 60
End: 2025-10-03
Attending: STUDENT IN AN ORGANIZED HEALTH CARE EDUCATION/TRAINING PROGRAM
Payer: MEDICARE

## (undated) DEVICE — KIT ANESTHESIA W/CIRCUIT & 3/LT BAG W/FILTER (20EA/CA)

## (undated) DEVICE — LACTATED RINGERS INJ 1000 ML - (14EA/CA 60CA/PF)

## (undated) DEVICE — SUTURE 1 VICRYL PLUS CTX - 8 X 18 INCH (12/BX)

## (undated) DEVICE — SUCTION INSTRUMENT YANKAUER BULBOUS TIP W/O VENT (50EA/CA)

## (undated) DEVICE — SODIUM CHL. IRRIGATION 0.9% 3000ML (4EA/CA 65CA/PF)

## (undated) DEVICE — DRAPE U ORTHOPEDIC - (10/BX)

## (undated) DEVICE — SUTURE GENERAL

## (undated) DEVICE — PAD PREP 24 X 48 CUFFED - (100/CA)

## (undated) DEVICE — DRAPE IOBAN II INCISE 23X17 - (10EA/BX 4BX/CA)

## (undated) DEVICE — GLOVE, LITE (PAIR)

## (undated) DEVICE — KIT  I.V. START (100EA/CA)

## (undated) DEVICE — CATHETER IV SAFETY 22 GA X 1 (50EA/BX)

## (undated) DEVICE — HEAD HOLDER JUNIOR/ADULT

## (undated) DEVICE — SENSOR OXIMETER ADULT SPO2 RD SET (20EA/BX)

## (undated) DEVICE — SODIUM CHL IRRIGATION 0.9% 1000ML (12EA/CA)

## (undated) DEVICE — ADHESIVE MASTISOL - (48/BX)

## (undated) DEVICE — BITE BLOCK ADULT 60FR (100EA/CA)

## (undated) DEVICE — MASK WITH FACE SHIELD (25/BX 4BX/CA)

## (undated) DEVICE — GLOVE BIOGEL SZ 7 SURGICAL PF LTX - (50PR/BX 4BX/CA)

## (undated) DEVICE — DRESSING TRANSPARENT FILM TEGADERM 4 X 4.75" (50EA/BX)"

## (undated) DEVICE — TUBE E-T HI-LO CUFF 7.0MM (10EA/PK)

## (undated) DEVICE — KIT REPAIR MENISCAL OUTSIDE-IN

## (undated) DEVICE — SYRINGE SAFETY 5 ML 18 GA X 1-1/2 BLUNT LL (100/BX 4BX/CA)

## (undated) DEVICE — CANISTER SUCTION 3000ML MECHANICAL FILTER AUTO SHUTOFF MEDI-VAC NONSTERILE LF DISP  (40EA/CA)

## (undated) DEVICE — BLADE 90X18X1.27MM SAW SAGITTAL

## (undated) DEVICE — GLOVE BIOGEL INDICATOR SZ 6.5 SURGICAL PF LTX - (50PR/BX 4BX/CA)

## (undated) DEVICE — CLOSURE SKIN STRIP 1/2 X 4 IN - (STERI STRIP) (50/BX 4BX/CA)

## (undated) DEVICE — GOWN WARMING STANDARD FLEX - (30/CA)

## (undated) DEVICE — NEPTUNE 4 PORT MANIFOLD - (20/PK)

## (undated) DEVICE — SUTURE 4-0 ETHILON FS-2 18 (36PK/BX)"

## (undated) DEVICE — MASK ANESTHESIA ADULT  - (100/CA)

## (undated) DEVICE — MAT PATIENT POSITIONING PREVALON (10EA/CA)

## (undated) DEVICE — TUBE CONNECTING SUCTION - CLEAR PLASTIC STERILE 72 IN (50EA/CA)

## (undated) DEVICE — CANISTER SUCTION RIGID RED 1500CC (40EA/CA)

## (undated) DEVICE — PIN FIXATION BONE STERILE 3.2MM X 110MM (2EA/PK)

## (undated) DEVICE — DRAPE SURGICAL U 77X120 - (10/CA)

## (undated) DEVICE — WATER IRRIGATION STERILE 1000ML (12EA/CA)

## (undated) DEVICE — SYRINGE 30 ML LL (56/BX)

## (undated) DEVICE — TOWEL STOP TIMEOUT SAFETY FLAG (40EA/CA)

## (undated) DEVICE — LEGGINGS IN-VIEW CLEAR POLY - (20PR/CA)

## (undated) DEVICE — ELECTRODE DUAL RETURN W/ CORD - (50/PK)

## (undated) DEVICE — SLEEVE, VASO, THIGH, MED

## (undated) DEVICE — GLOVE BIOGEL INDICATOR SZ 8 SURGICAL PF LTX - (50/BX 4BX/CA)

## (undated) DEVICE — GOWN SURGEONS LARGE - (32/CA)

## (undated) DEVICE — PLASTER ROLL 4X5YD XTRA FAST - 2-4 MIN (12EA/BX 6BX/CA)"

## (undated) DEVICE — SYRINGE DISP. 60 CC LL - (30/BX, 12BX/CA)**WHEN THESE ARE GONE ORDER #500206**

## (undated) DEVICE — GLOVE BIOGEL SZ 7.5 SURGICAL PF LTX - (50PR/BX 4BX/CA)

## (undated) DEVICE — PROTECTOR ULNA NERVE - (36PR/CA)

## (undated) DEVICE — SENSOR SPO2 NEO LNCS ADHESIVE (20/BX) SEE USER NOTES

## (undated) DEVICE — BAG SPONGE COUNT 10.25 X 32 - BLUE (250/CA)

## (undated) DEVICE — SHAVER 2.5 SM JT AGGRESS MEN. (5EA/BX)

## (undated) DEVICE — GLOVE BIOGEL PI ORTHO SZ 7.5 PF LF (40PR/BX)

## (undated) DEVICE — MASK AIRWAY FLEXIBLE SINGLE-USE SIZE 4 ADULTS (10EA/BX)

## (undated) DEVICE — TUBE E-T HI-LO CUFF 7.5MM (10EA/PK)

## (undated) DEVICE — LENS/HOOD FOR SPACESUIT - (32/PK) PEEL AWAY FACE

## (undated) DEVICE — NEEDLE SPINAL NON-SAFETY 18 GA X 3 IN (25EA/BX)

## (undated) DEVICE — TUBE SUCTION YANKAUER  1/4 X 6FT (20EA/CA)"

## (undated) DEVICE — DRAPE LARGE 3 QUARTER - (20/CA)

## (undated) DEVICE — SPONGE GAUZE STER 4X4 8-PL - (2/PK 50PK/BX 12BX/CS)

## (undated) DEVICE — HUMID-VENT HEAT AND MOISTURE EXCHANGE- (50/BX)

## (undated) DEVICE — TUBE E-T HI-LO CUFF 8.5MM (10EA/PK)

## (undated) DEVICE — GLOVE BIOGEL ECLIPSE PF LATEX SIZE 8.0  (50PR/BX)

## (undated) DEVICE — CHLORAPREP 26 ML APPLICATOR - ORANGE TINT(25/CA)

## (undated) DEVICE — TUBE E-T HI-LO CUFF 8.0MM (10EA/PK)

## (undated) DEVICE — PACK LOWER EXTREMITY - (2/CA)

## (undated) DEVICE — MASK, LARYNGEAL AIRWAY #4

## (undated) DEVICE — PACK KNEE ARTHROSCOPY SM OR - (2EA/CA)

## (undated) DEVICE — PACK TOTAL KNEE (1/CA)

## (undated) DEVICE — TUBE E-T HI-LO CUFF 6.5MM (10EA/BX)

## (undated) DEVICE — PADDING CAST 4 IN STERILE - 4 X 4 YDS (24/CA)

## (undated) DEVICE — SUTURE 2-0 VICRYL PLUS CT-1 - 8 X 18 INCH(12/BX)

## (undated) DEVICE — WATER IRRIG. STER. 1500 ML - (9/CA)

## (undated) DEVICE — GLOVE BIOGEL SZ 8 SURGICAL PF LTX - (50PR/BX 4BX/CA)

## (undated) DEVICE — PACK ARTHROSCOPY - (2EA//CA)

## (undated) DEVICE — CUFF TOURNIQUET 44 X 4 ONE PORT DISP - STERILE (10/CA)

## (undated) DEVICE — SYRINGE SAFETY 3 ML 18 GA X 1 1/2 BLUNT LL (100/BX 8BX/CA)

## (undated) DEVICE — SPONGE GAUZESTER 4 X 4 4PLY - (128PK/CA)

## (undated) DEVICE — GLOVE BIOGEL SZ 6.5 SURGICAL PF LTX (50PR/BX 4BX/CA)

## (undated) DEVICE — PIN FIXATION BONE STERILE 3.2MM X 140MM (2EA/PK)

## (undated) DEVICE — NEEDLE SAFETY 18 GA X 1 1/2 IN (100EA/BX)

## (undated) DEVICE — FORCEP RADIAL JAW 4 STANDARD CAPACITY W/NEEDLE 240CM (40EA/BX)

## (undated) DEVICE — HANDPIECE 10FT INTPLS SCT PLS IRRIGATION HAND CONTROL SET (6/PK)

## (undated) DEVICE — DRAPE SURG STERI-DRAPE 7X11OD - (40EA/CA)

## (undated) DEVICE — STOCKINET BIAS 4 IN STERILE - (20/CA)

## (undated) DEVICE — LEAD SET 6 DISP. EKG NIHON KOHDEN (100EA/CA)

## (undated) DEVICE — GUIDE TRACHE TUBE INTUBATING STYLET 5.0-10.0MM 14FR (20EA/PK)

## (undated) DEVICE — ELECTRODE 850 FOAM ADHESIVE - HYDROGEL RADIOTRNSPRNT (50/PK)

## (undated) DEVICE — TUBING CLEARLINK DUO-VENT - C-FLO (48EA/CA)

## (undated) DEVICE — Device

## (undated) DEVICE — SUTURE 3-0 MONOCRYL PLUS PS-1 - 27 INCH (36/BX)

## (undated) DEVICE — SET LEADWIRE 5 LEAD BEDSIDE DISPOSABLE ECG (1SET OF 5/EA)

## (undated) DEVICE — SYRINGE SAFETY 10 ML 18 GA X 1 1/2 BLUNT LL (100/BX 4BX/CA)

## (undated) DEVICE — TUBING DAY USE W/CARTRIDGE (10EA/BX)

## (undated) DEVICE — KIT DRAPE RIO ONE PIECE WITH POCKETS(10EA MINIMUM ORDER)  (1EA)

## (undated) DEVICE — TUBING CASSETTE CROSSFLOW INTEGRATED (10EA/CA)

## (undated) DEVICE — KIT ROOM DECONTAMINATION

## (undated) DEVICE — DRAPE STRLE REG TOWEL 18X24 - (10/BX 4BX/CA)"

## (undated) DEVICE — CONNECTOR HUBLESS DRAINAGE - ONE WAY (20/BX)

## (undated) DEVICE — TIP INTPLS HFLO ML ORFC BTRY - (12/CS) FOR SURGILAV

## (undated) DEVICE — SET EXTENSION WITH 2 PORTS (48EA/CA) ***PART #2C8610 IS A SUBSTITUTE*****

## (undated) DEVICE — SUTURE 2-0 PDS II CT-2 - (36/BX)

## (undated) DEVICE — KIT CUSTOM PROCEDURE SINGLE FOR ENDO  (15/CA)

## (undated) DEVICE — SHAVER4.0 AGGRESSIVE + FORMLA (5EA/BX)

## (undated) DEVICE — SYSTEM NAVIGATION VIZADISC KNEE PROCEDURE TRACKING KIT (1EA)

## (undated) DEVICE — CATHETER IV SAFETY 20 GA X 1-1/4 (50/BX)

## (undated) DEVICE — SENSOR SPO2 ADULT LNCS ADTX (20/BX) ORDER ITEM #19593

## (undated) DEVICE — DETERGENT RENUZYME PLUS 10 OZ PACKET (50/BX)

## (undated) DEVICE — SUTURE 3-0 ETHILON FS-1 - (36/BX) 30 INCH

## (undated) DEVICE — PADDING CAST 6 IN STERILE - 6 X 4 YDS (24/CA)